# Patient Record
Sex: MALE | Race: WHITE | NOT HISPANIC OR LATINO | Employment: OTHER | URBAN - METROPOLITAN AREA
[De-identification: names, ages, dates, MRNs, and addresses within clinical notes are randomized per-mention and may not be internally consistent; named-entity substitution may affect disease eponyms.]

---

## 2024-05-20 ENCOUNTER — IN HOME VISIT (OUTPATIENT)
Dept: WOUND CARE | Facility: HOSPITAL | Age: 89
End: 2024-05-20
Payer: COMMERCIAL

## 2024-05-20 DIAGNOSIS — S81.811A SKIN TEAR OF RIGHT LOWER LEG WITHOUT COMPLICATION, INITIAL ENCOUNTER: ICD-10-CM

## 2024-05-20 DIAGNOSIS — S51.012A SKIN TEAR OF LEFT ELBOW WITHOUT COMPLICATION, INITIAL ENCOUNTER: ICD-10-CM

## 2024-05-20 DIAGNOSIS — S51.011A SKIN TEAR OF RIGHT ELBOW WITHOUT COMPLICATION, INITIAL ENCOUNTER: Primary | ICD-10-CM

## 2024-05-20 PROCEDURE — 99344 HOME/RES VST NEW MOD MDM 60: CPT | Performed by: NURSE PRACTITIONER

## 2024-05-24 PROBLEM — S51.012A SKIN TEAR OF LEFT ELBOW WITHOUT COMPLICATION: Status: ACTIVE | Noted: 2024-05-24

## 2024-05-24 PROBLEM — S51.011A SKIN TEAR OF RIGHT ELBOW WITHOUT COMPLICATION: Status: ACTIVE | Noted: 2024-05-24

## 2024-05-24 PROBLEM — S81.811A SKIN TEAR OF RIGHT LOWER LEG WITHOUT COMPLICATION: Status: ACTIVE | Noted: 2024-05-24

## 2024-05-24 NOTE — ASSESSMENT & PLAN NOTE
Right lower leg  Wound is full-thickness, with no obvious sign of infection  Local wound care with calcium alginate due to moderate drainage  Continue to offload  Follow-up in 2 weeks

## 2024-05-24 NOTE — ASSESSMENT & PLAN NOTE
Left elbow  Partial-thickness, with no obvious sign of infection  Local wound care with bordered foam  Continue to offload

## 2024-05-24 NOTE — ASSESSMENT & PLAN NOTE
Right elbow  Partial-thickness, with no obvious sign of infection  Local wound care with bordered foam  Continue to offload

## 2024-05-24 NOTE — PROGRESS NOTES
[facility] WOUND CARE MANAGEMENT   AND HYPERBARIC MEDICINE CENTER       Patient ID: Dmitriy Craven is a 89 y.o. male Date of Birth 3/12/1932     Location of Service: [facility]    Performed wound round with: Wound team     Chief Complaint : bilateral elbow and right lower leg    Wound Instructions:  Wound: Bilateral elbow  Discontinue previous wound order  Cleanse the wound bed with NSS   Apply non-sting skin prep to periwound area  Apply bordered foam to wound bed  Frequency : three times a week and prn for soiling    Wound: Right lower leg  Discontinue previous wound order  Cleanse the wound bed with normal saline solution  Apply Skin-Prep to periwound area  Apply calcium alginate to wound bed and cover with bordered foam  3 times a week and as needed for soiling    Offload all wounds  Turn and reposition frequently  Instruct / Assist with weight shifting in wheelchair  Increase protein intake.  Monitor for any sign of infection or worsening, inform PCP or patient's primary physician in your facility.      Allergies  Patient has no allergy information on record.      Assessment & Plan:  1. Skin tear of right elbow without complication, initial encounter  Assessment & Plan:  Right elbow  Partial-thickness, with no obvious sign of infection  Local wound care with bordered foam  Continue to offload  2. Skin tear of left elbow without complication, initial encounter  Assessment & Plan:  Left elbow  Partial-thickness, with no obvious sign of infection  Local wound care with bordered foam  Continue to offload  3. Skin tear of right lower leg without complication, initial encounter  Assessment & Plan:  Right lower leg  Wound is full-thickness, with no obvious sign of infection  Local wound care with calcium alginate due to moderate drainage  Continue to offload  Follow-up in 2 weeks           Subjective:   May 20, 2024.  New consult for wound on the bilateral elbow and right lower leg.  Etiology unknown.   Received patient in chair, seems comfortable.  Denies pain.  No significant issues reported related to the wound.        Review of Systems   Constitutional: Negative.    Respiratory: Negative.     Cardiovascular: Negative.    Skin:  Positive for wound.   Psychiatric/Behavioral: Negative.         Objective:    Physical Exam  Constitutional:       Appearance: Normal appearance.   Cardiovascular:      Comments: As per report, patient's blood pressure is low  Pulmonary:      Effort: Pulmonary effort is normal.   Musculoskeletal:      Right lower leg: Edema present.      Left lower leg: Edema present.      Comments: Mild edema on bilateral lower legs, wheelchair-bound   Skin:     Findings: Lesion present.      Comments: Right elbow: Wound size is 1.5 x 2 cm.,  Partial-thickness, small amount of serous drainage, periwound normal, with no obvious sign of infection    Left elbow: Wound size is 1 x 6 cm.,  Partial-thickness, small amount of serous drainage, periwound is normal, with no obvious sign of infection    Right lower leg posterior: Wound size is 2 x 1.5 x 0.1 cm.,  100% granulation, moderate amount of serous drainage, periwound is normal, with no obvious sign of infection   Neurological:      Mental Status: He is alert.              Procedures           Patient's care was coordinated with nursing facility staff. Recent vitals, labs and updated medications were reviewed on EMR or chart system of facility. Past Medical, surgical, social, medication and allergy history and patient's previous records were reviewed and updated as appropriate: Most up-to date information is available in the facility EMR where the patient is currently admitted.    Patient Active Problem List   Diagnosis    Skin tear of right elbow without complication    Skin tear of left elbow without complication    Skin tear of right lower leg without complication     No past medical history on file.  No past surgical history on file.  Social History  "    Socioeconomic History    Marital status:      Spouse name: Not on file    Number of children: Not on file    Years of education: Not on file    Highest education level: Not on file   Occupational History    Not on file   Tobacco Use    Smoking status: Not on file    Smokeless tobacco: Not on file   Substance and Sexual Activity    Alcohol use: Not on file    Drug use: Not on file    Sexual activity: Not on file   Other Topics Concern    Not on file   Social History Narrative    Not on file     Social Determinants of Health     Financial Resource Strain: Not on file   Food Insecurity: Not on file   Transportation Needs: Not on file   Physical Activity: Not on file   Stress: Not on file   Social Connections: Not on file   Intimate Partner Violence: Not on file   Housing Stability: Not on file      No current outpatient medications on file.  No family history on file.           Coordination of Care: Wound team aware of the treatment plan. Facility nurse will provide wound treatment and monitor the wound for any changes.     Patient / Staff education : Patient / Staff was given education on sign of infection and pressure ulcer prevention. Patient/ Staff verbalized understanding     Follow up :  2 weeks    Voice-recognition software may have been used in the preparation of this document. Occasional wrong word or \"sound-alike\" substitutions may have occurred due to the inherent limitations of voice recognition software. Interpretation should be guided by context.      HAILEY Thurman  "

## 2024-05-28 ENCOUNTER — TRANSCRIBE ORDERS (OUTPATIENT)
Dept: LAB | Facility: CLINIC | Age: 89
End: 2024-05-28

## 2024-05-28 ENCOUNTER — APPOINTMENT (OUTPATIENT)
Dept: LAB | Facility: CLINIC | Age: 89
End: 2024-05-28
Payer: COMMERCIAL

## 2024-05-28 DIAGNOSIS — I48.91 ATRIAL FIBRILLATION, UNSPECIFIED TYPE (HCC): Primary | ICD-10-CM

## 2024-05-28 DIAGNOSIS — I48.91 ATRIAL FIBRILLATION, UNSPECIFIED TYPE (HCC): ICD-10-CM

## 2024-05-28 LAB
INR PPP: 4.32 (ref 0.84–1.19)
PROTHROMBIN TIME: 40.4 SECONDS (ref 11.6–14.5)

## 2024-05-28 PROCEDURE — 36415 COLL VENOUS BLD VENIPUNCTURE: CPT

## 2024-05-28 PROCEDURE — 85610 PROTHROMBIN TIME: CPT

## 2024-06-03 ENCOUNTER — IN HOME VISIT (OUTPATIENT)
Dept: WOUND CARE | Facility: HOSPITAL | Age: 89
End: 2024-06-03
Payer: COMMERCIAL

## 2024-06-03 DIAGNOSIS — S81.811A SKIN TEAR OF RIGHT LOWER LEG WITHOUT COMPLICATION, INITIAL ENCOUNTER: ICD-10-CM

## 2024-06-03 DIAGNOSIS — S51.011A SKIN TEAR OF RIGHT ELBOW WITHOUT COMPLICATION, INITIAL ENCOUNTER: Primary | ICD-10-CM

## 2024-06-03 DIAGNOSIS — S51.012A SKIN TEAR OF LEFT ELBOW WITHOUT COMPLICATION, INITIAL ENCOUNTER: ICD-10-CM

## 2024-06-03 PROCEDURE — 99348 HOME/RES VST EST LOW MDM 30: CPT | Performed by: NURSE PRACTITIONER

## 2024-06-03 PROCEDURE — 97597 DBRDMT OPN WND 1ST 20 CM/<: CPT | Performed by: NURSE PRACTITIONER

## 2024-06-03 NOTE — ASSESSMENT & PLAN NOTE
Right lower leg  Wound is covered with slough, debridement performed, 100% granulation postdebridement  Continue local wound care with calcium alginate  Continue to offload  Patient under the care of visiting nurse for wound management  Follow-up in 2 weeks

## 2024-06-03 NOTE — PROGRESS NOTES
[facility] WOUND CARE MANAGEMENT   AND HYPERBARIC MEDICINE CENTER       Patient ID: Dmitriy Craven is a 89 y.o. male Date of Birth 3/12/1932     Location of Service: [facility], [place], operated by [facility]    Performed wound round with: Wound team     Chief Complaint : bilateral elbow and right lower leg    Wound Instructions:  Wound: Left elbow  Discontinue previous wound order  Cleanse the wound bed with NSS   Apply non-sting skin prep to periwound area  Apply bordered foam to wound bed  Frequency : three times a week and prn for soiling    Discontinue wound treatment on the right elbow    Wound: Right lower leg  Discontinue previous wound order  Cleanse the wound bed with normal saline solution  Apply Skin-Prep to periwound area  Apply calcium alginate to wound bed and cover with bordered foam  3 times a week and as needed for soiling    Offload all wounds  Turn and reposition frequently  Instruct / Assist with weight shifting in wheelchair  Increase protein intake.  Monitor for any sign of infection or worsening, inform PCP or patient's primary physician in your facility.      Allergies  Patient has no allergy information on record.      Assessment & Plan:  1. Skin tear of right elbow without complication, initial encounter  Assessment & Plan:  Right elbow healed  2. Skin tear of left elbow without complication, initial encounter  Assessment & Plan:  Wound improved, decreasing wound size  Continue bordered foam   follow-up in 2 weeks  3. Skin tear of right lower leg without complication, initial encounter  Assessment & Plan:  Right lower leg  Wound is covered with slough, debridement performed, 100% granulation postdebridement  Continue local wound care with calcium alginate  Continue to offload  Patient under the care of visiting nurse for wound management  Follow-up in 2 weeks  Orders:  -     Debridement             Subjective:   May 20, 2024.  New consult for wound on the bilateral elbow and right lower leg.  Etiology unknown.   "Received patient in chair, seems comfortable.  Denies pain.  No significant issues reported related to the wound.    6/3/2024 Follow up for wound on the bilateral elbow and right lower leg . Received patient, not in distress. Facility staff did not report any significant issues related to the wound.   Left elbow          Review of Systems   Constitutional: Negative.    Respiratory: Negative.     Cardiovascular: Negative.    Skin:  Positive for wound.   Psychiatric/Behavioral: Negative.         Objective:    Physical Exam  Constitutional:       Appearance: Normal appearance.   Cardiovascular:      Comments: As per report, patient's blood pressure is low  Pulmonary:      Effort: Pulmonary effort is normal.   Musculoskeletal:      Right lower leg: Edema present.      Left lower leg: Edema present.      Comments: Mild edema on bilateral lower legs, wheelchair-bound   Skin:     Findings: Lesion present.      Comments: Right elbow: wound is healed    Left elbow: Wound size is 0.5 x 1.2.,  Partial-thickness, small amount of serous drainage, periwound is normal, with no obvious sign of infection    Right lower leg posterior: Wound size is 1 x .9 x 0.4 cm.,  100% granulation, moderate amount of serous drainage, periwound is normal, with no obvious sign of infection   Neurological:      Mental Status: He is alert.              Debridement    Universal Protocol:  Consent: Verbal consent obtained.  Risks and benefits: risks, benefits and alternatives were discussed  Consent given by: patient  Time out: Immediately prior to procedure a \"time out\" was called to verify the correct patient, procedure, equipment, support staff and site/side marked as required.  Timeout called at: 6/3/2024 8:34 AM.  Patient understanding: patient states understanding of the procedure being performed  Patient identity confirmed: verbally with patient    Debridement Details  Performed by: NP (right Lower leg)  Debridement type: " selective      Post-debridement measurements  Length (cm): 1  Width (cm): 0.9  Depth (cm): 0.4  Percent debrided: 100%  Surface Area (cm^2): 0.9  Area Debrided (cm^2): 0.9  Volume (cm^3): 0.36    Devitalized tissue debrided: fibrin and slough  Instrument(s) utilized: curette  Technique utilized: nonexcisionalBleeding: small  Hemostasis obtained with: pressure  Procedural pain (0-10): 0  Post-procedural pain: 0   Response to treatment: procedure was tolerated well               Patient's care was coordinated with nursing facility staff. Recent vitals, labs and updated medications were reviewed on EMR or chart system of facility. Past Medical, surgical, social, medication and allergy history and patient's previous records were reviewed and updated as appropriate: Most up-to date information is available in the facility EMR where the patient is currently admitted.    Patient Active Problem List   Diagnosis    Skin tear of right elbow without complication    Skin tear of left elbow without complication    Skin tear of right lower leg without complication     History reviewed. No pertinent past medical history.  History reviewed. No pertinent surgical history.  Social History     Socioeconomic History    Marital status:      Spouse name: None    Number of children: None    Years of education: None    Highest education level: None   Occupational History    None   Tobacco Use    Smoking status: None    Smokeless tobacco: None   Substance and Sexual Activity    Alcohol use: None    Drug use: None    Sexual activity: None   Other Topics Concern    None   Social History Narrative    None     Social Determinants of Health     Financial Resource Strain: Not on file   Food Insecurity: Not on file   Transportation Needs: Not on file   Physical Activity: Not on file   Stress: Not on file   Social Connections: Not on file   Intimate Partner Violence: Not on file   Housing Stability: Not on file      No current outpatient  "medications on file.  History reviewed. No pertinent family history.           Coordination of Care: Wound team aware of the treatment plan. Facility nurse will provide wound treatment and monitor the wound for any changes.     Patient / Staff education : Patient / Staff was given education on sign of infection and pressure ulcer prevention. Patient/ Staff verbalized understanding     Follow up :  2 weeks    Voice-recognition software may have been used in the preparation of this document. Occasional wrong word or \"sound-alike\" substitutions may have occurred due to the inherent limitations of voice recognition software. Interpretation should be guided by context.      HAILEY Thurman  "

## 2024-06-05 ENCOUNTER — APPOINTMENT (OUTPATIENT)
Dept: LAB | Facility: CLINIC | Age: 89
End: 2024-06-05
Payer: COMMERCIAL

## 2024-06-05 DIAGNOSIS — I48.91 ATRIAL FIBRILLATION, UNSPECIFIED TYPE (HCC): ICD-10-CM

## 2024-06-05 LAB
INR PPP: 3.1 (ref 0.84–1.19)
PROTHROMBIN TIME: 31.3 SECONDS (ref 11.6–14.5)

## 2024-06-05 PROCEDURE — 85610 PROTHROMBIN TIME: CPT

## 2024-06-05 PROCEDURE — 36415 COLL VENOUS BLD VENIPUNCTURE: CPT

## 2024-06-10 ENCOUNTER — IN HOME VISIT (OUTPATIENT)
Dept: WOUND CARE | Facility: HOSPITAL | Age: 89
End: 2024-06-10
Payer: COMMERCIAL

## 2024-06-10 DIAGNOSIS — S81.811A SKIN TEAR OF RIGHT LOWER LEG WITHOUT COMPLICATION, INITIAL ENCOUNTER: ICD-10-CM

## 2024-06-10 DIAGNOSIS — S51.012A SKIN TEAR OF LEFT ELBOW WITHOUT COMPLICATION, INITIAL ENCOUNTER: ICD-10-CM

## 2024-06-10 DIAGNOSIS — S51.011A SKIN TEAR OF RIGHT ELBOW WITHOUT COMPLICATION, INITIAL ENCOUNTER: Primary | ICD-10-CM

## 2024-06-10 PROCEDURE — 97597 DBRDMT OPN WND 1ST 20 CM/<: CPT | Performed by: NURSE PRACTITIONER

## 2024-06-10 NOTE — ASSESSMENT & PLAN NOTE
Right lower leg  Wound improved, increase granulation, 100% granulation postdebridement  Continue local wound care with calcium alginate  Continue to offload  Patient under the care of visiting nurse for wound management  Follow-up in 2 weeks

## 2024-06-10 NOTE — PROGRESS NOTES
[facility] WOUND CARE MANAGEMENT   AND HYPERBARIC MEDICINE CENTER       Patient ID: Dmitriy Craven is a 89 y.o. male Date of Birth 3/12/1932     Location of Service: [facility]    Performed wound round with: Wound team     Chief Complaint : bilateral elbow and right lower leg    Wound Instructions:  Wound: Left elbow  Discontinue previous wound order  Discontinue wound treatment on the right elbow    Wound: Right lower leg  Discontinue previous wound order  Cleanse the wound bed with normal saline solution  Apply Skin-Prep to periwound area  Apply calcium alginate to wound bed and cover with bordered foam  daily and as needed for soiling    Offload all wounds  Turn and reposition frequently  Instruct / Assist with weight shifting in wheelchair  Increase protein intake.  Monitor for any sign of infection or worsening, inform PCP or patient's primary physician in your facility.      Allergies  Patient has no allergy information on record.      Assessment & Plan:  1. Skin tear of right elbow without complication, initial encounter  Assessment & Plan:  Wound is healed  2. Skin tear of left elbow without complication, initial encounter  Assessment & Plan:  Left elbow  Wound is healed  3. Skin tear of right lower leg without complication, initial encounter  Assessment & Plan:  Right lower leg  Wound improved, increase granulation, 100% granulation postdebridement  Continue local wound care with calcium alginate  Continue to offload  Patient under the care of visiting nurse for wound management  Follow-up in 2 weeks               Subjective:   May 20, 2024.  New consult for wound on the bilateral elbow and right lower leg.  Etiology unknown.  Received patient in chair, seems comfortable.  Denies pain.  No significant issues reported related to the wound.    6/3/2024 Follow up for wound on the bilateral elbow and right lower leg . Received patient, not in distress. Facility staff did not report any significant  "issues related to the wound.     6/10/2024 Follow up for wound on the bilateral elbow and right lower leg posterior . Received patient, not in distress. Facility staff did not report any significant issues related to the wound. Denies pain.            Review of Systems   Constitutional: Negative.    Respiratory: Negative.     Cardiovascular: Negative.    Skin:  Positive for wound.   Psychiatric/Behavioral: Negative.         Objective:    Physical Exam  Constitutional:       Appearance: Normal appearance.   Cardiovascular:      Comments: As per report, patient's blood pressure is low  Pulmonary:      Effort: Pulmonary effort is normal.   Musculoskeletal:      Right lower leg: Edema present.      Left lower leg: Edema present.      Comments: Mild edema on bilateral lower legs, wheelchair-bound   Skin:     Findings: Lesion present.      Comments: Right elbow: wound is healed    Left elbow: wound is healed    Right lower leg posterior: Wound size is 1 .x 1 x 0.3 cm.,  80% granulation, 20% slough,large amount of serous drainage, periwound is normal, with no obvious sign of infection   Neurological:      Mental Status: He is alert.              Debridement    Universal Protocol:  Consent: Verbal consent obtained.  Risks and benefits: risks, benefits and alternatives were discussed  Consent given by: patient  Time out: Immediately prior to procedure a \"time out\" was called to verify the correct patient, procedure, equipment, support staff and site/side marked as required.  Timeout called at: 6/10/2024 8:00 AM.  Patient understanding: patient states understanding of the procedure being performed  Patient identity confirmed: verbally with patient    Debridement Details  Performed by: NP (right lower leg)  Debridement type: selective      Post-debridement measurements  Length (cm): 1.2  Width (cm): 1  Depth (cm): 0.3  Percent debrided: 100%  Surface Area (cm^2): 1.2  Area Debrided (cm^2): 1.2  Volume (cm^3): " 0.36    Devitalized tissue debrided: biofilm, fibrin and slough  Instrument(s) utilized: curette  Technique utilized: nonexcisionalBleeding: small  Hemostasis obtained with: pressure  Procedural pain (0-10): 0  Post-procedural pain: 0   Response to treatment: procedure was tolerated well               Patient's care was coordinated with nursing facility staff. Recent vitals, labs and updated medications were reviewed on EMR or chart system of facility. Past Medical, surgical, social, medication and allergy history and patient's previous records were reviewed and updated as appropriate: Most up-to date information is available in the facility EMR where the patient is currently admitted.    Patient Active Problem List   Diagnosis    Skin tear of right elbow without complication    Skin tear of left elbow without complication    Skin tear of right lower leg without complication     History reviewed. No pertinent past medical history.  History reviewed. No pertinent surgical history.  Social History     Socioeconomic History    Marital status:      Spouse name: None    Number of children: None    Years of education: None    Highest education level: None   Occupational History    None   Tobacco Use    Smoking status: None    Smokeless tobacco: None   Substance and Sexual Activity    Alcohol use: None    Drug use: None    Sexual activity: None   Other Topics Concern    None   Social History Narrative    None     Social Determinants of Health     Financial Resource Strain: Not on file   Food Insecurity: Not on file   Transportation Needs: Not on file   Physical Activity: Not on file   Stress: Not on file   Social Connections: Not on file   Intimate Partner Violence: Not on file   Housing Stability: Not on file      No current outpatient medications on file.  History reviewed. No pertinent family history.           Coordination of Care: Wound team aware of the treatment plan. Facility nurse will provide wound  "treatment and monitor the wound for any changes.     Patient / Staff education : Patient / Staff was given education on sign of infection and pressure ulcer prevention. Patient/ Staff verbalized understanding     Follow up :  2 weeks    Voice-recognition software may have been used in the preparation of this document. Occasional wrong word or \"sound-alike\" substitutions may have occurred due to the inherent limitations of voice recognition software. Interpretation should be guided by context.      HAILEY Thurman  "

## 2024-06-12 ENCOUNTER — TRANSCRIBE ORDERS (OUTPATIENT)
Dept: LAB | Facility: CLINIC | Age: 89
End: 2024-06-12

## 2024-06-12 ENCOUNTER — APPOINTMENT (OUTPATIENT)
Dept: LAB | Facility: CLINIC | Age: 89
End: 2024-06-12
Payer: COMMERCIAL

## 2024-06-12 DIAGNOSIS — N18.31 CHRONIC KIDNEY DISEASE (CKD) STAGE G3A/A1, MODERATELY DECREASED GLOMERULAR FILTRATION RATE (GFR) BETWEEN 45-59 ML/MIN/1.73 SQUARE METER AND ALBUMINURIA CREATININE RATIO LESS THAN 30 MG/G (HCC): ICD-10-CM

## 2024-06-12 DIAGNOSIS — Z95.2 HEART VALVE REPLACED BY TRANSPLANT: ICD-10-CM

## 2024-06-12 DIAGNOSIS — N18.31 CHRONIC KIDNEY DISEASE (CKD) STAGE G3A/A1, MODERATELY DECREASED GLOMERULAR FILTRATION RATE (GFR) BETWEEN 45-59 ML/MIN/1.73 SQUARE METER AND ALBUMINURIA CREATININE RATIO LESS THAN 30 MG/G (HCC): Primary | ICD-10-CM

## 2024-06-12 DIAGNOSIS — I48.91 ATRIAL FIBRILLATION, UNSPECIFIED TYPE (HCC): ICD-10-CM

## 2024-06-12 LAB
INR PPP: 2.51 (ref 0.84–1.19)
PROTHROMBIN TIME: 26.6 SECONDS (ref 11.6–14.5)

## 2024-06-12 PROCEDURE — 36415 COLL VENOUS BLD VENIPUNCTURE: CPT

## 2024-06-12 PROCEDURE — 85610 PROTHROMBIN TIME: CPT

## 2024-06-19 ENCOUNTER — APPOINTMENT (OUTPATIENT)
Dept: LAB | Facility: CLINIC | Age: 89
End: 2024-06-19
Payer: COMMERCIAL

## 2024-06-19 DIAGNOSIS — N18.31 CHRONIC KIDNEY DISEASE (CKD) STAGE G3A/A1, MODERATELY DECREASED GLOMERULAR FILTRATION RATE (GFR) BETWEEN 45-59 ML/MIN/1.73 SQUARE METER AND ALBUMINURIA CREATININE RATIO LESS THAN 30 MG/G (HCC): ICD-10-CM

## 2024-06-19 DIAGNOSIS — I48.91 ATRIAL FIBRILLATION, UNSPECIFIED TYPE (HCC): ICD-10-CM

## 2024-06-19 DIAGNOSIS — Z95.2 HEART VALVE REPLACED BY TRANSPLANT: ICD-10-CM

## 2024-06-19 LAB
INR PPP: 1.92 (ref 0.84–1.19)
PROTHROMBIN TIME: 21.6 SECONDS (ref 11.6–14.5)

## 2024-06-19 PROCEDURE — 85610 PROTHROMBIN TIME: CPT

## 2024-06-19 PROCEDURE — 36415 COLL VENOUS BLD VENIPUNCTURE: CPT

## 2024-07-01 ENCOUNTER — IN HOME VISIT (OUTPATIENT)
Dept: WOUND CARE | Facility: HOSPITAL | Age: 89
End: 2024-07-01
Payer: COMMERCIAL

## 2024-07-01 DIAGNOSIS — S81.811A SKIN TEAR OF RIGHT LOWER LEG WITHOUT COMPLICATION, INITIAL ENCOUNTER: Primary | ICD-10-CM

## 2024-07-01 PROCEDURE — 99347 HOME/RES VST EST SF MDM 20: CPT | Performed by: NURSE PRACTITIONER

## 2024-07-01 NOTE — ASSESSMENT & PLAN NOTE
Right lower leg  Wound is healed  Moisturizing lotion to BLE  Continue compression  Education provided to patient on compression and elevation  Sign off. Facility staff will continue to provide treatment and monitor the wound. Can reconsult wound nurse practitioner if wound failed to heal or worsened.

## 2024-07-01 NOTE — PROGRESS NOTES
[facility] WOUND CARE MANAGEMENT   AND HYPERBARIC MEDICINE CENTER       Patient ID: Dmitriy Craven is a 89 y.o. male Date of Birth 3/12/1932     Location of Service: [facility]    Performed wound round with: Wound team     Chief Complaint : right lower leg    Wound Instructions:  Wound: Right lower leg  Discontinue previous wound order  Moisturizing lotion to BLE daily  Compression : Tubigrip on BLE from the base of the toes to proximal calf, on in am, off at night  Elevate BLE atleast 4 times a day for 20 minutes    Offload all wounds  Turn and reposition frequently  Instruct / Assist with weight shifting in wheelchair  Increase protein intake.  Monitor for any sign of infection or worsening, inform PCP or patient's primary physician in your facility.      Allergies  Patient has no allergy information on record.      Assessment & Plan:  1. Skin tear of right lower leg without complication, initial encounter  Assessment & Plan:  Right lower leg  Wound is healed  Moisturizing lotion to BLE  Continue compression  Education provided to patient on compression and elevation  Sign off. Facility staff will continue to provide treatment and monitor the wound. Can reconsult wound nurse practitioner if wound failed to heal or worsened.                    Subjective:   May 20, 2024.  New consult for wound on the bilateral elbow and right lower leg.  Etiology unknown.  Received patient in chair, seems comfortable.  Denies pain.  No significant issues reported related to the wound.    6/3/2024 Follow up for wound on the bilateral elbow and right lower leg . Received patient, not in distress. Facility staff did not report any significant issues related to the wound.     6/10/2024 Follow up for wound on the bilateral elbow and right lower leg posterior . Received patient, not in distress. Facility staff did not report any significant issues related to the wound. Denies pain.    7/1/2024 Follow up for wound on the right  lower leg . Received patient, not in distress. Facility staff did not report any significant issues related to the wound. Denies pain.               Review of Systems   Constitutional: Negative.    Respiratory: Negative.     Cardiovascular: Negative.    Skin:  Positive for wound.   Psychiatric/Behavioral: Negative.         Objective:    Physical Exam  Constitutional:       Appearance: Normal appearance.   Cardiovascular:      Comments: As per report, patient's blood pressure is low  Pulmonary:      Effort: Pulmonary effort is normal.   Musculoskeletal:      Right lower leg: Edema present.      Left lower leg: Edema present.      Comments: Mild edema on bilateral lower legs, wheelchair-bound   Skin:     Findings: Lesion present.      Comments: Right lower leg posterior: Wound is healed   Neurological:      Mental Status: He is alert.              Procedures           Patient's care was coordinated with nursing facility staff. Recent vitals, labs and updated medications were reviewed on EMR or chart system of facility. Past Medical, surgical, social, medication and allergy history and patient's previous records were reviewed and updated as appropriate: Most up-to date information is available in the facility EMR where the patient is currently admitted.    Patient Active Problem List   Diagnosis    Skin tear of right elbow without complication    Skin tear of left elbow without complication    Skin tear of right lower leg without complication     History reviewed. No pertinent past medical history.  History reviewed. No pertinent surgical history.  Social History     Socioeconomic History    Marital status:      Spouse name: None    Number of children: None    Years of education: None    Highest education level: None   Occupational History    None   Tobacco Use    Smoking status: None    Smokeless tobacco: None   Substance and Sexual Activity    Alcohol use: None    Drug use: None    Sexual activity: None  "  Other Topics Concern    None   Social History Narrative    None     Social Determinants of Health     Financial Resource Strain: Not on file   Food Insecurity: Not on file   Transportation Needs: Not on file   Physical Activity: Not on file   Stress: Not on file   Social Connections: Not on file   Intimate Partner Violence: Not on file   Housing Stability: Not on file      No current outpatient medications on file.  History reviewed. No pertinent family history.           Coordination of Care: Wound team aware of the treatment plan. Facility nurse will provide wound treatment and monitor the wound for any changes.     Patient / Staff education : Patient / Staff was given education on sign of infection and pressure ulcer prevention. Patient/ Staff verbalized understanding     Follow up :  Sign off. Facility staff will continue to provide treatment and monitor the wound. Can reconsult wound nurse practitioner if wound failed to heal or worsened.       Voice-recognition software may have been used in the preparation of this document. Occasional wrong word or \"sound-alike\" substitutions may have occurred due to the inherent limitations of voice recognition software. Interpretation should be guided by context.      HAILEY Thurman  "

## 2024-07-03 ENCOUNTER — TRANSCRIBE ORDERS (OUTPATIENT)
Dept: LAB | Facility: CLINIC | Age: 89
End: 2024-07-03

## 2024-07-03 ENCOUNTER — APPOINTMENT (OUTPATIENT)
Dept: LAB | Facility: CLINIC | Age: 89
End: 2024-07-03
Payer: COMMERCIAL

## 2024-07-03 DIAGNOSIS — N18.31 CHRONIC KIDNEY DISEASE (CKD) STAGE G3A/A1, MODERATELY DECREASED GLOMERULAR FILTRATION RATE (GFR) BETWEEN 45-59 ML/MIN/1.73 SQUARE METER AND ALBUMINURIA CREATININE RATIO LESS THAN 30 MG/G (HCC): ICD-10-CM

## 2024-07-03 DIAGNOSIS — I48.91 ATRIAL FIBRILLATION, UNSPECIFIED TYPE (HCC): Primary | ICD-10-CM

## 2024-07-03 DIAGNOSIS — Z95.2 HEART VALVE REPLACED BY TRANSPLANT: ICD-10-CM

## 2024-07-03 DIAGNOSIS — I48.91 ATRIAL FIBRILLATION, UNSPECIFIED TYPE (HCC): ICD-10-CM

## 2024-07-03 LAB
INR PPP: 2 (ref 0.84–1.19)
PROTHROMBIN TIME: 22.4 SECONDS (ref 11.6–14.5)

## 2024-07-03 PROCEDURE — 85610 PROTHROMBIN TIME: CPT

## 2024-07-03 PROCEDURE — 36415 COLL VENOUS BLD VENIPUNCTURE: CPT

## 2024-07-19 ENCOUNTER — TRANSCRIBE ORDERS (OUTPATIENT)
Dept: LAB | Facility: CLINIC | Age: 89
End: 2024-07-19

## 2024-07-19 ENCOUNTER — APPOINTMENT (OUTPATIENT)
Dept: LAB | Facility: CLINIC | Age: 89
End: 2024-07-19
Payer: COMMERCIAL

## 2024-07-19 DIAGNOSIS — I48.91 ATRIAL FIBRILLATION, UNSPECIFIED TYPE (HCC): Primary | ICD-10-CM

## 2024-07-19 DIAGNOSIS — I48.91 ATRIAL FIBRILLATION, UNSPECIFIED TYPE (HCC): ICD-10-CM

## 2024-07-19 LAB
INR PPP: 1.91 (ref 0.84–1.19)
PROTHROMBIN TIME: 21.6 SECONDS (ref 11.6–14.5)

## 2024-07-19 PROCEDURE — 36415 COLL VENOUS BLD VENIPUNCTURE: CPT

## 2024-07-19 PROCEDURE — 85610 PROTHROMBIN TIME: CPT

## 2024-08-05 ENCOUNTER — TRANSCRIBE ORDERS (OUTPATIENT)
Dept: ADMINISTRATIVE | Facility: HOSPITAL | Age: 89
End: 2024-08-05

## 2024-08-05 ENCOUNTER — APPOINTMENT (OUTPATIENT)
Dept: LAB | Facility: CLINIC | Age: 89
End: 2024-08-05
Payer: COMMERCIAL

## 2024-08-05 DIAGNOSIS — I48.91 ATRIAL FIBRILLATION, UNSPECIFIED TYPE (HCC): Primary | ICD-10-CM

## 2024-08-05 DIAGNOSIS — I48.91 ATRIAL FIBRILLATION, UNSPECIFIED TYPE (HCC): ICD-10-CM

## 2024-08-05 LAB
INR PPP: 1.95 (ref 0.85–1.19)
PROTHROMBIN TIME: 22.3 SECONDS (ref 12.3–15)

## 2024-08-05 PROCEDURE — 36415 COLL VENOUS BLD VENIPUNCTURE: CPT

## 2024-08-05 PROCEDURE — 85610 PROTHROMBIN TIME: CPT

## 2024-08-19 ENCOUNTER — APPOINTMENT (OUTPATIENT)
Dept: LAB | Facility: CLINIC | Age: 89
End: 2024-08-19
Payer: COMMERCIAL

## 2024-08-19 ENCOUNTER — TRANSCRIBE ORDERS (OUTPATIENT)
Dept: LAB | Facility: CLINIC | Age: 89
End: 2024-08-19

## 2024-08-19 DIAGNOSIS — I48.91 ATRIAL FIBRILLATION, UNSPECIFIED TYPE (HCC): ICD-10-CM

## 2024-08-19 DIAGNOSIS — I48.91 ATRIAL FIBRILLATION, UNSPECIFIED TYPE (HCC): Primary | ICD-10-CM

## 2024-08-19 LAB
INR PPP: 1.75 (ref 0.85–1.19)
PROTHROMBIN TIME: 20.6 SECONDS (ref 12.3–15)

## 2024-08-19 PROCEDURE — 85610 PROTHROMBIN TIME: CPT

## 2024-08-19 PROCEDURE — 36415 COLL VENOUS BLD VENIPUNCTURE: CPT

## 2024-08-26 ENCOUNTER — APPOINTMENT (OUTPATIENT)
Dept: LAB | Facility: CLINIC | Age: 89
End: 2024-08-26
Payer: COMMERCIAL

## 2024-08-26 ENCOUNTER — TRANSCRIBE ORDERS (OUTPATIENT)
Dept: LAB | Facility: CLINIC | Age: 89
End: 2024-08-26

## 2024-08-26 DIAGNOSIS — I48.91 ATRIAL FIBRILLATION, UNSPECIFIED TYPE (HCC): ICD-10-CM

## 2024-08-26 DIAGNOSIS — I48.91 ATRIAL FIBRILLATION, UNSPECIFIED TYPE (HCC): Primary | ICD-10-CM

## 2024-08-26 LAB
INR PPP: 1.84 (ref 0.85–1.19)
PROTHROMBIN TIME: 21.3 SECONDS (ref 12.3–15)

## 2024-08-26 PROCEDURE — 85610 PROTHROMBIN TIME: CPT

## 2024-08-26 PROCEDURE — 36415 COLL VENOUS BLD VENIPUNCTURE: CPT

## 2024-09-11 ENCOUNTER — APPOINTMENT (OUTPATIENT)
Dept: LAB | Facility: CLINIC | Age: 89
End: 2024-09-11
Payer: COMMERCIAL

## 2024-09-11 DIAGNOSIS — I48.91 ATRIAL FIBRILLATION, UNSPECIFIED TYPE (HCC): ICD-10-CM

## 2024-09-11 LAB
INR PPP: 2.15 (ref 0.85–1.19)
PROTHROMBIN TIME: 24 SECONDS (ref 12.3–15)

## 2024-09-11 PROCEDURE — 36415 COLL VENOUS BLD VENIPUNCTURE: CPT

## 2024-09-11 PROCEDURE — 85610 PROTHROMBIN TIME: CPT

## 2024-09-25 ENCOUNTER — TRANSCRIBE ORDERS (OUTPATIENT)
Dept: LAB | Facility: CLINIC | Age: 89
End: 2024-09-25

## 2024-09-25 ENCOUNTER — APPOINTMENT (OUTPATIENT)
Dept: LAB | Facility: CLINIC | Age: 89
End: 2024-09-25
Payer: COMMERCIAL

## 2024-09-25 DIAGNOSIS — I48.91 ATRIAL FIBRILLATION, UNSPECIFIED TYPE (HCC): Primary | ICD-10-CM

## 2024-09-25 DIAGNOSIS — I48.91 ATRIAL FIBRILLATION, UNSPECIFIED TYPE (HCC): ICD-10-CM

## 2024-09-25 LAB
INR PPP: 2.12 (ref 0.85–1.19)
PROTHROMBIN TIME: 23.7 SECONDS (ref 12.3–15)

## 2024-09-25 PROCEDURE — 85610 PROTHROMBIN TIME: CPT

## 2024-09-25 PROCEDURE — 36415 COLL VENOUS BLD VENIPUNCTURE: CPT

## 2024-10-09 ENCOUNTER — TRANSCRIBE ORDERS (OUTPATIENT)
Dept: LAB | Facility: CLINIC | Age: 89
End: 2024-10-09

## 2024-10-09 ENCOUNTER — APPOINTMENT (OUTPATIENT)
Dept: LAB | Facility: CLINIC | Age: 89
End: 2024-10-09
Payer: COMMERCIAL

## 2024-10-09 DIAGNOSIS — I48.91 ATRIAL FIBRILLATION, UNSPECIFIED TYPE (HCC): Primary | ICD-10-CM

## 2024-10-09 DIAGNOSIS — I48.91 ATRIAL FIBRILLATION, UNSPECIFIED TYPE (HCC): ICD-10-CM

## 2024-10-09 LAB
INR PPP: 2.78 (ref 0.85–1.19)
PROTHROMBIN TIME: 29.1 SECONDS (ref 12.3–15)

## 2024-10-09 PROCEDURE — 85610 PROTHROMBIN TIME: CPT

## 2024-10-09 PROCEDURE — 36415 COLL VENOUS BLD VENIPUNCTURE: CPT

## 2024-11-06 ENCOUNTER — TRANSCRIBE ORDERS (OUTPATIENT)
Dept: LAB | Facility: CLINIC | Age: 89
End: 2024-11-06

## 2024-11-06 ENCOUNTER — APPOINTMENT (OUTPATIENT)
Dept: LAB | Facility: CLINIC | Age: 89
End: 2024-11-06
Payer: COMMERCIAL

## 2024-11-06 DIAGNOSIS — I48.91 ATRIAL FIBRILLATION, UNSPECIFIED TYPE (HCC): Primary | ICD-10-CM

## 2024-11-06 DIAGNOSIS — I48.91 ATRIAL FIBRILLATION, UNSPECIFIED TYPE (HCC): ICD-10-CM

## 2024-11-06 LAB
INR PPP: 2.22 (ref 0.85–1.19)
PROTHROMBIN TIME: 24.6 SECONDS (ref 12.3–15)

## 2024-11-06 PROCEDURE — 36415 COLL VENOUS BLD VENIPUNCTURE: CPT

## 2024-11-06 PROCEDURE — 85610 PROTHROMBIN TIME: CPT

## 2024-11-20 ENCOUNTER — TRANSCRIBE ORDERS (OUTPATIENT)
Dept: LAB | Facility: CLINIC | Age: 89
End: 2024-11-20

## 2024-11-20 ENCOUNTER — APPOINTMENT (OUTPATIENT)
Dept: LAB | Facility: CLINIC | Age: 89
End: 2024-11-20
Payer: COMMERCIAL

## 2024-11-20 DIAGNOSIS — I48.91 ATRIAL FIBRILLATION, UNSPECIFIED TYPE (HCC): Primary | ICD-10-CM

## 2024-11-20 DIAGNOSIS — I48.91 ATRIAL FIBRILLATION, UNSPECIFIED TYPE (HCC): ICD-10-CM

## 2024-11-20 LAB
INR PPP: 2.83 (ref 0.85–1.19)
PROTHROMBIN TIME: 29.5 SECONDS (ref 12.3–15)

## 2024-11-20 PROCEDURE — 85610 PROTHROMBIN TIME: CPT

## 2024-11-20 PROCEDURE — 36415 COLL VENOUS BLD VENIPUNCTURE: CPT

## 2024-12-04 ENCOUNTER — TRANSCRIBE ORDERS (OUTPATIENT)
Dept: LAB | Facility: CLINIC | Age: 89
End: 2024-12-04

## 2024-12-04 ENCOUNTER — APPOINTMENT (OUTPATIENT)
Dept: LAB | Facility: CLINIC | Age: 89
End: 2024-12-04
Payer: COMMERCIAL

## 2024-12-04 DIAGNOSIS — I48.91 ATRIAL FIBRILLATION, UNSPECIFIED TYPE (HCC): ICD-10-CM

## 2024-12-04 DIAGNOSIS — I48.91 ATRIAL FIBRILLATION, UNSPECIFIED TYPE (HCC): Primary | ICD-10-CM

## 2024-12-04 LAB
INR PPP: 4.04 (ref 0.85–1.19)
PROTHROMBIN TIME: 38.6 SECONDS (ref 12.3–15)

## 2024-12-04 PROCEDURE — 85610 PROTHROMBIN TIME: CPT

## 2024-12-04 PROCEDURE — 36415 COLL VENOUS BLD VENIPUNCTURE: CPT

## 2024-12-12 ENCOUNTER — APPOINTMENT (OUTPATIENT)
Dept: LAB | Facility: CLINIC | Age: 89
End: 2024-12-12
Payer: COMMERCIAL

## 2024-12-12 ENCOUNTER — TRANSCRIBE ORDERS (OUTPATIENT)
Dept: LAB | Facility: CLINIC | Age: 89
End: 2024-12-12

## 2024-12-12 DIAGNOSIS — I48.91 ATRIAL FIBRILLATION, UNSPECIFIED TYPE (HCC): ICD-10-CM

## 2024-12-12 DIAGNOSIS — I48.91 ATRIAL FIBRILLATION, UNSPECIFIED TYPE (HCC): Primary | ICD-10-CM

## 2024-12-12 LAB
INR PPP: 3.55 (ref 0.85–1.19)
PROTHROMBIN TIME: 35 SECONDS (ref 12.3–15)

## 2024-12-12 PROCEDURE — 85610 PROTHROMBIN TIME: CPT

## 2024-12-12 PROCEDURE — 36415 COLL VENOUS BLD VENIPUNCTURE: CPT

## 2024-12-20 ENCOUNTER — TRANSCRIBE ORDERS (OUTPATIENT)
Dept: LAB | Facility: CLINIC | Age: 89
End: 2024-12-20

## 2024-12-20 ENCOUNTER — APPOINTMENT (OUTPATIENT)
Dept: LAB | Facility: CLINIC | Age: 89
End: 2024-12-20
Payer: COMMERCIAL

## 2024-12-20 DIAGNOSIS — I48.91 ATRIAL FIBRILLATION, UNSPECIFIED TYPE (HCC): Primary | ICD-10-CM

## 2024-12-20 DIAGNOSIS — I48.91 ATRIAL FIBRILLATION, UNSPECIFIED TYPE (HCC): ICD-10-CM

## 2024-12-20 LAB
INR PPP: 3.16 (ref 0.85–1.19)
PROTHROMBIN TIME: 32 SECONDS (ref 12.3–15)

## 2024-12-20 PROCEDURE — 85610 PROTHROMBIN TIME: CPT

## 2024-12-20 PROCEDURE — 36415 COLL VENOUS BLD VENIPUNCTURE: CPT

## 2024-12-27 ENCOUNTER — APPOINTMENT (OUTPATIENT)
Dept: LAB | Facility: CLINIC | Age: 89
End: 2024-12-27
Payer: COMMERCIAL

## 2024-12-27 ENCOUNTER — TRANSCRIBE ORDERS (OUTPATIENT)
Dept: ADMINISTRATIVE | Facility: HOSPITAL | Age: 89
End: 2024-12-27

## 2024-12-27 DIAGNOSIS — I48.91 ATRIAL FIBRILLATION, UNSPECIFIED TYPE (HCC): ICD-10-CM

## 2024-12-27 DIAGNOSIS — I10 ESSENTIAL HYPERTENSION, MALIGNANT: ICD-10-CM

## 2024-12-27 DIAGNOSIS — Z13.220 SCREENING FOR LIPOID DISORDERS: ICD-10-CM

## 2024-12-27 DIAGNOSIS — I48.91 ATRIAL FIBRILLATION, UNSPECIFIED TYPE (HCC): Primary | ICD-10-CM

## 2024-12-27 LAB
INR PPP: 2.43 (ref 0.85–1.19)
PROTHROMBIN TIME: 27.1 SECONDS (ref 12.3–15)

## 2024-12-27 PROCEDURE — 36415 COLL VENOUS BLD VENIPUNCTURE: CPT

## 2024-12-27 PROCEDURE — 85610 PROTHROMBIN TIME: CPT

## 2025-01-08 ENCOUNTER — APPOINTMENT (OUTPATIENT)
Dept: LAB | Facility: CLINIC | Age: OVER 89
End: 2025-01-08
Payer: COMMERCIAL

## 2025-01-08 ENCOUNTER — TRANSCRIBE ORDERS (OUTPATIENT)
Dept: LAB | Facility: CLINIC | Age: OVER 89
End: 2025-01-08

## 2025-01-08 DIAGNOSIS — I48.91 ATRIAL FIBRILLATION, UNSPECIFIED TYPE (HCC): Primary | ICD-10-CM

## 2025-01-08 DIAGNOSIS — I10 ESSENTIAL HYPERTENSION, MALIGNANT: ICD-10-CM

## 2025-01-08 DIAGNOSIS — Z13.220 SCREENING FOR LIPOID DISORDERS: ICD-10-CM

## 2025-01-08 DIAGNOSIS — I48.91 ATRIAL FIBRILLATION, UNSPECIFIED TYPE (HCC): ICD-10-CM

## 2025-01-08 LAB
ALBUMIN SERPL BCG-MCNC: 3.5 G/DL (ref 3.5–5)
ALP SERPL-CCNC: 84 U/L (ref 34–104)
ALT SERPL W P-5'-P-CCNC: 36 U/L (ref 7–52)
ANION GAP SERPL CALCULATED.3IONS-SCNC: 10 MMOL/L (ref 4–13)
AST SERPL W P-5'-P-CCNC: 32 U/L (ref 13–39)
BASOPHILS # BLD AUTO: 0.05 THOUSANDS/ΜL (ref 0–0.1)
BASOPHILS NFR BLD AUTO: 1 % (ref 0–1)
BILIRUB SERPL-MCNC: 0.6 MG/DL (ref 0.2–1)
BUN SERPL-MCNC: 75 MG/DL (ref 5–25)
CALCIUM SERPL-MCNC: 9.1 MG/DL (ref 8.4–10.2)
CHLORIDE SERPL-SCNC: 103 MMOL/L (ref 96–108)
CHOLEST SERPL-MCNC: 118 MG/DL (ref ?–200)
CO2 SERPL-SCNC: 28 MMOL/L (ref 21–32)
CREAT SERPL-MCNC: 2.17 MG/DL (ref 0.6–1.3)
EOSINOPHIL # BLD AUTO: 0.53 THOUSAND/ΜL (ref 0–0.61)
EOSINOPHIL NFR BLD AUTO: 8 % (ref 0–6)
ERYTHROCYTE [DISTWIDTH] IN BLOOD BY AUTOMATED COUNT: 17.2 % (ref 11.6–15.1)
GFR SERPL CREATININE-BSD FRML MDRD: 25 ML/MIN/1.73SQ M
GLUCOSE P FAST SERPL-MCNC: 55 MG/DL (ref 65–99)
HCT VFR BLD AUTO: 22.4 % (ref 36.5–49.3)
HDLC SERPL-MCNC: 48 MG/DL
HGB BLD-MCNC: 7 G/DL (ref 12–17)
IMM GRANULOCYTES # BLD AUTO: 0.02 THOUSAND/UL (ref 0–0.2)
IMM GRANULOCYTES NFR BLD AUTO: 0 % (ref 0–2)
INR PPP: 2.32 (ref 0.85–1.19)
LDLC SERPL CALC-MCNC: 55 MG/DL (ref 0–100)
LYMPHOCYTES # BLD AUTO: 0.96 THOUSANDS/ΜL (ref 0.6–4.47)
LYMPHOCYTES NFR BLD AUTO: 15 % (ref 14–44)
MCH RBC QN AUTO: 35 PG (ref 26.8–34.3)
MCHC RBC AUTO-ENTMCNC: 31.3 G/DL (ref 31.4–37.4)
MCV RBC AUTO: 112 FL (ref 82–98)
MONOCYTES # BLD AUTO: 1.37 THOUSAND/ΜL (ref 0.17–1.22)
MONOCYTES NFR BLD AUTO: 22 % (ref 4–12)
NEUTROPHILS # BLD AUTO: 3.36 THOUSANDS/ΜL (ref 1.85–7.62)
NEUTS SEG NFR BLD AUTO: 54 % (ref 43–75)
NONHDLC SERPL-MCNC: 70 MG/DL
NRBC BLD AUTO-RTO: 0 /100 WBCS
PLATELET # BLD AUTO: 205 THOUSANDS/UL (ref 149–390)
PMV BLD AUTO: 11.5 FL (ref 8.9–12.7)
POTASSIUM SERPL-SCNC: 4.1 MMOL/L (ref 3.5–5.3)
PROT SERPL-MCNC: 6.5 G/DL (ref 6.4–8.4)
PROTHROMBIN TIME: 25.4 SECONDS (ref 12.3–15)
RBC # BLD AUTO: 2 MILLION/UL (ref 3.88–5.62)
SODIUM SERPL-SCNC: 141 MMOL/L (ref 135–147)
TRIGL SERPL-MCNC: 74 MG/DL (ref ?–150)
TSH SERPL DL<=0.05 MIU/L-ACNC: 3.18 UIU/ML (ref 0.45–4.5)
WBC # BLD AUTO: 6.29 THOUSAND/UL (ref 4.31–10.16)

## 2025-01-08 PROCEDURE — 80061 LIPID PANEL: CPT

## 2025-01-08 PROCEDURE — 80053 COMPREHEN METABOLIC PANEL: CPT

## 2025-01-08 PROCEDURE — 83036 HEMOGLOBIN GLYCOSYLATED A1C: CPT

## 2025-01-08 PROCEDURE — 36415 COLL VENOUS BLD VENIPUNCTURE: CPT

## 2025-01-08 PROCEDURE — 85025 COMPLETE CBC W/AUTO DIFF WBC: CPT

## 2025-01-08 PROCEDURE — 85610 PROTHROMBIN TIME: CPT

## 2025-01-08 PROCEDURE — 84443 ASSAY THYROID STIM HORMONE: CPT

## 2025-01-09 LAB
EST. AVERAGE GLUCOSE BLD GHB EST-MCNC: 134 MG/DL
HBA1C MFR BLD: 6.3 %

## 2025-01-22 ENCOUNTER — APPOINTMENT (OUTPATIENT)
Dept: LAB | Facility: CLINIC | Age: OVER 89
End: 2025-01-22
Payer: COMMERCIAL

## 2025-01-22 DIAGNOSIS — I48.91 ATRIAL FIBRILLATION, UNSPECIFIED TYPE (HCC): ICD-10-CM

## 2025-01-22 LAB
INR PPP: 1.89 (ref 0.85–1.19)
PROTHROMBIN TIME: 21.8 SECONDS (ref 12.3–15)

## 2025-01-22 PROCEDURE — 36415 COLL VENOUS BLD VENIPUNCTURE: CPT

## 2025-01-22 PROCEDURE — 85610 PROTHROMBIN TIME: CPT

## 2025-02-04 ENCOUNTER — TRANSCRIBE ORDERS (OUTPATIENT)
Dept: LAB | Facility: CLINIC | Age: OVER 89
End: 2025-02-04

## 2025-02-04 ENCOUNTER — APPOINTMENT (OUTPATIENT)
Dept: LAB | Facility: CLINIC | Age: OVER 89
End: 2025-02-04
Payer: COMMERCIAL

## 2025-02-04 DIAGNOSIS — I48.91 ATRIAL FIBRILLATION, UNSPECIFIED TYPE (HCC): ICD-10-CM

## 2025-02-04 DIAGNOSIS — I48.91 ATRIAL FIBRILLATION, UNSPECIFIED TYPE (HCC): Primary | ICD-10-CM

## 2025-02-04 LAB
INR PPP: 2.15 (ref 0.85–1.19)
PROTHROMBIN TIME: 24 SECONDS (ref 12.3–15)

## 2025-02-04 PROCEDURE — 85610 PROTHROMBIN TIME: CPT

## 2025-02-04 PROCEDURE — 36415 COLL VENOUS BLD VENIPUNCTURE: CPT

## 2025-02-19 ENCOUNTER — APPOINTMENT (OUTPATIENT)
Dept: LAB | Facility: CLINIC | Age: OVER 89
End: 2025-02-19
Payer: COMMERCIAL

## 2025-02-19 ENCOUNTER — TRANSCRIBE ORDERS (OUTPATIENT)
Dept: LAB | Facility: CLINIC | Age: OVER 89
End: 2025-02-19

## 2025-02-19 DIAGNOSIS — I48.91 ATRIAL FIBRILLATION, UNSPECIFIED TYPE (HCC): ICD-10-CM

## 2025-02-19 DIAGNOSIS — I48.91 ATRIAL FIBRILLATION, UNSPECIFIED TYPE (HCC): Primary | ICD-10-CM

## 2025-02-19 LAB
INR PPP: 2.79 (ref 0.85–1.19)
PROTHROMBIN TIME: 29.2 SECONDS (ref 12.3–15)

## 2025-02-19 PROCEDURE — 36415 COLL VENOUS BLD VENIPUNCTURE: CPT

## 2025-02-19 PROCEDURE — 85610 PROTHROMBIN TIME: CPT

## 2025-03-19 ENCOUNTER — APPOINTMENT (OUTPATIENT)
Dept: LAB | Facility: CLINIC | Age: OVER 89
End: 2025-03-19
Payer: COMMERCIAL

## 2025-03-19 DIAGNOSIS — I48.91 ATRIAL FIBRILLATION, UNSPECIFIED TYPE (HCC): ICD-10-CM

## 2025-03-19 LAB
INR PPP: 2.21 (ref 0.85–1.19)
PROTHROMBIN TIME: 24.5 SECONDS (ref 12.3–15)

## 2025-03-19 PROCEDURE — 36415 COLL VENOUS BLD VENIPUNCTURE: CPT

## 2025-03-19 PROCEDURE — 85610 PROTHROMBIN TIME: CPT

## 2025-04-16 ENCOUNTER — APPOINTMENT (OUTPATIENT)
Dept: LAB | Facility: CLINIC | Age: OVER 89
End: 2025-04-16
Payer: COMMERCIAL

## 2025-04-16 ENCOUNTER — TRANSCRIBE ORDERS (OUTPATIENT)
Dept: LAB | Facility: CLINIC | Age: OVER 89
End: 2025-04-16

## 2025-04-16 DIAGNOSIS — I48.91 ATRIAL FIBRILLATION, UNSPECIFIED TYPE (HCC): ICD-10-CM

## 2025-04-16 DIAGNOSIS — I48.91 ATRIAL FIBRILLATION, UNSPECIFIED TYPE (HCC): Primary | ICD-10-CM

## 2025-04-16 LAB
INR PPP: 2.38 (ref 0.85–1.19)
PROTHROMBIN TIME: 25.9 SECONDS (ref 12.3–15)

## 2025-04-16 PROCEDURE — 85610 PROTHROMBIN TIME: CPT

## 2025-04-16 PROCEDURE — 36415 COLL VENOUS BLD VENIPUNCTURE: CPT

## 2025-05-14 ENCOUNTER — TRANSCRIBE ORDERS (OUTPATIENT)
Dept: LAB | Facility: CLINIC | Age: OVER 89
End: 2025-05-14

## 2025-05-14 ENCOUNTER — APPOINTMENT (OUTPATIENT)
Dept: LAB | Facility: CLINIC | Age: OVER 89
End: 2025-05-14
Payer: COMMERCIAL

## 2025-05-14 DIAGNOSIS — I48.91 ATRIAL FIBRILLATION, UNSPECIFIED TYPE (HCC): ICD-10-CM

## 2025-05-14 DIAGNOSIS — I48.91 ATRIAL FIBRILLATION, UNSPECIFIED TYPE (HCC): Primary | ICD-10-CM

## 2025-05-14 LAB
INR PPP: 4.12 (ref 0.85–1.19)
PROTHROMBIN TIME: 39.1 SECONDS (ref 12.3–15)

## 2025-05-14 PROCEDURE — 36415 COLL VENOUS BLD VENIPUNCTURE: CPT

## 2025-05-14 PROCEDURE — 85610 PROTHROMBIN TIME: CPT

## 2025-05-24 ENCOUNTER — TELEPHONE (OUTPATIENT)
Dept: OTHER | Facility: OTHER | Age: OVER 89
End: 2025-05-24

## 2025-05-24 NOTE — TELEPHONE ENCOUNTER
Nursing home or Independent living name: [facility]  If its not nursing home or Independent living, do they see PCP in Network:  Who is calling: Zeina  Callback number: [phone]  Symptoms: New admission/review orders  Did you page oncall or place in triage hub: esc Dr. Jasper Mondragon

## 2025-05-24 NOTE — TELEPHONE ENCOUNTER
Zeina from [facility] called reporting new admission and needing to verify orders    On call notified via epic chat

## 2025-05-27 ENCOUNTER — NURSING HOME VISIT (OUTPATIENT)
Dept: PULMONOLOGY | Facility: CLINIC | Age: OVER 89
End: 2025-05-27
Payer: COMMERCIAL

## 2025-05-27 ENCOUNTER — NURSING HOME VISIT (OUTPATIENT)
Dept: GERIATRICS | Facility: OTHER | Age: OVER 89
End: 2025-05-27
Payer: COMMERCIAL

## 2025-05-27 DIAGNOSIS — Z99.3 WHEELCHAIR DEPENDENT: ICD-10-CM

## 2025-05-27 DIAGNOSIS — I50.9 CONGESTIVE HEART FAILURE, UNSPECIFIED HF CHRONICITY, UNSPECIFIED HEART FAILURE TYPE (HCC): ICD-10-CM

## 2025-05-27 DIAGNOSIS — E11.22 TYPE 2 DIABETES MELLITUS WITH STAGE 3B CHRONIC KIDNEY DISEASE, WITHOUT LONG-TERM CURRENT USE OF INSULIN (HCC): ICD-10-CM

## 2025-05-27 DIAGNOSIS — Z74.09 IMPAIRED FUNCTIONAL MOBILITY, BALANCE, GAIT, AND ENDURANCE: ICD-10-CM

## 2025-05-27 DIAGNOSIS — J18.9 PNEUMONIA DUE TO INFECTIOUS ORGANISM, UNSPECIFIED LATERALITY, UNSPECIFIED PART OF LUNG: Primary | ICD-10-CM

## 2025-05-27 DIAGNOSIS — I27.20 PULMONARY HYPERTENSION (HCC): Primary | ICD-10-CM

## 2025-05-27 DIAGNOSIS — N18.32 TYPE 2 DIABETES MELLITUS WITH STAGE 3B CHRONIC KIDNEY DISEASE, WITHOUT LONG-TERM CURRENT USE OF INSULIN (HCC): ICD-10-CM

## 2025-05-27 DIAGNOSIS — R53.1 GENERALIZED WEAKNESS: ICD-10-CM

## 2025-05-27 DIAGNOSIS — N18.32 STAGE 3B CHRONIC KIDNEY DISEASE (HCC): ICD-10-CM

## 2025-05-27 DIAGNOSIS — I48.91 ATRIAL FIBRILLATION, UNSPECIFIED TYPE (HCC): ICD-10-CM

## 2025-05-27 DIAGNOSIS — J18.9 PNEUMONIA OF LEFT LOWER LOBE DUE TO INFECTIOUS ORGANISM: ICD-10-CM

## 2025-05-27 DIAGNOSIS — R07.81 RIB PAIN ON LEFT SIDE: ICD-10-CM

## 2025-05-27 DIAGNOSIS — J90 PLEURAL EFFUSION, LEFT: ICD-10-CM

## 2025-05-27 DIAGNOSIS — M48.061 SPINAL STENOSIS OF LUMBAR REGION WITHOUT NEUROGENIC CLAUDICATION: ICD-10-CM

## 2025-05-27 PROCEDURE — 99306 1ST NF CARE HIGH MDM 50: CPT | Performed by: FAMILY MEDICINE

## 2025-05-27 PROCEDURE — 99305 1ST NF CARE MODERATE MDM 35: CPT | Performed by: INTERNAL MEDICINE

## 2025-05-27 NOTE — PROGRESS NOTES
[facility]  [address]  [place] [address]   [facility]  History and Physical    NAME: Dmitriy Carven  AGE: 89 y.o. SEX: male [id number]    DATE OF ENCOUNTER: 5/27/2025    Code status:  CPR    Assessment and Plan     1. Pneumonia due to infectious organism, unspecified laterality, unspecified part of lung (Primary)    Clinically improved.   On cefpodoxime.     Monitor clinically.         2. Stage 3b chronic kidney disease (HCC)    Known CKD 3 b, likley component of MESHA as GFR is at 26.   Monitor labs.     3. Atrial fibrillation, unspecified type ([facility])  On rate control with metoprolol BID.     Is on coumadin , keep inr 2-3 , for stroke prevention.     Last INR on 5/24/2025 was 1.9.   Restart at 2.5 mg daily.     On admission came with supratherpaeutic INR at 9.2.       4. Spinal stenosis of lumbar region without neurogenic claudication    With chronic pain and had worsening low back pain.     Reports improvement of pain with current regimen.     On gabapentin 100 mg tid. May titrate as needed.   On robaxin   On oxycodone as needed for pain.           5. Congestive heart failure, unspecified HF chronicity, unspecified heart failure type (HCC)    Remains on bumex.   Monitor.   Currenlty euvolemic.     6. Type 2 diabetes mellitus with stage 3b chronic kidney disease, without long-term current use of insulin ([facility])    A1c on 5/19/2025 at 6.2.    Avoid intense blood sugar control.     Keep A1c < 7.5, if not < 8.     Currently on januvia and repaglinidne. Add holding parameters for repaglinide.   Blood sugars have been in the 200s, likely secondary to pneumonia.     Monitor and highly consider deprescribing of oral anti dm medications.   Due to ckd 3/4 would start with dc of januvia.     7. Impaired functional mobility, balance, gait, and endurance    Admit to [facility] for comprehensive rehab program.     He lives at [facility] as a long term resident.         8. Wheelchair  dependent      9. Generalized weakness  Admit to [facility] for str with plan for dc to [facility].     All medications and routine orders were reviewed and updated as needed.    Plan discussed with: Patient    Chief Complaint     Seen for admission at Nursing Facility    History of Present Illness     Dmitriy is a 89 year old male seen at bedside at [facility] . A long term resident at [facility] . Mostly wheelchair bound. Known chf, type 2 dm, ckd 3b, atrial fibrillation.     He is being seen for admission for comprehensive rehab program.   Admitted to [facility] on 5/19 after worsening back pain. He does have known lumbar disc disease and spinal stenosis.      He was however found to have pneumonia. Reviewed cxr and Ct scan findings. Improved with Antibioitics and currently discharged on cefpodoxime.   He has no fever, no chills. Reports eating okay, sleeping well. No significant coughing or chest congestion.     He has been on chronic pain medications. Currenlty on gabapentin. Reports pain is adequately controlled.     Dmitriy is on coumadin, review of chart showing 2.5/5 mg regimen. Admission INR was at 9.2. Dc inr was 1.9. He was not discharged on coumadin but with instruction to follow up with it.     He is seen at [facility]. Reports no issues, no significnat pain, no new concerns.             HISTORY:  Past Medical History[1]  Family History[2]  Social History[3]    Allergies:  Allergies[4]    Review of Systems     Review of Systems   Constitutional: Negative.  Negative for activity change, appetite change, chills and diaphoresis.   HENT:  Negative for congestion and dental problem.    Respiratory: Negative.  Negative for apnea, chest tightness, shortness of breath and wheezing.    Cardiovascular: Negative.  Negative for chest pain, palpitations and leg swelling.   Gastrointestinal: Negative.  Negative for abdominal distention, abdominal pain, constipation, diarrhea and nausea.   Genitourinary: Negative.  Negative for  difficulty urinating, dysuria and frequency.       Medications and orders     All medications reviewed and updated in [facility] EMR.      Objective     Vitals:   BP: 108/63 mmHg  5/26/2025 17:36   Temp:97.4 °F  5/26/2025 09:06 Pulse:73 bpm  5/26/2025 17:36 Weight:131.2 Lbs  5/25/2025 05:48   Resp:18 Breaths/min  5/26/2025 09:06 BS:135 mg/dL  5/27/2025 05:10 O2:96 %  5/26/2025 09:06 Pain:0  5/26/2025 09:06       Physical Exam  Constitutional:       General: He is not in acute distress.     Appearance: Normal appearance. He is well-developed.   HENT:      Head: Normocephalic and atraumatic.      Right Ear: External ear normal.      Left Ear: External ear normal.      Nose: Nose normal.      Mouth/Throat:      Mouth: Mucous membranes are moist.     Eyes:      Conjunctiva/sclera: Conjunctivae normal.      Pupils: Pupils are equal, round, and reactive to light.       Cardiovascular:      Rate and Rhythm: Normal rate and regular rhythm.      Heart sounds: Normal heart sounds. No murmur heard.     No friction rub. No gallop.   Pulmonary:      Effort: Pulmonary effort is normal. No respiratory distress.      Breath sounds: Normal breath sounds. No wheezing or rales.   Chest:      Chest wall: No tenderness.   Abdominal:      General: Bowel sounds are normal. There is no distension.      Palpations: Abdomen is soft. There is no mass.      Tenderness: There is no abdominal tenderness. There is no guarding or rebound.     Musculoskeletal:         General: Normal range of motion.      Cervical back: Normal range of motion and neck supple.     Skin:     General: Skin is warm.     Neurological:      Mental Status: He is alert and oriented to person, place, and time.     Psychiatric:         Attention and Perception: Attention normal.         Mood and Affect: Mood normal.         Behavior: Behavior normal.         Thought Content: Thought content normal.         Pertinent Laboratory/Diagnostic Studies:   The following  labs/studies were reviewed please see chart or hospital paperwork for details.    5/24/2025 INR is 1.9  5/23/2025 hemoglobin 8.4 hematocrit 25.7,  platelets 268, WBC 6.3  Sodium 138, potassium 4.6, chloride 106, CO2 24, BUN 68, creatinine 2.26, GFR 24.    Impression:   1. No acute post traumatic abnormality identified in the chest, abdomen, or   pelvis.   2. Small to moderate layering left pleural effusion and small layering right   pleural effusion. Mild nonspecific patchy nodular airspace opacity in the left   lower lobe and to lesser degree the posterior left upper lobe. The findings   could be infectious or inflammatory. No overt failure.   3. No acute abdominal or pelvic pathology. No antegrade bowel obstruction. No   evidence of diverticulitis. Moderate stool seen within the colon.   4. Large hiatus hernia again noted, the majority of the stomach resides within   the chest. This is similar in configuration when compared to prior CT.   5. Diffuse atherosclerotic disease as described above.   6. Other findings as above.       I have spent a total time of 55 minutes in caring for this patient on the day of the visit/encounter including Risks and benefits of tx options, Instructions for management, Impressions, Counseling / Coordination of care, Documenting in the medical record, Reviewing/placing orders in the medical record (including tests, medications, and/or procedures), and Obtaining or reviewing history  .              [1]  No past medical history on file.[2]  No family history on file.[3]  Social History  Socioeconomic History   • Marital status:      Social Drivers of Health     Financial Resource Strain: Not At Risk (5/19/2025)    Received from [facility]    Financial Insecurity    • In the last 12 months did you skip medications to save money?: No    • In the last 12 months was there a time when you needed to see a doctor but could not because of cost?: No   Food  Insecurity: No Food Insecurity (5/19/2025)    Received from [facility]    Food Insecurity    • In the last 12 months did you ever eat less than you felt you should because there wasn't enough money for food?: No   Transportation Needs: No Transportation Needs (5/19/2025)    Received from [facility]    Transportation Needs    • In the last 12 months have you ever had to go without healthcare because you didn't have a way to get there?: No   Social Connections: Socially Integrated (5/19/2025)    Received from [facility]    Social Connection    • Do you often feel lonely?: No   Intimate Partner Violence: Not At Risk (4/23/2024)    Received from [facility]    Humiliation, Afraid, Rape, and Kick questionnaire    • Within the last year, have you been afraid of your partner or ex-partner?: No    • Within the last year, have you been humiliated or emotionally abused in other ways by your partner or ex-partner?: No    • Within the last year, have you been kicked, hit, slapped, or otherwise physically hurt by your partner or ex-partner?: No    • Within the last year, have you been raped or forced to have any kind of sexual activity by your partner or ex-partner?: No   Housing Stability: Not At Risk (5/19/2025)    Received from [facility]    Housing Stability    • Are you worried that in the next 2 months you may not have stable housing?: No   [4]  Not on File

## 2025-05-29 ENCOUNTER — NURSING HOME VISIT (OUTPATIENT)
Dept: GERIATRICS | Facility: OTHER | Age: OVER 89
End: 2025-05-29
Payer: COMMERCIAL

## 2025-05-29 DIAGNOSIS — E03.9 ACQUIRED HYPOTHYROIDISM: ICD-10-CM

## 2025-05-29 DIAGNOSIS — M48.061 SPINAL STENOSIS OF LUMBAR REGION WITHOUT NEUROGENIC CLAUDICATION: ICD-10-CM

## 2025-05-29 DIAGNOSIS — N18.32 STAGE 3B CHRONIC KIDNEY DISEASE (HCC): ICD-10-CM

## 2025-05-29 DIAGNOSIS — R26.2 AMBULATORY DYSFUNCTION: ICD-10-CM

## 2025-05-29 DIAGNOSIS — I48.91 ATRIAL FIBRILLATION, UNSPECIFIED TYPE (HCC): ICD-10-CM

## 2025-05-29 DIAGNOSIS — J18.9 PNEUMONIA OF LEFT LOWER LOBE DUE TO INFECTIOUS ORGANISM: Primary | ICD-10-CM

## 2025-05-29 PROBLEM — J90 PLEURAL EFFUSION, LEFT: Status: ACTIVE | Noted: 2025-05-29

## 2025-05-29 PROBLEM — R07.81 RIB PAIN ON LEFT SIDE: Status: ACTIVE | Noted: 2025-05-29

## 2025-05-29 PROBLEM — I27.20 PULMONARY HYPERTENSION (HCC): Status: ACTIVE | Noted: 2025-05-29

## 2025-05-29 PROCEDURE — 99309 SBSQ NF CARE MODERATE MDM 30: CPT

## 2025-05-29 NOTE — ASSESSMENT & PLAN NOTE
Unclear etiology; no comment on fracture on imaging during hospitalization; cont supportive care for now. If no relief, consider discussion with radiology to see if rib dedicated imaging would help.

## 2025-05-29 NOTE — PROGRESS NOTES
Consultation - Pulmonary Medicine   Name: Dmitriy Craven      : 3/12/1932      MRN: [id number]  Encounter Provider: Tran Hager DO  Encounter Date: 2025   Encounter department: [facility] [place]    Short term    Reason for Consult: pneumonia  Assessment & Plan  Pulmonary hypertension (HCC)  Noted on ECHO in ; euvolemic on exam         Pleural effusion, left  B/L effusions on CT chest, left >right  - Pt is asymptomatic from pulmonary standpoint  - Not planned for intervention at this time. If worsens, would pursue thoracentesis          Pneumonia of left lower lobe due to infectious organism  Treated with antibiotics and symptoms are improved  Would not pursue follow up CXR given age and improvement of symptoms         Rib pain on left side  Unclear etiology; no comment on fracture on imaging during hospitalization; cont supportive care for now. If no relief, consider discussion with radiology to see if rib dedicated imaging would help.         Discussed on rounds. Follow up with us PRN.    History of Present Illness   Dmitriy Craven is a 89 y.o. male PMH afib, HTN, HFpEF, HLD, CKD, DM type II who is here for short term rehab.  Pt was admitted - at [facility] for increasing back pain x 3 weeks. Found to have cough with yellow sputum, treated for community acquired pneumonia with Ceftriaxone and Azithromycin. Complicated by supratherapeutic INR.  Found to have b/l effusions, moderate on left and small on right.     Found in wheelchair, in room. Reading. States his cough is resolved. Denies SOB.     Not home oxygen or inhalers. Very remote smoking history.    Complains of ongoing left-sided rib pain that waxes and wanes. Not reproducible with palpation.    Review of Systems  Aside from what is mentioned in the HPI, ROS is otherwise negative    Medical History Reviewed by provider this encounter:     .    Historical Information   Past Medical History[1]  Past Surgical  History[2]  Social History   Tobacco Use History[3]    Smoked in his 20s.    Family History:   Family History[4]    Objective     147/59; HR 75; 98.3; 97% RA     Physical Exam  Gen: no distress, comfortable  Resp: clear b/l bs; no pain with palpation of left 9th and 10th rib  CV: irregular    Diagnostic Data:  Labs: I personally reviewed the most recent laboratory data pertinent to today's visit.  5/23/25 CBC per my review shows anemia hemoglobin 8.4 g/dL; elevated creatinine 2.26; hyperglycemia    Radiology results:    5/19/25 CT Ch/A/P shows left pleural effusion, large hiatal hernia, cad    Other studies:  ECHO 4/15/23: EF normal, RV dilated with reduced systolic function; moderate TR; PAP 75 mm Hg      Tran Hager DO           [1] No past medical history on file.  [2] No past surgical history on file.  [3]   Social History  Tobacco Use   Smoking Status Not on file   Smokeless Tobacco Not on file   [4] No family history on file.

## 2025-05-29 NOTE — ASSESSMENT & PLAN NOTE
Treated with antibiotics and symptoms are improved  Would not pursue follow up CXR given age and improvement of symptoms

## 2025-05-29 NOTE — PROGRESS NOTES
[facility]  [address] [address] [phone]  [facility]  Code 31 (STR)          NAME: Dmitriy Craven  AGE: 89 y.o. SEX: male CODE STATUS: CPR    DATE OF ENCOUNTER: 5/30/2025    Assessment and Plan     1. Pneumonia of left lower lobe due to infectious organism  Assessment & Plan:  Recently hospitalized  Found to have Pneumonia  Completed cefpodoxime   Patient reports improvement of symptoms  2. Atrial fibrillation, unspecified type (HCC)  Assessment & Plan:  HR controlled, 75  Denies cp/palpitations  Continue metoprolol 75 mg BID  Continue coumadin  Monitor INR  3. Stage 3b chronic kidney disease (HCC)  Assessment & Plan:  Lab Results   Component Value Date    EGFR 26 (L) 05/23/2025    EGFR 27 (L) 05/22/2025    EGFR 32 (L) 05/21/2025    CREATININE 2.26 (H) 05/23/2025    CREATININE 2.19 (H) 05/22/2025    CREATININE 1.9 (H) 05/21/2025   Baseline Cr 2.2  Monitor kidney functions  Encourage adequate PO hydration  Avoid nephrotoxins  4. Spinal stenosis of lumbar region without neurogenic claudication  Assessment & Plan:  Recently hospitalized with worsening back pain  Has hx of chronic pain  Reports felling better today  Continue gabapentin, oxy, methocarbamol  Continue pt.ot  Fall precautions  5. Ambulatory dysfunction  Assessment & Plan:  Multifactorial in the setting of advanced age, chronic conditions  Continue PT/OT  Fall Precautions  Ensure adequate nutrition/hydration   Monitor CBC/BMP    following for d/c planning    6. Acquired hypothyroidism  Assessment & Plan:  Lab Results   Component Value Date    [id number] 3.180 01/08/2025    TSH 6.27 (H) 05/02/2024    Continue levothyroxine 50 mcg daily       All medications and routine orders were reviewed and updated as needed.    Chief Complaint     STR follow up visit  Patient's care was coordinated with nursing facility staff. Recent vitals, labs, and updated medications were review on [facility] system in  facility.  Past Medical and Surgical History      Past Medical History[1]  Past Surgical History[2]  Not on File       History of Present Illness     HPI  Dmitriy Craven is a 89 year old male, he is a STR patient of [facility] since 5/24/25. Past Medical Hx including but not limited to HTN, Afib, CKD. He was seen in collaboration with nursing for medical mgmt and STR follow up.     Hospital Course  Patient was admitted to the hospital 5/19/25 from [facility] for c/o back pain. He was found to pneumonia and improved with antibiotics. He was discharged on cefpodoxime to complete course. Patient was recommended fro rehab and was discharged to [facility].    Rehab Course  Dmitriy was seen and examined at bedside today. He is AAOx3. On exam he is in his wheelchair and does not appear to be in distress. He states he lives at [facility] and typically will use a wheelchair for long distances.  He denies pain, difficulty sleeping, and has been eating well. He says therapy has been going well and he is feeling better.  Denies CP/SOB/N/V/D. Denies lightheadedness, dizziness, headaches, vision changes. Patient states they are eating well and staying hydrated. Denies any bowel or bladder issues. Per review of SNF records, Patient is eating 3 meals per day, consuming  %. Last documented BM 5/28/25. No concerns from nursing at this time.    The patient's allergies, past medical, surgical, social and family history were reviewed and unchanged.    Review of Systems     Review of Systems   Constitutional:  Positive for activity change. Negative for appetite change and fever.   HENT:  Negative for congestion.    Respiratory:  Negative for cough, shortness of breath and wheezing.    Cardiovascular:  Negative for chest pain and palpitations.   Gastrointestinal:  Negative for constipation, diarrhea, nausea and vomiting.   Genitourinary:  Negative for difficulty urinating and dysuria.   Musculoskeletal:   "Positive for gait problem.   Skin: Negative.    Neurological:  Positive for weakness.   Psychiatric/Behavioral:  Negative for confusion and sleep disturbance.          Objective     Vitals:   Vitals:    05/30/25 0919   BP: 147/59   Pulse: 75   Resp: 18   Temp: 98.3 °F (36.8 °C)   SpO2: 97%         Physical Exam  Vitals and nursing note reviewed.   Constitutional:       General: He is not in acute distress.     Appearance: Normal appearance. He is not ill-appearing.   HENT:      Head: Normocephalic and atraumatic.     Eyes:      Conjunctiva/sclera: Conjunctivae normal.       Cardiovascular:      Rate and Rhythm: Normal rate and regular rhythm.      Heart sounds: Normal heart sounds.   Pulmonary:      Effort: Pulmonary effort is normal. No respiratory distress.      Breath sounds: Normal breath sounds. No wheezing.   Abdominal:      General: There is no distension.      Tenderness: There is no abdominal tenderness.     Skin:     General: Skin is warm.     Neurological:      Mental Status: He is alert and oriented to person, place, and time.      Motor: Weakness present.      Gait: Gait abnormal.     Psychiatric:         Mood and Affect: Mood normal.         Behavior: Behavior normal.         Pertinent Laboratory/Diagnostic Studies:   Reviewed in facility chart-stable      Current Medications   Medications reviewed and updated see facility MAR for details.    Current Medications[3]     Please note:  Voice-recognition software may have been used in the preparation of this document.  Occasional wrong word or \"sound-alike\" substitutions may have occurred due to the inherent limitations of voice recognition software.  Interpretation should be guided by context.         HAILEY Haque  5/30/2025  9:54 AM         [1] No past medical history on file.  [2] No past surgical history on file.  [3]   Current Outpatient Medications:     acetaminophen (TYLENOL) 500 mg tablet, Take 1,000 mg by mouth every 8 (eight) hours " as needed for mild pain, Disp: , Rfl:     allopurinol (ZYLOPRIM) 100 mg tablet, Take 100 mg by mouth daily, Disp: , Rfl:     ascorbic acid (VITAMIN C) 500 MG tablet, Take 500 mg by mouth in the morning and 500 mg in the evening., Disp: , Rfl:     bumetanide (BUMEX) 1 mg tablet, Take 1 mg by mouth daily, Disp: , Rfl:     Dextromethorphan Polistirex ER (Delsym) 30 MG/5ML SUER, Take 5 mL by mouth every 12 (twelve) hours as needed (cough), Disp: , Rfl:     ferrous sulfate 325 (65 FE) MG EC tablet, Take 325 mg by mouth every other day, Disp: , Rfl:     gabapentin (NEURONTIN) 100 mg capsule, Take 100 mg by mouth 3 (three) times a day, Disp: , Rfl:     levothyroxine 50 mcg tablet, Take 50 mcg by mouth daily, Disp: , Rfl:     Lidocaine 4 % PTCH, Apply 1 patch topically in the morning. Apply to back daily, remove after 12 hours., Disp: , Rfl:     melatonin 3 mg, Take 3 mg by mouth daily at bedtime, Disp: , Rfl:     menthol-cetylpyridinium (CEPACOL) 3 MG lozenge, Take 1 lozenge by mouth as needed for sore throat, Disp: , Rfl:     methocarbamol (ROBAXIN) 500 mg tablet, Take 500 mg by mouth every 8 (eight) hours as needed for muscle spasms, Disp: , Rfl:     metoprolol tartrate (LOPRESSOR) 50 mg tablet, Take 75 mg by mouth every 12 (twelve) hours, Disp: , Rfl:     Multiple Vitamins-Minerals (multivitamin with minerals) tablet, Take 1 tablet by mouth daily, Disp: , Rfl:     oxyCODONE (ROXICODONE) 5 immediate release tablet, Take 2.5 mg by mouth every 4 (four) hours as needed for moderate pain, Disp: , Rfl:     repaglinide (PRANDIN) 0.5 mg tablet, Take 0.5 mg by mouth 3 (three) times a day before meals, Disp: , Rfl:     simethicone (MYLICON) 80 mg chewable tablet, Chew 80 mg every 6 (six) hours as needed for flatulence, Disp: , Rfl:     sitaGLIPtin (JANUVIA) 25 mg tablet, Take 25 mg by mouth daily, Disp: , Rfl:     warfarin (COUMADIN) 3 mg tablet, Take 3 mg by mouth daily at bedtime, Disp: , Rfl:

## 2025-05-29 NOTE — ASSESSMENT & PLAN NOTE
B/L effusions on CT chest, left >right  - Pt is asymptomatic from pulmonary standpoint  - Not planned for intervention at this time. If worsens, would pursue thoracentesis

## 2025-05-30 VITALS
SYSTOLIC BLOOD PRESSURE: 147 MMHG | TEMPERATURE: 98.3 F | RESPIRATION RATE: 18 BRPM | OXYGEN SATURATION: 97 % | DIASTOLIC BLOOD PRESSURE: 59 MMHG | HEART RATE: 75 BPM

## 2025-05-30 PROBLEM — E03.9 ACQUIRED HYPOTHYROIDISM: Status: ACTIVE | Noted: 2025-05-30

## 2025-05-30 PROBLEM — R26.2 AMBULATORY DYSFUNCTION: Status: ACTIVE | Noted: 2025-05-30

## 2025-05-30 RX ORDER — OXYCODONE HYDROCHLORIDE 5 MG/1
2.5 TABLET ORAL EVERY 4 HOURS PRN
COMMUNITY
End: 2025-06-05

## 2025-05-30 RX ORDER — REPAGLINIDE 0.5 MG/1
0.5 TABLET ORAL
COMMUNITY

## 2025-05-30 RX ORDER — BUMETANIDE 1 MG/1
1 TABLET ORAL DAILY
COMMUNITY

## 2025-05-30 RX ORDER — ACETAMINOPHEN 500 MG
1000 TABLET ORAL EVERY 8 HOURS PRN
COMMUNITY

## 2025-05-30 RX ORDER — SIMETHICONE 80 MG
80 TABLET,CHEWABLE ORAL EVERY 6 HOURS PRN
COMMUNITY

## 2025-05-30 RX ORDER — LEVOTHYROXINE SODIUM 50 UG/1
50 TABLET ORAL DAILY
COMMUNITY

## 2025-05-30 RX ORDER — DEXTROMETHORPHAN POLISTIREX 30 MG/5ML
5 SUSPENSION ORAL EVERY 12 HOURS PRN
COMMUNITY

## 2025-05-30 RX ORDER — ALLOPURINOL 100 MG/1
100 TABLET ORAL DAILY
COMMUNITY

## 2025-05-30 RX ORDER — FERROUS SULFATE 325(65) MG
325 TABLET, DELAYED RELEASE (ENTERIC COATED) ORAL EVERY OTHER DAY
COMMUNITY

## 2025-05-30 RX ORDER — METHOCARBAMOL 500 MG/1
500 TABLET, FILM COATED ORAL EVERY 8 HOURS PRN
COMMUNITY

## 2025-05-30 RX ORDER — GABAPENTIN 100 MG/1
100 CAPSULE ORAL 3 TIMES DAILY
COMMUNITY

## 2025-05-30 RX ORDER — ASCORBIC ACID 500 MG
500 TABLET ORAL 2 TIMES DAILY
COMMUNITY

## 2025-05-30 RX ORDER — MULTIVIT-MIN/IRON FUM/FOLIC AC 7.5 MG-4
1 TABLET ORAL DAILY
COMMUNITY

## 2025-05-30 RX ORDER — LIDOCAINE 4 G/G
1 PATCH TOPICAL DAILY
COMMUNITY

## 2025-05-30 RX ORDER — WARFARIN SODIUM 3 MG/1
3 TABLET ORAL
COMMUNITY
End: 2025-06-05

## 2025-05-30 RX ORDER — METOPROLOL TARTRATE 50 MG
75 TABLET ORAL EVERY 12 HOURS SCHEDULED
COMMUNITY

## 2025-05-30 NOTE — ASSESSMENT & PLAN NOTE
Recently hospitalized  Found to have Pneumonia  Completed cefpodoxime   Patient reports improvement of symptoms

## 2025-05-30 NOTE — ASSESSMENT & PLAN NOTE
HR controlled, 75  Denies cp/palpitations  Continue metoprolol 75 mg BID  Continue coumadin  Monitor INR

## 2025-05-30 NOTE — ASSESSMENT & PLAN NOTE
Lab Results   Component Value Date    [id number] 3.180 01/08/2025    TSH 6.27 (H) 05/02/2024    Continue levothyroxine 50 mcg daily

## 2025-05-30 NOTE — ASSESSMENT & PLAN NOTE
Lab Results   Component Value Date    EGFR 26 (L) 05/23/2025    EGFR 27 (L) 05/22/2025    EGFR 32 (L) 05/21/2025    CREATININE 2.26 (H) 05/23/2025    CREATININE 2.19 (H) 05/22/2025    CREATININE 1.9 (H) 05/21/2025   Baseline Cr 2.2  Monitor kidney functions  Encourage adequate PO hydration  Avoid nephrotoxins

## 2025-05-30 NOTE — ASSESSMENT & PLAN NOTE
Multifactorial in the setting of advanced age, chronic conditions  Continue PT/OT  Fall Precautions  Ensure adequate nutrition/hydration   Monitor CBC/BMP    following for d/c planning

## 2025-05-31 ENCOUNTER — TELEPHONE (OUTPATIENT)
Dept: OTHER | Facility: OTHER | Age: OVER 89
End: 2025-05-31

## 2025-06-03 ENCOUNTER — NURSING HOME VISIT (OUTPATIENT)
Dept: GERIATRICS | Facility: OTHER | Age: OVER 89
End: 2025-06-03
Payer: COMMERCIAL

## 2025-06-03 VITALS
RESPIRATION RATE: 18 BRPM | TEMPERATURE: 97.2 F | DIASTOLIC BLOOD PRESSURE: 66 MMHG | SYSTOLIC BLOOD PRESSURE: 132 MMHG | HEART RATE: 74 BPM | OXYGEN SATURATION: 98 %

## 2025-06-03 DIAGNOSIS — I48.91 ATRIAL FIBRILLATION, UNSPECIFIED TYPE (HCC): Primary | ICD-10-CM

## 2025-06-03 DIAGNOSIS — J18.9 PNEUMONIA OF LEFT LOWER LOBE DUE TO INFECTIOUS ORGANISM: ICD-10-CM

## 2025-06-03 DIAGNOSIS — M48.061 SPINAL STENOSIS OF LUMBAR REGION WITHOUT NEUROGENIC CLAUDICATION: ICD-10-CM

## 2025-06-03 DIAGNOSIS — N18.32 STAGE 3B CHRONIC KIDNEY DISEASE (HCC): ICD-10-CM

## 2025-06-03 DIAGNOSIS — E03.9 ACQUIRED HYPOTHYROIDISM: ICD-10-CM

## 2025-06-03 DIAGNOSIS — R26.2 AMBULATORY DYSFUNCTION: ICD-10-CM

## 2025-06-03 PROCEDURE — 99309 SBSQ NF CARE MODERATE MDM 30: CPT

## 2025-06-03 NOTE — ASSESSMENT & PLAN NOTE
Recently hospitalized with worsening back pain  Has hx of chronic pain  Reports felling better today  Continue gabapentin, oxy, methocarbamol  Continue pt.ot  Fall precautions

## 2025-06-03 NOTE — ASSESSMENT & PLAN NOTE
Lab Results   Component Value Date    [id number] 3.180 01/08/2025    TSH 6.27 (H) 05/02/2024    Continue levothyroxine 50 mcg daily

## 2025-06-03 NOTE — ASSESSMENT & PLAN NOTE
HR controlled, 74  Denies cp/palpitations  Continue metoprolol 75 mg BID  Continue coumadin 4 mg at HS  INR 6/2/25 was 2.2, repeat 6/9/25

## 2025-06-03 NOTE — PROGRESS NOTES
[facility]  [address] [address] [phone]  [facility]  Code 31 (STR)          NAME: Dmitriy Craven  AGE: 89 y.o. SEX: male CODE STATUS: CPR    DATE OF ENCOUNTER: 6/3/2025    Assessment and Plan     1. Atrial fibrillation, unspecified type (HCC)  Assessment & Plan:  HR controlled, 74  Denies cp/palpitations  Continue metoprolol 75 mg BID  Continue coumadin 4 mg at HS  INR 6/2/25 was 2.2, repeat 6/9/25  2. Pneumonia of left lower lobe due to infectious organism  Assessment & Plan:  Recently hospitalized  Found to have Pneumonia  Completed cefpodoxime   Patient reports improvement of symptoms  3. Acquired hypothyroidism  Assessment & Plan:  Lab Results   Component Value Date    [id number] 3.180 01/08/2025    TSH 6.27 (H) 05/02/2024    Continue levothyroxine 50 mcg daily  4. Stage 3b chronic kidney disease (HCC)  Assessment & Plan:  Lab Results   Component Value Date    EGFR 26 (L) 05/23/2025    EGFR 27 (L) 05/22/2025    EGFR 32 (L) 05/21/2025    CREATININE 2.26 (H) 05/23/2025    CREATININE 2.19 (H) 05/22/2025    CREATININE 1.9 (H) 05/21/2025   Baseline Cr 2.2  Monitor kidney functions  Encourage adequate PO hydration  Avoid nephrotoxins  5. Ambulatory dysfunction  Assessment & Plan:  Multifactorial in the setting of advanced age, chronic conditions  Continue PT/OT  Fall Precautions  Ensure adequate nutrition/hydration   Monitor CBC/BMP    following for d/c planning    6. Spinal stenosis of lumbar region without neurogenic claudication  Assessment & Plan:  Recently hospitalized with worsening back pain  Has hx of chronic pain  Reports felling better today  Continue gabapentin, oxy, methocarbamol  Continue pt.ot  Fall precautions       All medications and routine orders were reviewed and updated as needed.    Chief Complaint     STR follow up visit  Patient's care was coordinated with nursing facility staff. Recent vitals, labs, and updated medications were review  on [facility] system in facility.  Past Medical and Surgical History      Past Medical History[1]  Past Surgical History[2]  Not on File       History of Present Illness     HPI  Dmitriy Craven is a 89 year old male, he is a STR patient of [facility] since 5/24/25. Past Medical Hx including but not limited to HTN, Afib, CKD. He was seen in collaboration with nursing for medical mgmt and STR follow up.     Hospital Course  Patient was admitted to the hospital 5/19/25 from [facility] for c/o back pain. He was found to pneumonia and improved with antibiotics. He was discharged on cefpodoxime to complete course. Patient was recommended fro rehab and was discharged to [facility].    Rehab Course  Dmitriy was seen and examined at bedside today. He is a resident at [facility] and uses wheelchair and walker for ambulation. He is AAOx3, appears comfortable. On exam he is in his wheelchair, in no distress. He says therapy has been going well. Hoping to discharge soon.  Denies CP/SOB/N/V/D. Denies lightheadedness, dizziness, headaches, vision changes. Patient states they are eating well and staying hydrated. Denies any bowel or bladder issues. Per review of SNF records, Patient is eating 3 meals per day, consuming  %. Last documented BM 6/2/25. No concerns from nursing at this time.    The patient's allergies, past medical, surgical, social and family history were reviewed and unchanged.    Review of Systems     Review of Systems   Constitutional:  Positive for activity change. Negative for appetite change and fever.   HENT:  Negative for congestion.    Respiratory:  Negative for cough, shortness of breath and wheezing.    Cardiovascular:  Negative for chest pain and palpitations.   Gastrointestinal:  Negative for constipation, diarrhea, nausea and vomiting.   Genitourinary:  Negative for difficulty urinating and dysuria.   Musculoskeletal:  Positive for gait problem.   Skin:  "Negative.    Neurological:  Positive for weakness.   Psychiatric/Behavioral:  Negative for confusion and sleep disturbance.          Objective     Vitals:   Vitals:    06/03/25 1354   BP: 132/66   Pulse: 74   Resp: 18   Temp: (!) 97.2 °F (36.2 °C)   SpO2: 98%         Physical Exam  Vitals and nursing note reviewed.   Constitutional:       General: He is not in acute distress.     Appearance: Normal appearance. He is not ill-appearing.   HENT:      Head: Normocephalic and atraumatic.     Eyes:      Conjunctiva/sclera: Conjunctivae normal.       Cardiovascular:      Rate and Rhythm: Normal rate and regular rhythm.      Heart sounds: Normal heart sounds.   Pulmonary:      Effort: Pulmonary effort is normal. No respiratory distress.      Breath sounds: Normal breath sounds. No wheezing.   Abdominal:      General: There is no distension.      Tenderness: There is no abdominal tenderness.     Skin:     General: Skin is warm.     Neurological:      Mental Status: He is alert and oriented to person, place, and time.      Motor: Weakness present.      Gait: Gait abnormal.     Psychiatric:         Mood and Affect: Mood normal.         Behavior: Behavior normal.         Pertinent Laboratory/Diagnostic Studies:   Reviewed in facility chart-stable      Current Medications   Medications reviewed and updated see facility MAR for details.    Current Medications[3]     Please note:  Voice-recognition software may have been used in the preparation of this document.  Occasional wrong word or \"sound-alike\" substitutions may have occurred due to the inherent limitations of voice recognition software.  Interpretation should be guided by context.         HAILEY Haque  6/3/2025  2:05 PM         [1] No past medical history on file.  [2] No past surgical history on file.  [3]   Current Outpatient Medications:     acetaminophen (TYLENOL) 500 mg tablet, Take 1,000 mg by mouth every 8 (eight) hours as needed for mild pain, Disp: " , Rfl:     allopurinol (ZYLOPRIM) 100 mg tablet, Take 100 mg by mouth daily, Disp: , Rfl:     ascorbic acid (VITAMIN C) 500 MG tablet, Take 500 mg by mouth in the morning and 500 mg in the evening., Disp: , Rfl:     bumetanide (BUMEX) 1 mg tablet, Take 1 mg by mouth daily, Disp: , Rfl:     Dextromethorphan Polistirex ER (Delsym) 30 MG/5ML SUER, Take 5 mL by mouth every 12 (twelve) hours as needed (cough), Disp: , Rfl:     ferrous sulfate 325 (65 FE) MG EC tablet, Take 325 mg by mouth every other day, Disp: , Rfl:     gabapentin (NEURONTIN) 100 mg capsule, Take 100 mg by mouth 3 (three) times a day, Disp: , Rfl:     levothyroxine 50 mcg tablet, Take 50 mcg by mouth daily, Disp: , Rfl:     Lidocaine 4 % PTCH, Apply 1 patch topically in the morning. Apply to back daily, remove after 12 hours., Disp: , Rfl:     melatonin 3 mg, Take 3 mg by mouth daily at bedtime, Disp: , Rfl:     menthol-cetylpyridinium (CEPACOL) 3 MG lozenge, Take 1 lozenge by mouth as needed for sore throat, Disp: , Rfl:     methocarbamol (ROBAXIN) 500 mg tablet, Take 500 mg by mouth every 8 (eight) hours as needed for muscle spasms, Disp: , Rfl:     metoprolol tartrate (LOPRESSOR) 50 mg tablet, Take 75 mg by mouth every 12 (twelve) hours, Disp: , Rfl:     Multiple Vitamins-Minerals (multivitamin with minerals) tablet, Take 1 tablet by mouth daily, Disp: , Rfl:     oxyCODONE (ROXICODONE) 5 immediate release tablet, Take 2.5 mg by mouth every 4 (four) hours as needed for moderate pain, Disp: , Rfl:     repaglinide (PRANDIN) 0.5 mg tablet, Take 0.5 mg by mouth 3 (three) times a day before meals, Disp: , Rfl:     simethicone (MYLICON) 80 mg chewable tablet, Chew 80 mg every 6 (six) hours as needed for flatulence, Disp: , Rfl:     sitaGLIPtin (JANUVIA) 25 mg tablet, Take 25 mg by mouth daily, Disp: , Rfl:     warfarin (COUMADIN) 3 mg tablet, Take 3 mg by mouth daily at bedtime, Disp: , Rfl:

## 2025-06-05 ENCOUNTER — NURSING HOME VISIT (OUTPATIENT)
Dept: GERIATRICS | Facility: OTHER | Age: OVER 89
End: 2025-06-05
Payer: COMMERCIAL

## 2025-06-05 VITALS
TEMPERATURE: 97.3 F | RESPIRATION RATE: 18 BRPM | SYSTOLIC BLOOD PRESSURE: 116 MMHG | DIASTOLIC BLOOD PRESSURE: 64 MMHG | OXYGEN SATURATION: 97 % | HEART RATE: 72 BPM

## 2025-06-05 DIAGNOSIS — R26.2 AMBULATORY DYSFUNCTION: ICD-10-CM

## 2025-06-05 DIAGNOSIS — N18.32 STAGE 3B CHRONIC KIDNEY DISEASE (HCC): ICD-10-CM

## 2025-06-05 DIAGNOSIS — E03.9 ACQUIRED HYPOTHYROIDISM: ICD-10-CM

## 2025-06-05 DIAGNOSIS — I48.91 ATRIAL FIBRILLATION, UNSPECIFIED TYPE (HCC): Primary | ICD-10-CM

## 2025-06-05 DIAGNOSIS — J90 PLEURAL EFFUSION, LEFT: ICD-10-CM

## 2025-06-05 DIAGNOSIS — J18.9 PNEUMONIA OF LEFT LOWER LOBE DUE TO INFECTIOUS ORGANISM: ICD-10-CM

## 2025-06-05 PROCEDURE — 99310 SBSQ NF CARE HIGH MDM 45: CPT

## 2025-06-05 RX ORDER — WARFARIN SODIUM 4 MG/1
4 TABLET ORAL
Status: ON HOLD | COMMUNITY
End: 2025-06-12

## 2025-06-05 NOTE — ASSESSMENT & PLAN NOTE
Patient with increased oxygen requirements, sat of 91%  Started on Duo nebs    EKG done showing Afib with RVR  Image in media  Denies cp/palpitations  Continue metoprolol 75 mg BID  Continue coumadin 4 mg at HS  INR 6/2/25 was 2.2, repeat 6/9/25

## 2025-06-05 NOTE — ASSESSMENT & PLAN NOTE
C/O moist cough, decreased O2 sat  Patient started on Duo-nebs  CXR done showing B/L basilar atelectasis, left effusion  Will increase Bumex to BID x 3 days  Repeat labs done today, awaiting results.

## 2025-06-05 NOTE — PROGRESS NOTES
[facility]  [address] [address] [phone]  [place] [facility]  Code 31 (STR)          NAME: Dmitriy Craven  AGE: 89 y.o. SEX: male CODE STATUS: CPR    DATE OF ENCOUNTER: 6/5/2025    Assessment and Plan     1. Atrial fibrillation, unspecified type (HCC)  Assessment & Plan:  Patient with increased oxygen requirements, sat of 91%  Started on Duo nebs    EKG done showing Afib with RVR  Image in media  Denies cp/palpitations  Continue metoprolol 75 mg BID  Continue coumadin 4 mg at HS  INR 6/2/25 was 2.2, repeat 6/9/25  2. Pneumonia of left lower lobe due to infectious organism  Assessment & Plan:  Recently hospitalized  Found to have Pneumonia  Completed cefpodoxime   Patient reports improvement of symptoms  3. Acquired hypothyroidism  Assessment & Plan:  Lab Results   Component Value Date    [id number] 3.180 01/08/2025    TSH 6.27 (H) 05/02/2024    Continue levothyroxine 50 mcg daily  4. Stage 3b chronic kidney disease (HCC)  Assessment & Plan:  Lab Results   Component Value Date    EGFR 26 (L) 05/23/2025    EGFR 27 (L) 05/22/2025    EGFR 32 (L) 05/21/2025    CREATININE 2.26 (H) 05/23/2025    CREATININE 2.19 (H) 05/22/2025    CREATININE 1.9 (H) 05/21/2025   Baseline Cr 2.2  Monitor kidney functions  Encourage adequate PO hydration  Avoid nephrotoxins  5. Ambulatory dysfunction  Assessment & Plan:  Multifactorial in the setting of advanced age, chronic conditions  Continue PT/OT  Fall Precautions  Ensure adequate nutrition/hydration   Monitor CBC/BMP    following for d/c planning    6. Pleural effusion, left  Assessment & Plan:  C/O moist cough, decreased O2 sat  Patient started on Duo-nebs  CXR done showing B/L basilar atelectasis, left effusion  Will increase Bumex to BID x 3 days  Repeat labs done today, awaiting results.       All medications and routine orders were reviewed and updated as needed.    Chief Complaint     STR follow up visit  Patient's care was  coordinated with nursing facility staff. Recent vitals, labs, and updated medications were review on [facility] system in facility.  Past Medical and Surgical History      Past Medical History[1]  Past Surgical History[2]  Not on File       History of Present Illness     HPI  Dmitriy Craven is a 89 year old male, he is a STR patient of [facility] since 5/24/25. Past Medical Hx including but not limited to HTN, Afib, CKD. He was seen in collaboration with nursing for medical mgmt and STR follow up.     Hospital Course  Patient was admitted to the hospital 5/19/25 from [facility] for c/o back pain. He was found to pneumonia and improved with antibiotics. He was discharged on cefpodoxime to complete course. Patient was recommended fro rehab and was discharged to [facility].    Rehab Course  Dmitriy was seen and examined at bedside today. He is a resident at [facility] and uses wheelchair and walker for ambulation.   Patient began with respiratory symptoms, moist cough, was noted to have decreased Oxygen saturation down to 91% from baseline of 97% on RA. RRT evaluated, ordered Dounebs scheduled. On exam patient had a HR of 125, EKG done showing Afib with RVR. STAT labs and CXR ordered. Patient denies cp, says he feels fine. LSC but with wheezes throughout. Will check VS q shift x 3 days. Oxycodone discontinued as patient had not been taking. Discussed POC with patients son, plan to treat in place and consider sending him to the ER if condition worsens. Will continue to monitor.    Denies CP/SOB/N/V/D. Denies lightheadedness, dizziness, headaches, vision changes. Patient states they are eating well and staying hydrated. Denies any bowel or bladder issues. Per review of SNF records, Patient is eating 3 meals per day, consuming  %. Last documented BM 6/3/25. No concerns from nursing at this time.    The patient's allergies, past medical, surgical, social and family  history were reviewed and unchanged.    Review of Systems     Review of Systems   Constitutional:  Positive for activity change. Negative for appetite change and fever.   HENT:  Negative for congestion.    Respiratory:  Negative for cough, shortness of breath and wheezing.    Cardiovascular:  Negative for chest pain and palpitations.   Gastrointestinal:  Negative for constipation, diarrhea, nausea and vomiting.   Genitourinary:  Negative for difficulty urinating and dysuria.   Musculoskeletal:  Positive for gait problem.   Skin: Negative.    Neurological:  Positive for weakness.   Psychiatric/Behavioral:  Negative for confusion and sleep disturbance.          Objective     Vitals:   Vitals:    06/05/25 0950   BP: 116/64   Pulse: 72   Resp: 18   Temp: (!) 97.3 °F (36.3 °C)   SpO2: 97%         Physical Exam  Vitals and nursing note reviewed.   Constitutional:       General: He is not in acute distress.     Appearance: Normal appearance. He is not ill-appearing.   HENT:      Head: Normocephalic and atraumatic.     Eyes:      Conjunctiva/sclera: Conjunctivae normal.       Cardiovascular:      Rate and Rhythm: Normal rate and regular rhythm.      Heart sounds: Normal heart sounds.   Pulmonary:      Effort: Pulmonary effort is normal. No respiratory distress.      Breath sounds: Normal breath sounds. No wheezing.   Abdominal:      General: There is no distension.      Tenderness: There is no abdominal tenderness.     Skin:     General: Skin is warm.     Neurological:      Mental Status: He is alert and oriented to person, place, and time.      Motor: Weakness present.      Gait: Gait abnormal.     Psychiatric:         Mood and Affect: Mood normal.         Behavior: Behavior normal.         Pertinent Laboratory/Diagnostic Studies:   Reviewed in facility chart-stable      Current Medications   Medications reviewed and updated see facility MAR for details.    Current Medications[3]     Please note:  Voice-recognition  "software may have been used in the preparation of this document.  Occasional wrong word or \"sound-alike\" substitutions may have occurred due to the inherent limitations of voice recognition software.  Interpretation should be guided by context.         HAILEY Haque  6/5/2025  2:08 PM         [1] No past medical history on file.  [2] No past surgical history on file.  [3]   Current Outpatient Medications:     acetaminophen (TYLENOL) 500 mg tablet, Take 1,000 mg by mouth every 8 (eight) hours as needed for mild pain, Disp: , Rfl:     allopurinol (ZYLOPRIM) 100 mg tablet, Take 100 mg by mouth daily, Disp: , Rfl:     ascorbic acid (VITAMIN C) 500 MG tablet, Take 500 mg by mouth in the morning and 500 mg in the evening., Disp: , Rfl:     bumetanide (BUMEX) 1 mg tablet, Take 1 mg by mouth daily, Disp: , Rfl:     Dextromethorphan Polistirex ER (Delsym) 30 MG/5ML SUER, Take 5 mL by mouth every 12 (twelve) hours as needed (cough), Disp: , Rfl:     ferrous sulfate 325 (65 FE) MG EC tablet, Take 325 mg by mouth every other day, Disp: , Rfl:     gabapentin (NEURONTIN) 100 mg capsule, Take 100 mg by mouth 3 (three) times a day, Disp: , Rfl:     levothyroxine 50 mcg tablet, Take 50 mcg by mouth daily, Disp: , Rfl:     Lidocaine 4 % PTCH, Apply 1 patch topically in the morning. Apply to back daily, remove after 12 hours., Disp: , Rfl:     melatonin 3 mg, Take 3 mg by mouth daily at bedtime, Disp: , Rfl:     menthol-cetylpyridinium (CEPACOL) 3 MG lozenge, Take 1 lozenge by mouth as needed for sore throat, Disp: , Rfl:     methocarbamol (ROBAXIN) 500 mg tablet, Take 500 mg by mouth every 8 (eight) hours as needed for muscle spasms, Disp: , Rfl:     metoprolol tartrate (LOPRESSOR) 50 mg tablet, Take 75 mg by mouth every 12 (twelve) hours, Disp: , Rfl:     Multiple Vitamins-Minerals (multivitamin with minerals) tablet, Take 1 tablet by mouth daily, Disp: , Rfl:     repaglinide (PRANDIN) 0.5 mg tablet, Take 0.5 mg by " mouth 3 (three) times a day before meals, Disp: , Rfl:     simethicone (MYLICON) 80 mg chewable tablet, Chew 80 mg every 6 (six) hours as needed for flatulence, Disp: , Rfl:     sitaGLIPtin (JANUVIA) 25 mg tablet, Take 25 mg by mouth daily, Disp: , Rfl:     warfarin (COUMADIN) 4 mg tablet, Take 4 mg by mouth daily at bedtime, Disp: , Rfl:

## 2025-06-09 ENCOUNTER — APPOINTMENT (EMERGENCY)
Dept: RADIOLOGY | Facility: HOSPITAL | Age: OVER 89
End: 2025-06-09
Payer: COMMERCIAL

## 2025-06-09 ENCOUNTER — HOSPITAL ENCOUNTER (INPATIENT)
Facility: HOSPITAL | Age: OVER 89
LOS: 3 days | Discharge: NURSING, REHAB OR LONG-TERM CARE | End: 2025-06-12
Attending: EMERGENCY MEDICINE | Admitting: STUDENT IN AN ORGANIZED HEALTH CARE EDUCATION/TRAINING PROGRAM
Payer: COMMERCIAL

## 2025-06-09 ENCOUNTER — APPOINTMENT (INPATIENT)
Dept: CT IMAGING | Facility: HOSPITAL | Age: OVER 89
End: 2025-06-09
Payer: COMMERCIAL

## 2025-06-09 ENCOUNTER — NURSING HOME VISIT (OUTPATIENT)
Dept: GERIATRICS | Facility: OTHER | Age: OVER 89
End: 2025-06-09
Payer: COMMERCIAL

## 2025-06-09 VITALS
HEART RATE: 71 BPM | SYSTOLIC BLOOD PRESSURE: 142 MMHG | RESPIRATION RATE: 20 BRPM | DIASTOLIC BLOOD PRESSURE: 68 MMHG | TEMPERATURE: 97.6 F | OXYGEN SATURATION: 97 %

## 2025-06-09 DIAGNOSIS — R79.1 SUPRATHERAPEUTIC INR: Primary | ICD-10-CM

## 2025-06-09 DIAGNOSIS — D64.9 CHRONIC ANEMIA: ICD-10-CM

## 2025-06-09 DIAGNOSIS — N18.9 ACUTE-ON-CHRONIC KIDNEY INJURY  (HCC): ICD-10-CM

## 2025-06-09 DIAGNOSIS — N17.9 ACUTE-ON-CHRONIC KIDNEY INJURY  (HCC): ICD-10-CM

## 2025-06-09 DIAGNOSIS — J18.9 PNEUMONIA DUE TO INFECTIOUS ORGANISM, UNSPECIFIED LATERALITY, UNSPECIFIED PART OF LUNG: ICD-10-CM

## 2025-06-09 DIAGNOSIS — I48.91 ATRIAL FIBRILLATION, UNSPECIFIED TYPE (HCC): ICD-10-CM

## 2025-06-09 DIAGNOSIS — M48.061 SPINAL STENOSIS OF LUMBAR REGION WITHOUT NEUROGENIC CLAUDICATION: ICD-10-CM

## 2025-06-09 DIAGNOSIS — N17.9 ACUTE RENAL FAILURE SUPERIMPOSED ON STAGE 4 CHRONIC KIDNEY DISEASE, UNSPECIFIED ACUTE RENAL FAILURE TYPE (HCC): ICD-10-CM

## 2025-06-09 DIAGNOSIS — D64.9 ANEMIA, UNSPECIFIED TYPE: ICD-10-CM

## 2025-06-09 DIAGNOSIS — N18.4 ACUTE RENAL FAILURE SUPERIMPOSED ON STAGE 4 CHRONIC KIDNEY DISEASE, UNSPECIFIED ACUTE RENAL FAILURE TYPE (HCC): ICD-10-CM

## 2025-06-09 DIAGNOSIS — D68.9 COAGULOPATHY (HCC): ICD-10-CM

## 2025-06-09 PROBLEM — I50.33 ACUTE ON CHRONIC HEART FAILURE WITH PRESERVED EJECTION FRACTION (HCC): Status: ACTIVE | Noted: 2025-06-09

## 2025-06-09 PROBLEM — R05.9 COUGH: Status: ACTIVE | Noted: 2025-06-09

## 2025-06-09 PROBLEM — J21.0 RSV (ACUTE BRONCHIOLITIS DUE TO RESPIRATORY SYNCYTIAL VIRUS): Status: ACTIVE | Noted: 2025-06-09

## 2025-06-09 LAB
ABO GROUP BLD: NORMAL
ABO GROUP BLD: NORMAL
ALBUMIN SERPL BCG-MCNC: 3.6 G/DL (ref 3.5–5)
ALP SERPL-CCNC: 140 U/L (ref 34–104)
ALT SERPL W P-5'-P-CCNC: 34 U/L (ref 7–52)
ANION GAP SERPL CALCULATED.3IONS-SCNC: 9 MMOL/L (ref 4–13)
ANISOCYTOSIS BLD QL SMEAR: PRESENT
AST SERPL W P-5'-P-CCNC: 31 U/L (ref 13–39)
BASOPHILS # BLD MANUAL: 0 THOUSAND/UL (ref 0–0.1)
BASOPHILS NFR MAR MANUAL: 0 % (ref 0–1)
BILIRUB SERPL-MCNC: 0.57 MG/DL (ref 0.2–1)
BLD GP AB SCN SERPL QL: NEGATIVE
BNP SERPL-MCNC: 1476 PG/ML (ref 0–100)
BUN SERPL-MCNC: 125 MG/DL (ref 5–25)
CALCIUM SERPL-MCNC: 9.3 MG/DL (ref 8.4–10.2)
CHLORIDE SERPL-SCNC: 99 MMOL/L (ref 96–108)
CO2 SERPL-SCNC: 27 MMOL/L (ref 21–32)
CREAT SERPL-MCNC: 2.43 MG/DL (ref 0.6–1.3)
EOSINOPHIL # BLD MANUAL: 0.18 THOUSAND/UL (ref 0–0.4)
EOSINOPHIL NFR BLD MANUAL: 2 % (ref 0–6)
ERYTHROCYTE [DISTWIDTH] IN BLOOD BY AUTOMATED COUNT: 17.5 % (ref 11.6–15.1)
FLUAV RNA RESP QL NAA+PROBE: NEGATIVE
FLUBV RNA RESP QL NAA+PROBE: NEGATIVE
GFR SERPL CREATININE-BSD FRML MDRD: 22 ML/MIN/1.73SQ M
GLUCOSE SERPL-MCNC: 113 MG/DL (ref 65–140)
GLUCOSE SERPL-MCNC: 133 MG/DL (ref 65–140)
GLUCOSE SERPL-MCNC: 193 MG/DL (ref 65–140)
HCT VFR BLD AUTO: 23.8 % (ref 36.5–49.3)
HEMOCCULT STL QL IA: POSITIVE
HGB BLD-MCNC: 7.8 G/DL (ref 12–17)
INR PPP: 11.83 (ref 0.85–1.19)
L PNEUMO1 AG UR QL IA.RAPID: NEGATIVE
LIPASE SERPL-CCNC: 35 U/L (ref 11–82)
LYMPHOCYTES # BLD AUTO: 1.83 THOUSAND/UL (ref 0.6–4.47)
LYMPHOCYTES # BLD AUTO: 20 % (ref 14–44)
MAGNESIUM SERPL-MCNC: 2.4 MG/DL (ref 1.9–2.7)
MCH RBC QN AUTO: 34.7 PG (ref 26.8–34.3)
MCHC RBC AUTO-ENTMCNC: 32.8 G/DL (ref 31.4–37.4)
MCV RBC AUTO: 106 FL (ref 82–98)
MONOCYTES # BLD AUTO: 0.73 THOUSAND/UL (ref 0–1.22)
MONOCYTES NFR BLD: 8 % (ref 4–12)
NEUTROPHILS # BLD MANUAL: 6.4 THOUSAND/UL (ref 1.85–7.62)
NEUTS BAND NFR BLD MANUAL: 2 % (ref 0–8)
NEUTS SEG NFR BLD AUTO: 68 % (ref 43–75)
OVALOCYTES BLD QL SMEAR: PRESENT
PLATELET # BLD AUTO: 307 THOUSANDS/UL (ref 149–390)
PLATELET BLD QL SMEAR: ADEQUATE
PMV BLD AUTO: 10.5 FL (ref 8.9–12.7)
POLYCHROMASIA BLD QL SMEAR: PRESENT
POTASSIUM SERPL-SCNC: 4.5 MMOL/L (ref 3.5–5.3)
PROCALCITONIN SERPL-MCNC: 1.42 NG/ML
PROT SERPL-MCNC: 7.4 G/DL (ref 6.4–8.4)
PROTHROMBIN TIME: 89.1 SECONDS (ref 12.3–15)
RBC # BLD AUTO: 2.25 MILLION/UL (ref 3.88–5.62)
RBC MORPH BLD: PRESENT
RH BLD: NEGATIVE
RH BLD: NEGATIVE
RSV RNA RESP QL NAA+PROBE: POSITIVE
S PNEUM AG UR QL: NEGATIVE
SARS-COV-2 RNA RESP QL NAA+PROBE: NEGATIVE
SODIUM SERPL-SCNC: 135 MMOL/L (ref 135–147)
SPECIMEN EXPIRATION DATE: NORMAL
WBC # BLD AUTO: 9.14 THOUSAND/UL (ref 4.31–10.16)

## 2025-06-09 PROCEDURE — 71045 X-RAY EXAM CHEST 1 VIEW: CPT

## 2025-06-09 PROCEDURE — 99310 SBSQ NF CARE HIGH MDM 45: CPT

## 2025-06-09 PROCEDURE — 86923 COMPATIBILITY TEST ELECTRIC: CPT

## 2025-06-09 PROCEDURE — G0328 FECAL BLOOD SCRN IMMUNOASSAY: HCPCS | Performed by: EMERGENCY MEDICINE

## 2025-06-09 PROCEDURE — 85027 COMPLETE CBC AUTOMATED: CPT

## 2025-06-09 PROCEDURE — 1124F ACP DISCUSS-NO DSCNMKR DOCD: CPT | Performed by: EMERGENCY MEDICINE

## 2025-06-09 PROCEDURE — 83735 ASSAY OF MAGNESIUM: CPT | Performed by: PHYSICIAN ASSISTANT

## 2025-06-09 PROCEDURE — 86900 BLOOD TYPING SEROLOGIC ABO: CPT | Performed by: EMERGENCY MEDICINE

## 2025-06-09 PROCEDURE — 0241U HB NFCT DS VIR RESP RNA 4 TRGT: CPT | Performed by: PHYSICIAN ASSISTANT

## 2025-06-09 PROCEDURE — 84145 PROCALCITONIN (PCT): CPT | Performed by: PHYSICIAN ASSISTANT

## 2025-06-09 PROCEDURE — 86850 RBC ANTIBODY SCREEN: CPT | Performed by: EMERGENCY MEDICINE

## 2025-06-09 PROCEDURE — 36415 COLL VENOUS BLD VENIPUNCTURE: CPT

## 2025-06-09 PROCEDURE — 85610 PROTHROMBIN TIME: CPT | Performed by: EMERGENCY MEDICINE

## 2025-06-09 PROCEDURE — 87449 NOS EACH ORGANISM AG IA: CPT | Performed by: PHYSICIAN ASSISTANT

## 2025-06-09 PROCEDURE — 85007 BL SMEAR W/DIFF WBC COUNT: CPT

## 2025-06-09 PROCEDURE — 83690 ASSAY OF LIPASE: CPT

## 2025-06-09 PROCEDURE — 86901 BLOOD TYPING SEROLOGIC RH(D): CPT | Performed by: EMERGENCY MEDICINE

## 2025-06-09 PROCEDURE — 80053 COMPREHEN METABOLIC PANEL: CPT

## 2025-06-09 PROCEDURE — 71250 CT THORAX DX C-: CPT

## 2025-06-09 PROCEDURE — 99285 EMERGENCY DEPT VISIT HI MDM: CPT | Performed by: EMERGENCY MEDICINE

## 2025-06-09 PROCEDURE — 93005 ELECTROCARDIOGRAM TRACING: CPT

## 2025-06-09 PROCEDURE — 83880 ASSAY OF NATRIURETIC PEPTIDE: CPT | Performed by: PHYSICIAN ASSISTANT

## 2025-06-09 PROCEDURE — 99222 1ST HOSP IP/OBS MODERATE 55: CPT | Performed by: INTERNAL MEDICINE

## 2025-06-09 PROCEDURE — 82948 REAGENT STRIP/BLOOD GLUCOSE: CPT

## 2025-06-09 PROCEDURE — 99284 EMERGENCY DEPT VISIT MOD MDM: CPT

## 2025-06-09 RX ORDER — GUAIFENESIN 600 MG/1
600 TABLET, EXTENDED RELEASE ORAL 2 TIMES DAILY
Status: DISCONTINUED | OUTPATIENT
Start: 2025-06-09 | End: 2025-06-12 | Stop reason: HOSPADM

## 2025-06-09 RX ORDER — GABAPENTIN 100 MG/1
100 CAPSULE ORAL 3 TIMES DAILY
Status: DISCONTINUED | OUTPATIENT
Start: 2025-06-09 | End: 2025-06-12 | Stop reason: HOSPADM

## 2025-06-09 RX ORDER — SODIUM CHLORIDE, SODIUM LACTATE, POTASSIUM CHLORIDE, CALCIUM CHLORIDE 600; 310; 30; 20 MG/100ML; MG/100ML; MG/100ML; MG/100ML
100 INJECTION, SOLUTION INTRAVENOUS CONTINUOUS
Status: DISCONTINUED | OUTPATIENT
Start: 2025-06-09 | End: 2025-06-09

## 2025-06-09 RX ORDER — BUMETANIDE 1 MG/1
1 TABLET ORAL DAILY
Status: DISCONTINUED | OUTPATIENT
Start: 2025-06-10 | End: 2025-06-12 | Stop reason: HOSPADM

## 2025-06-09 RX ORDER — INSULIN LISPRO 100 [IU]/ML
1-5 INJECTION, SOLUTION INTRAVENOUS; SUBCUTANEOUS
Status: DISCONTINUED | OUTPATIENT
Start: 2025-06-09 | End: 2025-06-12 | Stop reason: HOSPADM

## 2025-06-09 RX ORDER — LEVOTHYROXINE SODIUM 50 UG/1
50 TABLET ORAL
Status: DISCONTINUED | OUTPATIENT
Start: 2025-06-10 | End: 2025-06-12 | Stop reason: HOSPADM

## 2025-06-09 RX ORDER — FERROUS SULFATE 325(65) MG
325 TABLET ORAL EVERY OTHER DAY
Status: DISCONTINUED | OUTPATIENT
Start: 2025-06-09 | End: 2025-06-12 | Stop reason: HOSPADM

## 2025-06-09 RX ORDER — DOCUSATE SODIUM 100 MG/1
100 CAPSULE, LIQUID FILLED ORAL 2 TIMES DAILY PRN
Status: DISCONTINUED | OUTPATIENT
Start: 2025-06-09 | End: 2025-06-12 | Stop reason: HOSPADM

## 2025-06-09 RX ORDER — PHYTONADIONE 5 MG/1
2.5 TABLET ORAL ONCE
Status: COMPLETED | OUTPATIENT
Start: 2025-06-09 | End: 2025-06-09

## 2025-06-09 RX ORDER — HEPARIN SODIUM 5000 [USP'U]/ML
5000 INJECTION, SOLUTION INTRAVENOUS; SUBCUTANEOUS EVERY 8 HOURS SCHEDULED
Status: DISCONTINUED | OUTPATIENT
Start: 2025-06-09 | End: 2025-06-10

## 2025-06-09 RX ORDER — REPAGLINIDE 0.5 MG/1
0.5 TABLET ORAL
Status: DISCONTINUED | OUTPATIENT
Start: 2025-06-09 | End: 2025-06-10

## 2025-06-09 RX ORDER — ALBUTEROL SULFATE 90 UG/1
2 INHALANT RESPIRATORY (INHALATION) EVERY 4 HOURS PRN
Status: DISCONTINUED | OUTPATIENT
Start: 2025-06-09 | End: 2025-06-12 | Stop reason: HOSPADM

## 2025-06-09 RX ORDER — LIDOCAINE 50 MG/G
1 PATCH TOPICAL DAILY
Status: DISCONTINUED | OUTPATIENT
Start: 2025-06-09 | End: 2025-06-12 | Stop reason: HOSPADM

## 2025-06-09 RX ORDER — ACETAMINOPHEN 325 MG/1
650 TABLET ORAL EVERY 6 HOURS SCHEDULED
Status: DISCONTINUED | OUTPATIENT
Start: 2025-06-09 | End: 2025-06-10

## 2025-06-09 RX ORDER — METOPROLOL TARTRATE 1 MG/ML
5 INJECTION, SOLUTION INTRAVENOUS EVERY 6 HOURS PRN
Status: DISCONTINUED | OUTPATIENT
Start: 2025-06-09 | End: 2025-06-12 | Stop reason: HOSPADM

## 2025-06-09 RX ORDER — ONDANSETRON 2 MG/ML
4 INJECTION INTRAMUSCULAR; INTRAVENOUS EVERY 6 HOURS PRN
Status: DISCONTINUED | OUTPATIENT
Start: 2025-06-09 | End: 2025-06-12 | Stop reason: HOSPADM

## 2025-06-09 RX ORDER — ALLOPURINOL 100 MG/1
100 TABLET ORAL DAILY
Status: DISCONTINUED | OUTPATIENT
Start: 2025-06-10 | End: 2025-06-12 | Stop reason: HOSPADM

## 2025-06-09 RX ORDER — LIDOCAINE 4 G/G
1 PATCH TOPICAL DAILY
Status: DISCONTINUED | OUTPATIENT
Start: 2025-06-10 | End: 2025-06-09

## 2025-06-09 RX ORDER — ASCORBIC ACID 500 MG
500 TABLET ORAL 2 TIMES DAILY
Status: DISCONTINUED | OUTPATIENT
Start: 2025-06-09 | End: 2025-06-12 | Stop reason: HOSPADM

## 2025-06-09 RX ORDER — FUROSEMIDE 10 MG/ML
50 INJECTION INTRAMUSCULAR; INTRAVENOUS 2 TIMES DAILY
Status: DISCONTINUED | OUTPATIENT
Start: 2025-06-09 | End: 2025-06-10

## 2025-06-09 RX ORDER — WARFARIN SODIUM 4 MG/1
4 TABLET ORAL
Status: DISCONTINUED | OUTPATIENT
Start: 2025-06-09 | End: 2025-06-10

## 2025-06-09 RX ORDER — CALCIUM CARBONATE 500 MG/1
1000 TABLET, CHEWABLE ORAL DAILY PRN
Status: DISCONTINUED | OUTPATIENT
Start: 2025-06-09 | End: 2025-06-12 | Stop reason: HOSPADM

## 2025-06-09 RX ADMIN — HEPARIN SODIUM 5000 UNITS: 5000 INJECTION INTRAVENOUS; SUBCUTANEOUS at 21:50

## 2025-06-09 RX ADMIN — OXYCODONE HYDROCHLORIDE AND ACETAMINOPHEN 500 MG: 500 TABLET ORAL at 17:36

## 2025-06-09 RX ADMIN — SODIUM CHLORIDE, SODIUM LACTATE, POTASSIUM CHLORIDE, AND CALCIUM CHLORIDE 100 ML/HR: .6; .31; .03; .02 INJECTION, SOLUTION INTRAVENOUS at 15:16

## 2025-06-09 RX ADMIN — INSULIN LISPRO 1 UNITS: 100 INJECTION, SOLUTION INTRAVENOUS; SUBCUTANEOUS at 22:03

## 2025-06-09 RX ADMIN — FUROSEMIDE 50 MG: 10 INJECTION, SOLUTION INTRAVENOUS at 17:39

## 2025-06-09 RX ADMIN — ACETAMINOPHEN 650 MG: 325 TABLET ORAL at 17:36

## 2025-06-09 RX ADMIN — METOPROLOL TARTRATE 75 MG: 50 TABLET, FILM COATED ORAL at 21:47

## 2025-06-09 RX ADMIN — FERROUS SULFATE TAB 325 MG (65 MG ELEMENTAL FE) 325 MG: 325 (65 FE) TAB at 17:36

## 2025-06-09 RX ADMIN — HEPARIN SODIUM 5000 UNITS: 5000 INJECTION INTRAVENOUS; SUBCUTANEOUS at 17:38

## 2025-06-09 RX ADMIN — PHYTONADIONE 2.5 MG: 5 TABLET ORAL at 16:02

## 2025-06-09 RX ADMIN — CEFTRIAXONE SODIUM 1000 MG: 10 INJECTION, POWDER, FOR SOLUTION INTRAVENOUS at 17:45

## 2025-06-09 RX ADMIN — GABAPENTIN 100 MG: 100 CAPSULE ORAL at 17:36

## 2025-06-09 RX ADMIN — Medication 3 MG: at 21:47

## 2025-06-09 RX ADMIN — LIDOCAINE 1 PATCH: 50 PATCH CUTANEOUS at 17:44

## 2025-06-09 RX ADMIN — GUAIFENESIN 600 MG: 600 TABLET ORAL at 21:47

## 2025-06-09 RX ADMIN — GABAPENTIN 100 MG: 100 CAPSULE ORAL at 21:47

## 2025-06-09 RX ADMIN — PHYTONADIONE 2.5 MG: 5 TABLET ORAL at 16:03

## 2025-06-09 NOTE — ASSESSMENT & PLAN NOTE
HR controlled, 71  Denies cp/palpitations  Continue metoprolol 75 mg BID  Continue coumadin 4 mg at HS  INR 6/2/25 was 2.2, repeat 6/9/25 11.0  Plan: sending out for elevated INR and Hgb of 6.5

## 2025-06-09 NOTE — ASSESSMENT & PLAN NOTE
Recent Labs     06/09/25  1346   HGB 7.8*     Hgb at facility noted to be 6.5; Hgb on admit 7.8  Baseline Hgb mid 7's- low 8's over last 1.5 years   Stool brown; facility notes black stool but pt compliant w/ iron supplements   Previously followed by [facility] hematology for FABIENNE, possible MDS,  & anemia of CKD; has received Venofer in the past   No acute source of bleeding

## 2025-06-09 NOTE — ASSESSMENT & PLAN NOTE
Lab Results   Component Value Date    EGFR 22 06/09/2025    EGFR 26 (L) 05/23/2025    EGFR 27 (L) 05/22/2025    CREATININE 2.43 (H) 06/09/2025    CREATININE 2.26 (H) 05/23/2025    CREATININE 2.19 (H) 05/22/2025     Baseline Cr. 1.9-2.3   Cr. mildly elevated on admit at Cr. 2.43   Avoid nephrotoxins/hypotension

## 2025-06-09 NOTE — PROGRESS NOTES
[facility]  [address] [address]  [phone]  [place] [facility]  Code 31 (STR)          NAME: Dmitriy Craven  AGE: 89 y.o. SEX: male CODE STATUS: CPR    DATE OF ENCOUNTER: 6/9/2025    Assessment and Plan     1. Supratherapeutic INR  Assessment & Plan:  INR today was 11.0, HGB is 6.5  Patients INR 6/2/25 was 2.2  He had been stable on coumadin 4 mg  Will send out d/t high bleeding risk and significant anemia  Orders:  -     Transfer to other facility  2. Atrial fibrillation, unspecified type (HCC)  Assessment & Plan:  HR controlled, 71  Denies cp/palpitations  Continue metoprolol 75 mg BID  Continue coumadin 4 mg at HS  INR 6/2/25 was 2.2, repeat 6/9/25 11.0  Plan: sending out for elevated INR and Hgb of 6.5  3. Anemia, unspecified type  Assessment & Plan:  6/9/25 H/H 6.5/20.2  INR was 11.0 today  Sending out d/t high bleeding risk       All medications and routine orders were reviewed and updated as needed.    Chief Complaint     STR follow up visit  Patient's care was coordinated with nursing facility staff. Recent vitals, labs, and updated medications were review on [facility] system in facility.  Past Medical and Surgical History      Past Medical History[1]  Past Surgical History[2]  Not on File       History of Present Illness     HPI  Dmitriy Craven is a 89 year old male, he is a STR patient of [facility] since 5/24/25. Past Medical Hx including but not limited to HTN, Afib, CKD. He was seen in collaboration with nursing for medical mgmt and STR follow up.     Hospital Course  Patient was admitted to the hospital 5/19/25 from [facility] for c/o back pain. He was found to pneumonia and improved with antibiotics. He was discharged on cefpodoxime to complete course. Patient was recommended fro rehab and was discharged to [facility].    Rehab Course  Dmitriy was seen and examined at bedside today. He is a resident at [facility] and uses  wheelchair and walker for ambulation.     Abnormal labs 6/9/25, see below.  INR of 11.0, Hgb 6.5. On coumadin 4 mg daily. Last INR 6/2/25 2.2. Sending out to ER.  No reports of active bleeding. He does report feeling week, VSS.  Denies CP/SOB/N/V/D. Denies lightheadedness, dizziness, headaches, vision changes. Patient states they are eating well and staying hydrated. Denies any bowel or bladder issues. Per review of SNF records, Patient is eating 3 meals per day, consuming  %. Last documented BM 6/9/25. No concerns from nursing at this time.    The patient's allergies, past medical, surgical, social and family history were reviewed and unchanged.    Review of Systems     Review of Systems   Constitutional:  Positive for activity change and fatigue. Negative for appetite change and fever.   HENT:  Negative for congestion.    Respiratory:  Negative for cough, shortness of breath and wheezing.    Cardiovascular:  Negative for chest pain and palpitations.   Gastrointestinal:  Negative for constipation, diarrhea, nausea and vomiting.   Genitourinary:  Negative for difficulty urinating and dysuria.   Musculoskeletal:  Positive for gait problem.   Skin: Negative.    Neurological:  Positive for weakness.   Psychiatric/Behavioral:  Negative for confusion and sleep disturbance.          Objective     Vitals:   Vitals:    06/09/25 1248   BP: 142/68   Pulse: 71   Resp: 20   Temp: 97.6 °F (36.4 °C)   SpO2: 97%           Physical Exam  Vitals and nursing note reviewed.   Constitutional:       General: He is not in acute distress.     Appearance: Normal appearance. He is not ill-appearing.   HENT:      Head: Normocephalic and atraumatic.     Eyes:      Conjunctiva/sclera: Conjunctivae normal.       Cardiovascular:      Rate and Rhythm: Normal rate and regular rhythm.      Heart sounds: Normal heart sounds.   Pulmonary:      Effort: Pulmonary effort is normal. No respiratory distress.      Breath sounds: Normal breath sounds.  "No wheezing.   Abdominal:      General: There is no distension.      Tenderness: There is no abdominal tenderness.     Skin:     General: Skin is warm.     Neurological:      Mental Status: He is alert and oriented to person, place, and time.      Motor: Weakness present.      Gait: Gait abnormal.     Psychiatric:         Mood and Affect: Mood normal.         Behavior: Behavior normal.         Pertinent Laboratory/Diagnostic Studies:   Reviewed in facility chart-stable    6/9/25  CBC NO DIFF         HEMOGLOBIN 6.5  Critical Low 12.5 - 17.0 g/dL       HEMATOCRIT 20.2  L 37.0 - 48.0 %       WBC 7.4 4.0 - 10.5 thou/cmm       RBC 1.94  L 4.00 - 5.40 mill/cmm       PLATELET COUNT 278 140 - 350 thou/cmm       MPV 9.2 7.5 - 11.3 fL         H 80 - 100 fL       MCH 33.8 27.0 - 36.0 pg       MCHC 32.4 32.0 - 37.0 g/dL       RDW 18.3  H 12.0 - 16.0 %         PT WITH INR       PT 82.5  H 12.0 - 14.6 sec          Results verified      PT INR 11.0  Critical High        BASIC METABOLIC PNL         GLUCOSE 122  H 65 - 99 mg/dL         H 7 - 28 mg/dL       CREATININE 2.36  H 0.53 - 1.30 mg/dL       SODIUM 138 135 - 145 mmol/L       POTASSIUM 4.8 3.5 - 5.2 mmol/L       CHLORIDE 99  L 100 - 109 mmol/L       CARBON DIOXIDE 26 21 - 31 mmol/L       CALCIUM 8.6 8.5 - 10.5 mg/dL       ANION GAP 13  H 3 - 11        eGFRcr 25  L >59     Medications reviewed and updated see facility MAR for details.    Current Medications[3]     Please note:  Voice-recognition software may have been used in the preparation of this document.  Occasional wrong word or \"sound-alike\" substitutions may have occurred due to the inherent limitations of voice recognition software.  Interpretation should be guided by context.         HAILEY Haque  6/9/2025  12:49 PM         [1] No past medical history on file.  [2] No past surgical history on file.  [3]   Current Outpatient Medications:     acetaminophen (TYLENOL) 500 mg tablet, Take 1,000 " mg by mouth every 8 (eight) hours as needed for mild pain, Disp: , Rfl:     allopurinol (ZYLOPRIM) 100 mg tablet, Take 100 mg by mouth daily, Disp: , Rfl:     ascorbic acid (VITAMIN C) 500 MG tablet, Take 500 mg by mouth in the morning and 500 mg in the evening., Disp: , Rfl:     bumetanide (BUMEX) 1 mg tablet, Take 1 mg by mouth daily, Disp: , Rfl:     Dextromethorphan Polistirex ER (Delsym) 30 MG/5ML SUER, Take 5 mL by mouth every 12 (twelve) hours as needed (cough), Disp: , Rfl:     ferrous sulfate 325 (65 FE) MG EC tablet, Take 325 mg by mouth every other day, Disp: , Rfl:     gabapentin (NEURONTIN) 100 mg capsule, Take 100 mg by mouth 3 (three) times a day, Disp: , Rfl:     levothyroxine 50 mcg tablet, Take 50 mcg by mouth daily, Disp: , Rfl:     Lidocaine 4 % PTCH, Apply 1 patch topically in the morning. Apply to back daily, remove after 12 hours., Disp: , Rfl:     melatonin 3 mg, Take 3 mg by mouth daily at bedtime, Disp: , Rfl:     menthol-cetylpyridinium (CEPACOL) 3 MG lozenge, Take 1 lozenge by mouth as needed for sore throat, Disp: , Rfl:     methocarbamol (ROBAXIN) 500 mg tablet, Take 500 mg by mouth every 8 (eight) hours as needed for muscle spasms, Disp: , Rfl:     metoprolol tartrate (LOPRESSOR) 50 mg tablet, Take 75 mg by mouth every 12 (twelve) hours, Disp: , Rfl:     Multiple Vitamins-Minerals (multivitamin with minerals) tablet, Take 1 tablet by mouth daily, Disp: , Rfl:     repaglinide (PRANDIN) 0.5 mg tablet, Take 0.5 mg by mouth 3 (three) times a day before meals, Disp: , Rfl:     simethicone (MYLICON) 80 mg chewable tablet, Chew 80 mg every 6 (six) hours as needed for flatulence, Disp: , Rfl:     sitaGLIPtin (JANUVIA) 25 mg tablet, Take 25 mg by mouth daily, Disp: , Rfl:     warfarin (COUMADIN) 4 mg tablet, Take 4 mg by mouth daily at bedtime, Disp: , Rfl:

## 2025-06-09 NOTE — ASSESSMENT & PLAN NOTE
Wt Readings from Last 3 Encounters:   06/09/25 59.1 kg (130 lb 4.7 oz)     CXR w/ small B/L pleural effusion   BNP pending   Clinically, rales on auscultation with 1+ lower extremity edema; patient received fluids in ED  Maintained on Bumex 1 mg q day   ECHO in 2023 w/ preserved EF  Obtain update ECHO   Start Lasix 50 mg IV BID   C/w BB, Jansincere   I&O's, daily wts, low salt diet   Stable on room air

## 2025-06-09 NOTE — ASSESSMENT & PLAN NOTE
Recent Labs     06/09/25  1346   INR 11.83*     INR on admit 11.83; prior INR 2.2 on 06/02  PTA Coumadin 4 mg q day   Given 5 mg of Vit K in ED   Trend INR

## 2025-06-09 NOTE — H&P
H&P - Hospitalist   Name: Dmitriy Craven 89 y.o. male I MRN: [id number]  Unit/Bed#: ED-18 I Date of Admission: 6/9/2025   Date of Service: 6/9/2025 I Hospital Day: 0     Assessment & Plan  Supratherapeutic INR  Recent Labs     06/09/25  1346   INR 11.83*     INR on admit 11.83; prior INR 2.2 on 06/02  PTA Coumadin 4 mg q day   Given 5 mg of Vit K in ED   Trend INR   Anemia  Recent Labs     06/09/25  1346   HGB 7.8*     Hgb at facility noted to be 6.5; Hgb on admit 7.8  Baseline Hgb mid 7's- low 8's over last 1.5 years   Stool brown; facility notes black stool but pt compliant w/ iron supplements   Previously followed by [facility] hematology for FABIENNE, possible MDS,  & anemia of CKD; has received Venofer in the past   No acute source of bleeding   Pneumonia  Persistent cough/ gurgling; recent dx w/ PNA in late 05/2024, admitted to [facility]   CXR w/ cardiac consolidation concerning for pneumonia   Pro-Natalio 1.42, trend   Obtain CT chest   Initiate Rocephin & azithromycin, day #1   Mucinex, nebulizers, supportive care  Stable on room air  Acute on chronic heart failure with preserved ejection fraction (HCC)  Wt Readings from Last 3 Encounters:   06/09/25 59.1 kg (130 lb 4.7 oz)     CXR w/ small B/L pleural effusion   BNP pending   Clinically, rales on auscultation with 1+ lower extremity edema; patient received fluids in ED  Maintained on Bumex 1 mg q day   ECHO in 2023 w/ preserved EF  Obtain update ECHO   Start Lasix 50 mg IV BID   C/w BB, Januvia   I&O's, daily wts, low salt diet   Stable on room air  RSV (acute bronchiolitis due to respiratory syncytial virus)  Positive for RSV  Supportive care  Stable on room air  Acute-on-chronic kidney injury  (HCC)  Lab Results   Component Value Date    EGFR 22 06/09/2025    EGFR 26 (L) 05/23/2025    EGFR 27 (L) 05/22/2025    CREATININE 2.43 (H) 06/09/2025    CREATININE 2.26 (H) 05/23/2025    CREATININE 2.19 (H) 05/22/2025     Baseline Cr. 1.9-2.3   Cr. mildly elevated on admit at Cr. 2.43    Avoid nephrotoxins/hypotension  Atrial fibrillation (HCC)  Rate controlled  C/w metoprolol 75 mg BID  PTA Coumadin 4 mg q day on hold   Hypothyroidism  TSH therapeutic in 05/2024  C/w levothyroxine 50 mcg q day       VTE Pharmacologic Prophylaxis:   High Risk (Score >/= 5) - Pharmacological DVT Prophylaxis Ordered: heparin. Sequential Compression Devices Ordered.  Code Status: DNR/DNI  Discussion with family: Updated  (son) at bedside.    Anticipated Length of Stay: Patient will be admitted on an inpatient basis with an anticipated length of stay of greater than 2 midnights secondary to pna, mild hypovolemia.    History of Present Illness   Chief Complaint: elevated Vit K     Dmitriy Craven is a 89 y.o. male with a PMH of hypothyroidism, A. Fib chronic anemia, who presents with elevated Vit K.  Patient was sent from acute rehab by provider for anemia and elevated vitamin K.  Vitamin K recently checked 1 week ago and was in appropriate therapeutic range.  Hemoglobin PTA noted to be 6.5.  Patient does have a chronic history of anemia and is on iron supplements consistently.  Patient recently hospitalized in late 05/2025 at [facility] for pneumonia.  Patient completed his cephalosporin course as an outpatient.  Patient is currently having no acute source of bleeding.  Patient denies hematochezia, melena, bleeding from gums, hematemesis or hemoptysis.  Patient's only complaint is a persistent productive cough with some mild shortness of breath.  Patient notes some mild orthopnea.  Patient notes his respiratory status is not quite recovered since treatment for pneumonia at [facility].     Review of Systems   Constitutional:  Positive for fatigue. Negative for activity change, appetite change and fever.   HENT:  Negative for congestion.    Respiratory:  Positive for cough and shortness of breath.    Cardiovascular:  Negative for chest pain, palpitations and leg swelling.   Gastrointestinal:  Negative for abdominal  distention, abdominal pain, anal bleeding, blood in stool, constipation, diarrhea, nausea and vomiting.   Genitourinary:  Negative for difficulty urinating and dysuria.   Musculoskeletal:  Negative for arthralgias and back pain.   Neurological:  Negative for dizziness, light-headedness and headaches.       Historical Information   Past Medical History[1]  Past Surgical History[2]  Social History[3]  E-Cigarette/Vaping     E-Cigarette/Vaping Substances     Family history non-contributory  Social History:  Marital Status:    Occupation: Retired  Patient Pre-hospital Living Situation: From Rehab   Patient Pre-hospital Level of Mobility: walks with walker  Patient Pre-hospital Diet Restrictions: None    Meds/Allergies   I have reveiwed home medications using records provided by [facility].  Prior to Admission medications    Medication Sig Start Date End Date Taking? Authorizing Provider   acetaminophen (TYLENOL) 500 mg tablet Take 1,000 mg by mouth every 8 (eight) hours as needed for mild pain    Historical Provider, MD   allopurinol (ZYLOPRIM) 100 mg tablet Take 100 mg by mouth daily    Historical Provider, MD   ascorbic acid (VITAMIN C) 500 MG tablet Take 500 mg by mouth in the morning and 500 mg in the evening.    Historical Provider, MD   bumetanide (BUMEX) 1 mg tablet Take 1 mg by mouth daily    Historical Provider, MD   Dextromethorphan Polistirex ER (Delsym) 30 MG/5ML SUER Take 5 mL by mouth every 12 (twelve) hours as needed (cough)    Historical Provider, MD   ferrous sulfate 325 (65 FE) MG EC tablet Take 325 mg by mouth every other day    Historical Provider, MD   gabapentin (NEURONTIN) 100 mg capsule Take 100 mg by mouth 3 (three) times a day    Historical Provider, MD   levothyroxine 50 mcg tablet Take 50 mcg by mouth daily    Historical Provider, MD   Lidocaine 4 % PTCH Apply 1 patch topically in the morning. Apply to back daily, remove after 12 hours.    Historical Provider, MD   melatonin 3 mg Take 3 mg by  mouth daily at bedtime    Historical Provider, MD   menthol-cetylpyridinium (CEPACOL) 3 MG lozenge Take 1 lozenge by mouth as needed for sore throat    Historical Provider, MD   methocarbamol (ROBAXIN) 500 mg tablet Take 500 mg by mouth every 8 (eight) hours as needed for muscle spasms    Historical Provider, MD   metoprolol tartrate (LOPRESSOR) 50 mg tablet Take 75 mg by mouth every 12 (twelve) hours    Historical Provider, MD   Multiple Vitamins-Minerals (multivitamin with minerals) tablet Take 1 tablet by mouth daily    Historical Provider, MD   repaglinide (PRANDIN) 0.5 mg tablet Take 0.5 mg by mouth 3 (three) times a day before meals    Historical Provider, MD   simethicone (MYLICON) 80 mg chewable tablet Chew 80 mg every 6 (six) hours as needed for flatulence    Historical Provider, MD   sitaGLIPtin (JANUVIA) 25 mg tablet Take 25 mg by mouth daily    Historical Provider, MD   warfarin (COUMADIN) 4 mg tablet Take 4 mg by mouth daily at bedtime    Historical Provider, MD     No Known Allergies    Objective :  Temp:  [97.6 °F (36.4 °C)-97.7 °F (36.5 °C)] 97.7 °F (36.5 °C)  HR:  [71-99] 99  BP: (110-144)/(66-77) 144/77  Resp:  [18-20] 18  SpO2:  [95 %-97 %] 95 %  O2 Device: None (Room air)    Physical Exam  Constitutional:       General: He is not in acute distress.     Appearance: He is normal weight. He is not toxic-appearing.      Comments: Frail, cachectic, temporal wasting, thinning orbital fat pad   HENT:      Head: Normocephalic.      Right Ear: External ear normal.      Left Ear: External ear normal.      Nose: Nose normal.      Mouth/Throat:      Mouth: Mucous membranes are moist.      Pharynx: Oropharynx is clear.     Eyes:      Extraocular Movements: Extraocular movements intact.      Conjunctiva/sclera: Conjunctivae normal.       Cardiovascular:      Rate and Rhythm: Normal rate and regular rhythm.      Pulses: Normal pulses.      Heart sounds: Normal heart sounds.   Pulmonary:      Effort: Pulmonary  effort is normal. No respiratory distress.      Breath sounds: Wheezing (Scant expiratory) and rales (Scant rales at baseline) present.   Abdominal:      General: Abdomen is flat. There is no distension.      Palpations: Abdomen is soft.      Tenderness: There is no abdominal tenderness. There is no guarding or rebound.     Musculoskeletal:         General: Swelling (Trace LE edema) present.     Skin:     General: Skin is warm and dry.      Coloration: Skin is not jaundiced.      Findings: No bruising or lesion.     Neurological:      General: No focal deficit present.      Mental Status: He is alert and oriented to person, place, and time. Mental status is at baseline.     Psychiatric:         Mood and Affect: Mood normal.         Behavior: Behavior normal.            Lab Results: I have reviewed the following results:  Results from last 7 days   Lab Units 06/09/25  1346   WBC Thousand/uL 9.14   HEMOGLOBIN g/dL 7.8*   HEMATOCRIT % 23.8*   PLATELETS Thousands/uL 307   BANDS PCT % 2   LYMPHO PCT % 20   MONO PCT % 8   EOS PCT % 2     Results from last 7 days   Lab Units 06/09/25  1346   SODIUM mmol/L 135   POTASSIUM mmol/L 4.5   CHLORIDE mmol/L 99   CO2 mmol/L 27   BUN mg/dL 125*   CREATININE mg/dL 2.43*   ANION GAP mmol/L 9   CALCIUM mg/dL 9.3   ALBUMIN g/dL 3.6   TOTAL BILIRUBIN mg/dL 0.57   ALK PHOS U/L 140*   ALT U/L 34   AST U/L 31   GLUCOSE RANDOM mg/dL 113     Results from last 7 days   Lab Units 06/09/25  1346   INR  11.83*         Lab Results   Component Value Date    HGBA1C 6.2 (H) 05/19/2025    HGBA1C 6.3 (H) 01/08/2025    HGBA1C 7.2 (H) 03/28/2024           Imaging Results Review: I reviewed radiology reports from this admission including: chest xray.  Other Study Results Review: EKG was reviewed.     Administrative Statements   I have spent a total time of 65 minutes in caring for this patient on the day of the visit/encounter including Diagnostic results, Prognosis, Risks and benefits of tx options,  Instructions for management, Patient and family education, Impressions, Counseling / Coordination of care, Documenting in the medical record, Reviewing/placing orders in the medical record (including tests, medications, and/or procedures), Obtaining or reviewing history  , and Communicating with other healthcare professionals .    ** Please Note: This note has been constructed using a voice recognition system. **         [1]   Past Medical History:  Diagnosis Date    Anemia     CHF (congestive heart failure) (HCC)    [2] No past surgical history on file.  [3]   Social History  Tobacco Use    Smoking status: Former     Types: Cigarettes    Smokeless tobacco: Never   Substance and Sexual Activity    Drug use: Never

## 2025-06-09 NOTE — ED PROCEDURE NOTE
PROCEDURE  ECG 12 Lead Documentation Only    Date/Time: 6/9/2025 3:25 PM    Performed by: Chris Fitzgerald MD  Authorized by: Chris Fitzgerald MD    Indications / Diagnosis:  Admit  ECG reviewed by me, the ED Provider: yes    Patient location:  ED  Previous ECG:     Previous ECG:  Unavailable    Comparison to cardiac monitor: Yes    Interpretation:     Interpretation: non-specific    Rate:     ECG rate:  74    ECG rate assessment: normal    Rhythm:     Rhythm: atrial fibrillation    Ectopy:     Ectopy: none    QRS:     QRS axis:  Left    QRS intervals:  Normal  Conduction:     Conduction: abnormal      Abnormal conduction: incomplete RBBB    ST segments:     ST segments:  Normal  T waves:     T waves: flattening      Flattening:  I, aVL, V1 and V2  Comments:      No ecg signs of ischemia/ injury        Chris Fitzgerald MD  06/09/25 152

## 2025-06-09 NOTE — ASSESSMENT & PLAN NOTE
Persistent cough/ gurgling; recent dx w/ PNA in late 05/2024, [facility]   CXR w/ cardiac consolidation concerning for pneumonia   Pro-Natalio 1.42, trend   Obtain CT chest   Initiate Rocephin & azithromycin, day #1   Mucinex, nebulizers, supportive care  Stable on room air

## 2025-06-09 NOTE — ASSESSMENT & PLAN NOTE
INR today was 11.0, HGB is 6.5  Patients INR 6/2/25 was 2.2  He had been stable on coumadin 4 mg  Will send out d/t high bleeding risk and significant anemia

## 2025-06-09 NOTE — ED PROVIDER NOTES
Time reflects when diagnosis was documented in both MDM as applicable and the Disposition within this note       Time User Action Codes Description Comment    6/9/2025  2:41 PM Chris Fitzgerald Add [N17.9,  N18.9] Acute-on-chronic kidney injury  (HCC)     6/9/2025  3:28 PM Chris Fitzgerald Add [N17.9,  N18.4] Acute renal failure superimposed on stage 4 chronic kidney disease, unspecified acute renal failure type (HCC)     6/9/2025  3:28 PM Chris Fitzgerald Add [D64.9] Chronic anemia     6/9/2025  3:28 PM Chris Fitzgerald Add [D68.9] Coagulopathy (HCC)           ED Disposition       ED Disposition   Admit    Condition   Stable    Date/Time   Mon Jun 9, 2025  3:29 PM    Comment   Case was discussed with dr montalvo  and the patient's admission status was agreed to be Admission Status: observation status to the service of Dr. Mendez  .               Assessment & Plan       Medical Decision Making  PT WITH HX OF ANEMIA- CKD--  NO ACTIV E BLEEDING-- S ON COUMADIN SENT IN FRO LOW HB- ELEVATED INR-- NO SEPTIC COMPS- PT WITH WORSENING OF CKD--  PT WILL KELLY TO BE ADMITTED AND WITH NO ACTIVE BLEEDING WILL GIVE ORAL VIT K     Problems Addressed:  Acute renal failure superimposed on stage 4 chronic kidney disease, unspecified acute renal failure type (HCC): acute illness or injury     Details: SEE MAGNOLIAT DN ABOVE   Acute-on-chronic kidney injury  (HCC): acute illness or injury     Details: SEE CHART AND ABOVE   Chronic anemia: chronic illness or injury     Details: SEE CHART AND ABOVE   Coagulopathy (HCC): acute illness or injury     Details: SEE CHART AND ABOVE     Amount and/or Complexity of Data Reviewed  External Data Reviewed: labs, radiology, ECG and notes.     Details: ALL REVIEWED   Labs: ordered. Decision-making details documented in ED Course.     Details: ALL REVIEWED   Radiology: ordered and independent interpretation performed. Decision-making details documented in ED Course.     Details: ALL REVIEWED  - PT WITH DRY COUGH NOTED DURING EXAM-   ECG/medicine tests: ordered and independent interpretation performed. Decision-making details documented in ED Course.     Details: ALL REVIEWED   Discussion of management or test interpretation with external provider(s): MODERATE AMOUNT OF ER MD THOUGHT COMPLEXITY  AND WORKUP    Risk  Prescription drug management.  Decision regarding hospitalization.  Decision not to resuscitate or to de-escalate care because of poor prognosis.        ED Course as of 06/10/25 1814   Mon Jun 09, 2025   1439 - ER MD NOTE- ANAL-RECTAL EXAM- NORMAL EXTERNAL ANAL EXAM- BROWN SOFT STOOL IN RECTAL VAULT- SENT FOR FECAL BLOOD TEST    1440 ER MD NOT-E INITIAL LAB WORKUP WAS FIRST NURSED    1458 Er md note-  blood consent signed by pt and er md- on chart    1531 Er md note- pt did state that he took his am meds today    1621 Cxr portable- no old cxr for comparison -- left base pleural effusion - no overt infiltrate-   no free/sq air-   1622 Er md note- 4/15/23 cardiac echo report reviewed by er md        Medications   lactated ringers infusion (100 mL/hr Intravenous New Bag 6/9/25 1516)   phytonadione (MEPHYTON) tablet 2.5 mg (has no administration in time range)       ED Risk Strat Scores                    No data recorded                            History of Present Illness       Chief Complaint   Patient presents with    Abnormal Lab     Pt arrives via EMS from [facility]. Staying there currently for PT. Was sent today for INR of 11 and Hgb of 6.5. No complaints besides pain in his legs that he is doing therapy for.       Past Medical History[1]   Past Surgical History[2]   Family History[3]   Social History[4]   E-Cigarette/Vaping      E-Cigarette/Vaping Substances      I have reviewed and agree with the history as documented.     - 89 yr male from local long term care facility - hx of anemia/ ckd- chronic afib on coumadin -- no real complaints- other than chronic bilateral hip pain  - seen by geriatric np today - had labs- thsi am which showed hb of 6.5- baseline is 7-8- inr of greater than 11 and   elevated bun/cr over baseline-- pt denies any blood/black tarry stools is on thelma iron - no abd pain - no fall /injury - no bleeding   from anywhere at present       History provided by:  Patient and nursing home   used: No        Review of Systems   Constitutional: Negative.    HENT: Negative.     Eyes: Negative.    Respiratory: Negative.     Cardiovascular: Negative.    Gastrointestinal: Negative.    Endocrine: Negative.    Genitourinary: Negative.    Musculoskeletal: Negative.    Skin: Negative.    Allergic/Immunologic: Negative.    Neurological: Negative.    Hematological: Negative.    Psychiatric/Behavioral: Negative.             Objective       ED Triage Vitals   Temperature Pulse Blood Pressure Respirations SpO2 Patient Position - Orthostatic VS   06/09/25 1340 06/09/25 1340 06/09/25 1342 06/09/25 1340 06/09/25 1340 --   97.7 °F (36.5 °C) 76 110/66 18 97 %       Temp Source Heart Rate Source BP Location FiO2 (%) Pain Score    06/09/25 1340 06/09/25 1340 -- -- --    Oral Monitor         Vitals      Date and Time Temp Pulse SpO2 Resp BP Pain Score FACES Pain Rating User   06/09/25 1445 -- 99 95 % 18 144/77 -- -- LA   06/09/25 1342 -- -- -- -- 110/66 -- -- [place]   06/09/25 1340 97.7 °F (36.5 °C) 76 97 % 18 -- -- -- LA            Physical Exam  Vitals and nursing note reviewed.   Constitutional:       General: He is not in acute distress.     Appearance: Normal appearance. He is not ill-appearing, toxic-appearing or diaphoretic.      Comments: Avss- pulse ox  95 % on ra- interpretation is normal- no intervention - in nad    HENT:      Head: Normocephalic and atraumatic.      Nose: Nose normal.      Mouth/Throat:      Mouth: Mucous membranes are moist.     Eyes:      General: No scleral icterus.        Right eye: No discharge.         Left eye: No discharge.      Extraocular  Movements: Extraocular movements intact.      Pupils: Pupils are equal, round, and reactive to light.      Comments: Pale conj    Neck:      Vascular: No carotid bruit.     Cardiovascular:      Rate and Rhythm: Rhythm irregular.      Pulses: Normal pulses.      Heart sounds: Normal heart sounds. No murmur heard.     No friction rub. No gallop.   Pulmonary:      Effort: Pulmonary effort is normal. No respiratory distress.      Breath sounds: No stridor. No wheezing, rhonchi or rales.      Comments: Midl decreased bs at bases bilaterally   Chest:      Chest wall: No tenderness.   Abdominal:      General: There is no distension.      Palpations: Abdomen is soft. There is no mass.      Tenderness: There is no abdominal tenderness. There is no right CVA tenderness, left CVA tenderness, guarding or rebound.      Hernia: No hernia is present.      Comments: Soft nt/nd- no hsm - no cva tenderness- no ascites - no peritoneal signs- no puslsatile abd mass/bruit/ tenderness      Musculoskeletal:         General: No swelling, tenderness, deformity or signs of injury. Normal range of motion.      Cervical back: Normal range of motion and neck supple. No rigidity or tenderness.      Right lower leg: No edema.      Left lower leg: No edema.   Lymphadenopathy:      Cervical: No cervical adenopathy.     Skin:     Capillary Refill: Capillary refill takes less than 2 seconds.      Coloration: Skin is pale. Skin is not jaundiced.      Findings: No bruising, erythema, lesion or rash.     Neurological:      General: No focal deficit present.      Mental Status: He is alert and oriented to person, place, and time. Mental status is at baseline.      Cranial Nerves: No cranial nerve deficit.      Sensory: No sensory deficit.      Motor: No weakness.      Comments: Normal non focal neuro exam- normal cn exam- normal bue/ble strength/sensation    Psychiatric:         Mood and Affect: Mood normal.         Behavior: Behavior normal.          Thought Content: Thought content normal.         Judgment: Judgment normal.         Results Reviewed       Procedure Component Value Units Date/Time    iFOBT (FIT) [[id number]] Collected: 06/09/25 1518    Lab Status: In process Specimen: Stool from Per Rectum Updated: 06/09/25 1525    Protime-INR [[id number]]  (Abnormal) Collected: 06/09/25 1346    Lab Status: Final result Specimen: Blood from Arm, Left Updated: 06/09/25 1512     Protime 89.1 seconds      INR 11.83    Narrative:      INR Therapeutic Range    Indication                                             INR Range      Atrial Fibrillation                                               2.0-3.0  Hypercoagulable State                                    2.0.2.3  Left Ventricular Asist Device                            2.0-3.0  Mechanical Heart Valve                                  -    Aortic(with afib, MI, embolism, HF, LA enlargement,    and/or coagulopathy)                                     2.0-3.0 (2.5-3.5)     Mitral                                                             2.5-3.5  Prosthetic/Bioprosthetic Heart Valve               2.0-3.0  Venous thromboembolism (VTE: VT, PE        2.0-3.0    Verified by repeat    RBC Morphology Reflex Test [[id number]] Collected: 06/09/25 1346    Lab Status: Final result Specimen: Blood from Arm, Left Updated: 06/09/25 1501    CBC and differential [[id number]]  (Abnormal) Collected: 06/09/25 1346    Lab Status: Final result Specimen: Blood from Arm, Left Updated: 06/09/25 1420     WBC 9.14 Thousand/uL      RBC 2.25 Million/uL      Hemoglobin 7.8 g/dL      Hematocrit 23.8 %       fL      MCH 34.7 pg      MCHC 32.8 g/dL      RDW 17.5 %      MPV 10.5 fL      Platelets 307 Thousands/uL     Manual Differential(PHLEBS Do Not Order) [[id number]] Collected: 06/09/25 1346    Lab Status: Final result Specimen: Blood from Arm, Left Updated: 06/09/25 1420     Segmented % 68 %      Bands % 2 %      Lymphocytes % 20 %       Monocytes % 8 %      Eosinophils % 2 %      Basophils % 0 %      Absolute Neutrophils 6.40 Thousand/uL      Absolute Lymphocytes 1.83 Thousand/uL      Absolute Monocytes 0.73 Thousand/uL      Absolute Eosinophils 0.18 Thousand/uL      Absolute Basophils 0.00 Thousand/uL      Total Counted --     RBC Morphology Present     Platelet Estimate Adequate     Anisocytosis Present     Ovalocytes Present     Polychromasia Present    Comprehensive metabolic panel [[id number]]  (Abnormal) Collected: 06/09/25 1346    Lab Status: Final result Specimen: Blood from Arm, Left Updated: 06/09/25 1410     Sodium 135 mmol/L      Potassium 4.5 mmol/L      Chloride 99 mmol/L      CO2 27 mmol/L      ANION GAP 9 mmol/L       mg/dL      Creatinine 2.43 mg/dL      Glucose 113 mg/dL      Calcium 9.3 mg/dL      AST 31 U/L      ALT 34 U/L      Alkaline Phosphatase 140 U/L      Total Protein 7.4 g/dL      Albumin 3.6 g/dL      Total Bilirubin 0.57 mg/dL      eGFR 22 ml/min/1.73sq m     Narrative:      National Kidney Disease Foundation guidelines for Chronic Kidney Disease (CKD):     Stage 1 with normal or high GFR (GFR > 90 mL/min/1.73 square meters)    Stage 2 Mild CKD (GFR = 60-89 mL/min/1.73 square meters)    Stage 3A Moderate CKD (GFR = 45-59 mL/min/1.73 square meters)    Stage 3B Moderate CKD (GFR = 30-44 mL/min/1.73 square meters)    Stage 4 Severe CKD (GFR = 15-29 mL/min/1.73 square meters)    Stage 5 End Stage CKD (GFR <15 mL/min/1.73 square meters)  Note: GFR calculation is accurate only with a steady state creatinine    Lipase [[id number]]  (Normal) Collected: 06/09/25 1346    Lab Status: Final result Specimen: Blood from Arm, Left Updated: 06/09/25 1410     Lipase 35 u/L             XR chest 1 view portable    (Results Pending)       Procedures    ED Medication and Procedure Management   Prior to Admission Medications   Prescriptions Last Dose Informant Patient Reported? Taking?   Dextromethorphan Polistirex ER (Delsym) 30  MG/5ML SUER   Yes No   Sig: Take 5 mL by mouth every 12 (twelve) hours as needed (cough)   Lidocaine 4 % PTCH   Yes No   Sig: Apply 1 patch topically in the morning. Apply to back daily, remove after 12 hours.   Multiple Vitamins-Minerals (multivitamin with minerals) tablet   Yes No   Sig: Take 1 tablet by mouth daily   acetaminophen (TYLENOL) 500 mg tablet   Yes No   Sig: Take 1,000 mg by mouth every 8 (eight) hours as needed for mild pain   allopurinol (ZYLOPRIM) 100 mg tablet   Yes No   Sig: Take 100 mg by mouth daily   ascorbic acid (VITAMIN C) 500 MG tablet   Yes No   Sig: Take 500 mg by mouth in the morning and 500 mg in the evening.   bumetanide (BUMEX) 1 mg tablet   Yes No   Sig: Take 1 mg by mouth daily   ferrous sulfate 325 (65 FE) MG EC tablet   Yes No   Sig: Take 325 mg by mouth every other day   gabapentin (NEURONTIN) 100 mg capsule   Yes No   Sig: Take 100 mg by mouth 3 (three) times a day   levothyroxine 50 mcg tablet   Yes No   Sig: Take 50 mcg by mouth daily   melatonin 3 mg   Yes No   Sig: Take 3 mg by mouth daily at bedtime   menthol-cetylpyridinium (CEPACOL) 3 MG lozenge   Yes No   Sig: Take 1 lozenge by mouth as needed for sore throat   methocarbamol (ROBAXIN) 500 mg tablet   Yes No   Sig: Take 500 mg by mouth every 8 (eight) hours as needed for muscle spasms   metoprolol tartrate (LOPRESSOR) 50 mg tablet   Yes No   Sig: Take 75 mg by mouth every 12 (twelve) hours   repaglinide (PRANDIN) 0.5 mg tablet   Yes No   Sig: Take 0.5 mg by mouth 3 (three) times a day before meals   simethicone (MYLICON) 80 mg chewable tablet   Yes No   Sig: Chew 80 mg every 6 (six) hours as needed for flatulence   sitaGLIPtin (JANUVIA) 25 mg tablet   Yes No   Sig: Take 25 mg by mouth daily   warfarin (COUMADIN) 4 mg tablet   Yes No   Sig: Take 4 mg by mouth daily at bedtime      Facility-Administered Medications: None     Patient's Medications   Discharge Prescriptions    No medications on file     No discharge  procedures on file.  ED SEPSIS DOCUMENTATION   Time reflects when diagnosis was documented in both MDM as applicable and the Disposition within this note       Time User Action Codes Description Comment    6/9/2025  2:41 PM Chris Fitzgerald [N17.9,  N18.9] Acute-on-chronic kidney injury  (HCC)     6/9/2025  3:28 PM hCris Fitzgerald [N17.9,  N18.4] Acute renal failure superimposed on stage 4 chronic kidney disease, unspecified acute renal failure type (HCC)     6/9/2025  3:28 PM Chris Fitzgerald [D64.9] Chronic anemia     6/9/2025  3:28 PM Chris Fitzgerald [D68.9] Coagulopathy (HCC)                    [1]   Past Medical History:  Diagnosis Date    Anemia     CHF (congestive heart failure) (HCC)    [2] No past surgical history on file.  [3] No family history on file.  [4]   Social History  Tobacco Use    Smoking status: Former     Types: Cigarettes    Smokeless tobacco: Never   Substance Use Topics    Drug use: Never        Chris Fitzgerald MD  06/10/25 8250

## 2025-06-10 ENCOUNTER — APPOINTMENT (INPATIENT)
Dept: NON INVASIVE DIAGNOSTICS | Facility: HOSPITAL | Age: OVER 89
End: 2025-06-10
Payer: COMMERCIAL

## 2025-06-10 PROBLEM — I50.32 CHRONIC DIASTOLIC HEART FAILURE (HCC): Status: ACTIVE | Noted: 2025-06-09

## 2025-06-10 PROBLEM — N18.4 CKD (CHRONIC KIDNEY DISEASE) STAGE 4, GFR 15-29 ML/MIN (HCC): Status: ACTIVE | Noted: 2025-05-27

## 2025-06-10 PROBLEM — I50.9 CHRONIC HEART FAILURE (HCC): Status: ACTIVE | Noted: 2025-06-09

## 2025-06-10 LAB
ANION GAP SERPL CALCULATED.3IONS-SCNC: 8 MMOL/L (ref 4–13)
ATRIAL RATE: 75 BPM
BUN SERPL-MCNC: 123 MG/DL (ref 5–25)
CALCIUM SERPL-MCNC: 8.7 MG/DL (ref 8.4–10.2)
CHLORIDE SERPL-SCNC: 101 MMOL/L (ref 96–108)
CO2 SERPL-SCNC: 29 MMOL/L (ref 21–32)
CREAT SERPL-MCNC: 2.34 MG/DL (ref 0.6–1.3)
ERYTHROCYTE [DISTWIDTH] IN BLOOD BY AUTOMATED COUNT: 17.3 % (ref 11.6–15.1)
GFR SERPL CREATININE-BSD FRML MDRD: 23 ML/MIN/1.73SQ M
GLUCOSE SERPL-MCNC: 144 MG/DL (ref 65–140)
GLUCOSE SERPL-MCNC: 182 MG/DL (ref 65–140)
GLUCOSE SERPL-MCNC: 223 MG/DL (ref 65–140)
GLUCOSE SERPL-MCNC: 297 MG/DL (ref 65–140)
GLUCOSE SERPL-MCNC: 72 MG/DL (ref 65–140)
GLUCOSE SERPL-MCNC: 87 MG/DL (ref 65–140)
HCT VFR BLD AUTO: 21.4 % (ref 36.5–49.3)
HCT VFR BLD AUTO: 27.2 % (ref 36.5–49.3)
HGB BLD-MCNC: 6.8 G/DL (ref 12–17)
HGB BLD-MCNC: 8.7 G/DL (ref 12–17)
INR PPP: 3.56 (ref 0.85–1.19)
MAGNESIUM SERPL-MCNC: 2.4 MG/DL (ref 1.9–2.7)
MCH RBC QN AUTO: 33.8 PG (ref 26.8–34.3)
MCHC RBC AUTO-ENTMCNC: 31.8 G/DL (ref 31.4–37.4)
MCV RBC AUTO: 107 FL (ref 82–98)
PLATELET # BLD AUTO: 298 THOUSANDS/UL (ref 149–390)
PMV BLD AUTO: 10.8 FL (ref 8.9–12.7)
POTASSIUM SERPL-SCNC: 4 MMOL/L (ref 3.5–5.3)
PROCALCITONIN SERPL-MCNC: 1.09 NG/ML
PROTHROMBIN TIME: 36.1 SECONDS (ref 12.3–15)
QRS AXIS: -73 DEGREES
QRSD INTERVAL: 86 MS
QT INTERVAL: 428 MS
QTC INTERVAL: 475 MS
RBC # BLD AUTO: 2.01 MILLION/UL (ref 3.88–5.62)
SODIUM SERPL-SCNC: 138 MMOL/L (ref 135–147)
T WAVE AXIS: 262 DEGREES
VENTRICULAR RATE: 74 BPM
WBC # BLD AUTO: 7.58 THOUSAND/UL (ref 4.31–10.16)

## 2025-06-10 PROCEDURE — 93306 TTE W/DOPPLER COMPLETE: CPT | Performed by: INTERNAL MEDICINE

## 2025-06-10 PROCEDURE — 94668 MNPJ CHEST WALL SBSQ: CPT

## 2025-06-10 PROCEDURE — 85610 PROTHROMBIN TIME: CPT | Performed by: PHYSICIAN ASSISTANT

## 2025-06-10 PROCEDURE — 85018 HEMOGLOBIN: CPT | Performed by: PHYSICIAN ASSISTANT

## 2025-06-10 PROCEDURE — 92610 EVALUATE SWALLOWING FUNCTION: CPT

## 2025-06-10 PROCEDURE — P9016 RBC LEUKOCYTES REDUCED: HCPCS

## 2025-06-10 PROCEDURE — 93010 ELECTROCARDIOGRAM REPORT: CPT | Performed by: INTERNAL MEDICINE

## 2025-06-10 PROCEDURE — 83735 ASSAY OF MAGNESIUM: CPT | Performed by: PHYSICIAN ASSISTANT

## 2025-06-10 PROCEDURE — 36415 COLL VENOUS BLD VENIPUNCTURE: CPT | Performed by: PHYSICIAN ASSISTANT

## 2025-06-10 PROCEDURE — 99232 SBSQ HOSP IP/OBS MODERATE 35: CPT | Performed by: PHYSICIAN ASSISTANT

## 2025-06-10 PROCEDURE — 93306 TTE W/DOPPLER COMPLETE: CPT

## 2025-06-10 PROCEDURE — 84145 PROCALCITONIN (PCT): CPT | Performed by: PHYSICIAN ASSISTANT

## 2025-06-10 PROCEDURE — 85014 HEMATOCRIT: CPT | Performed by: PHYSICIAN ASSISTANT

## 2025-06-10 PROCEDURE — 85027 COMPLETE CBC AUTOMATED: CPT | Performed by: PHYSICIAN ASSISTANT

## 2025-06-10 PROCEDURE — 82948 REAGENT STRIP/BLOOD GLUCOSE: CPT

## 2025-06-10 PROCEDURE — 94664 DEMO&/EVAL PT USE INHALER: CPT

## 2025-06-10 PROCEDURE — 80048 BASIC METABOLIC PNL TOTAL CA: CPT | Performed by: PHYSICIAN ASSISTANT

## 2025-06-10 RX ORDER — METOPROLOL TARTRATE 50 MG
50 TABLET ORAL EVERY 12 HOURS SCHEDULED
Status: DISCONTINUED | OUTPATIENT
Start: 2025-06-10 | End: 2025-06-12 | Stop reason: HOSPADM

## 2025-06-10 RX ORDER — ALBUTEROL SULFATE 90 UG/1
2 INHALANT RESPIRATORY (INHALATION) EVERY 6 HOURS PRN
Status: DISCONTINUED | OUTPATIENT
Start: 2025-06-10 | End: 2025-06-10 | Stop reason: SDUPTHER

## 2025-06-10 RX ORDER — PREDNISONE 20 MG/1
40 TABLET ORAL DAILY
Status: DISCONTINUED | OUTPATIENT
Start: 2025-06-10 | End: 2025-06-12 | Stop reason: HOSPADM

## 2025-06-10 RX ORDER — ACETAMINOPHEN 325 MG/1
650 TABLET ORAL EVERY 6 HOURS PRN
Status: DISCONTINUED | OUTPATIENT
Start: 2025-06-10 | End: 2025-06-12 | Stop reason: HOSPADM

## 2025-06-10 RX ADMIN — PREDNISONE 40 MG: 20 TABLET ORAL at 13:56

## 2025-06-10 RX ADMIN — Medication 3 MG: at 22:07

## 2025-06-10 RX ADMIN — CEFTRIAXONE SODIUM 1000 MG: 10 INJECTION, POWDER, FOR SOLUTION INTRAVENOUS at 17:30

## 2025-06-10 RX ADMIN — GABAPENTIN 100 MG: 100 CAPSULE ORAL at 10:56

## 2025-06-10 RX ADMIN — GUAIFENESIN 600 MG: 600 TABLET ORAL at 22:07

## 2025-06-10 RX ADMIN — GUAIFENESIN 600 MG: 600 TABLET ORAL at 10:56

## 2025-06-10 RX ADMIN — OXYCODONE HYDROCHLORIDE AND ACETAMINOPHEN 500 MG: 500 TABLET ORAL at 17:28

## 2025-06-10 RX ADMIN — LEVOTHYROXINE SODIUM 50 MCG: 50 TABLET ORAL at 07:05

## 2025-06-10 RX ADMIN — INSULIN LISPRO 1 UNITS: 100 INJECTION, SOLUTION INTRAVENOUS; SUBCUTANEOUS at 17:26

## 2025-06-10 RX ADMIN — ACETAMINOPHEN 650 MG: 325 TABLET ORAL at 06:24

## 2025-06-10 RX ADMIN — LIDOCAINE 1 PATCH: 50 PATCH CUTANEOUS at 10:56

## 2025-06-10 RX ADMIN — INSULIN LISPRO 2 UNITS: 100 INJECTION, SOLUTION INTRAVENOUS; SUBCUTANEOUS at 22:07

## 2025-06-10 RX ADMIN — ALLOPURINOL 100 MG: 100 TABLET ORAL at 10:56

## 2025-06-10 RX ADMIN — SITAGLIPTIN 25 MG: 25 TABLET, FILM COATED ORAL at 12:00

## 2025-06-10 RX ADMIN — METOPROLOL TARTRATE 50 MG: 50 TABLET, FILM COATED ORAL at 22:07

## 2025-06-10 RX ADMIN — HEPARIN SODIUM 5000 UNITS: 5000 INJECTION INTRAVENOUS; SUBCUTANEOUS at 06:25

## 2025-06-10 RX ADMIN — GABAPENTIN 100 MG: 100 CAPSULE ORAL at 22:07

## 2025-06-10 RX ADMIN — ACETAMINOPHEN 650 MG: 325 TABLET ORAL at 12:00

## 2025-06-10 RX ADMIN — GABAPENTIN 100 MG: 100 CAPSULE ORAL at 17:28

## 2025-06-10 RX ADMIN — OXYCODONE HYDROCHLORIDE AND ACETAMINOPHEN 500 MG: 500 TABLET ORAL at 10:55

## 2025-06-10 NOTE — ASSESSMENT & PLAN NOTE
Rate controlled  C/w metoprolol but decrease dose to 50 mg BID due to low BP  PTA Coumadin 4 mg q day on hold

## 2025-06-10 NOTE — ASSESSMENT & PLAN NOTE
Recent Labs     06/09/25  1346 06/10/25  0624   HGB 7.8* 6.8*   Hgb at facility noted to be 6.5; Hgb on admit 7.8.  Subsequently trended down to 6.8 this morning.  Will provide 1 unit packed red cells transfusion.  Consent obtained.  Patient states he has not had blood transfusion before  Baseline Hgb mid 7's- low 8's over last 1.5 years   facility noted black stool but pt is on iron supplements. Heme pos stool.  Concern for significantly rising BUN. 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

## 2025-06-10 NOTE — ASSESSMENT & PLAN NOTE
Recent Labs     06/09/25  1346 06/10/25  0624   INR 11.83* 3.56*   INR on admit 11.83; prior INR 2.2 on 06/02  PTA Coumadin 4 mg q day held  Given 5 mg of Vit K in ED   Trend INR daily until therapeutic range

## 2025-06-10 NOTE — PHYSICAL THERAPY NOTE
Physical Therapy Cancellation Note       06/10/25 0840   Note Type   Note type Cancelled Session   Cancel Reasons Medical status   Additional Comments PT consult received and chart reviewed. Pt admitted with low hgb and supratherapeutic INR. Pt's most recent hgb 6.8. Per PT protocol will hold PT services for hgb <7.0. Will f/u as able and appropriate to complete PT evaluation.       Loren Mendiola, PT, DPT   Available via [employer]  NPI # [id number]  [place] License - [id number]  6/10/2025

## 2025-06-10 NOTE — ASSESSMENT & PLAN NOTE
Persistent cough/ gurgling; recent dx w/ PNA in late 05/2024, [facility]   CXR w/ consolidation concerning for pneumonia but CT chest with no evidence of consolidation  Pro-Natalio elevated at 1.42, improving; continue day #2 Rocephin

## 2025-06-10 NOTE — PLAN OF CARE
Recommended Diet: regular diet and thin liquids   Recommended Form of Meds: as tolerated    Aspiration precautions and swallowing strategies: upright posture, only feed when fully alert, slow rate of feeding, small bites/sips, and alternating bites and sips  Other Recommendations: Continue frequent oral care, provide set up assistance as necessary

## 2025-06-10 NOTE — CASE MANAGEMENT
Case Management Discharge Planning Note    Patient name Dmitriy Craven  Location S /S -01 MRN [id number]  : 3/12/1932 Date 6/10/2025       Current Admission Date: 2025  Current Admission Diagnosis:Supratherapeutic INR   Patient Active Problem List    Diagnosis Date Noted    Supratherapeutic INR 2025    Anemia 2025    Pneumonia 2025    Acute on chronic heart failure with preserved ejection fraction (HCC) 2025    RSV (acute bronchiolitis due to respiratory syncytial virus) 2025    Ambulatory dysfunction 2025    Hypothyroidism 2025    Pulmonary hypertension (HCC) 2025    Pleural effusion, left 2025    Pneumonia of left lower lobe due to infectious organism 2025    Rib pain on left side 2025    Spinal stenosis of lumbar region without neurogenic claudication 2025    Acute-on-chronic kidney injury  (HCC) 2025    Atrial fibrillation (HCC) 2025    Skin tear of right elbow without complication 2024    Skin tear of left elbow without complication 2024    Skin tear of right lower leg without complication 2024      LOS (days): 1  Geometric Mean LOS (GMLOS) (days):   Days to GMLOS:     OBJECTIVE:  Risk of Unplanned Readmission Score: 18.48         Current admission status: Inpatient   Preferred Pharmacy:   UNKNOWN - FOLLOW UP PRIOR TO DISCHARGE TO E-PRESCRIBE  No address on file      Primary Care Provider: Jass Perera    Primary Insurance: [employer] RE[employer]  Secondary Insurance:     DISCHARGE DETAILS:    Additional Comments: Per chart review, Pt resides at [facility], but was admitted from [facility]. CM to follow for dispo needs.

## 2025-06-10 NOTE — PLAN OF CARE
Problem: Potential for Falls  Goal: Patient will remain free of falls  Description: INTERVENTIONS:  - Educate patient/family on patient safety including physical limitations  - Instruct patient to call for assistance with activity   - Consider consulting OT/PT to assist with strengthening/mobility based on AM PAC & JH-HLM score  - Consult OT/PT to assist with strengthening/mobility   - Keep Call bell within reach  - Keep bed low and locked with side rails adjusted as appropriate  - Keep care items and personal belongings within reach  - Initiate and maintain comfort rounds  - Make Fall Risk Sign visible to staff  - Offer Toileting every 2 Hours, in advance of need  - Initiate/Maintain bed and chair alarm  - Obtain necessary fall risk management equipment:   - Apply yellow socks and bracelet for high fall risk patients  - Consider moving patient to room near nurses station  Outcome: Progressing     Problem: Prexisting or High Potential for Compromised Skin Integrity  Goal: Skin integrity is maintained or improved  Description: INTERVENTIONS:  - Identify patients at risk for skin breakdown  - Assess and monitor skin integrity including under and around medical devices   - Assess and monitor nutrition and hydration status  - Monitor labs  - Assess for incontinence   - Turn and reposition patient  - Assist with mobility/ambulation  - Relieve pressure over ghazala prominences   - Avoid friction and shearing  - Provide appropriate hygiene as needed including keeping skin clean and dry  - Evaluate need for skin moisturizer/barrier cream  - Collaborate with interdisciplinary team  - Patient/family teaching  - Consider wound care consult    Assess:  - Review Ace scale daily  - Clean and moisturize skin every   - Inspect skin when repositioning, toileting, and assisting with ADLS  - Assess under medical devices such as  every   - Assess extremities for adequate circulation and sensation     Bed Management:  - Have minimal  linens on bed & keep smooth, unwrinkled  - Change linens as needed when moist or perspiring  - Avoid sitting or lying in one position for more than  hours while in bed?Keep HOB at degrees   - Toileting:  - Offer bedside commode  - Assess for incontinence every   - Use incontinent care products after each incontinent episode such as     Activity:  - Mobilize patient  times a day  - Encourage activity and walks on unit  - Encourage or provide ROM exercises   - Turn and reposition patient every  Hours  - Use appropriate equipment to lift or move patient in bed  - Instruct/ Assist with weight shifting every  when out of bed in chair  - Consider limitation of chair time  hour intervals    Skin Care:  - Avoid use of baby powder, tape, friction and shearing, hot water or constrictive clothing  - Relieve pressure over bony prominences using   - Do not massage red bony areas    Next Steps:  - Teach patient strategies to minimize risks such as   - Consider consults to  interdisciplinary teams such as   Outcome: Progressing

## 2025-06-10 NOTE — OCCUPATIONAL THERAPY NOTE
Occupational Therapy Cancellation     Patient Name: Dmitriy Craven  Today's Date: 6/10/2025  Problem List  Principal Problem:    Supratherapeutic INR  Active Problems:    Acute-on-chronic kidney injury  (HCC)    Atrial fibrillation (HCC)    Hypothyroidism    Anemia    Pneumonia    Acute on chronic heart failure with preserved ejection fraction (HCC)    RSV (acute bronchiolitis due to respiratory syncytial virus)    Past Medical History  Past Medical History[1]  Past Surgical History  Past Surgical History[2]        06/10/25 0905   Note Type   Note type Cancelled Session  (Tuesday 6/10/25)   Cancel Reasons Medical status   Additional Comments OT orders received and chart review completed. Pt admitted w/ los Hgb and supratherapeutic INR. Most recent Hgb is 6.8. Will cancel and continue to follow as appropriate and schedule allows.       Cindy Nelson, OTR/L  [id number]  [id number]            [1]   Past Medical History:  Diagnosis Date    Anemia     CHF (congestive heart failure) (HCC)    [2] No past surgical history on file.

## 2025-06-10 NOTE — ASSESSMENT & PLAN NOTE
Lab Results   Component Value Date    EGFR 23 06/10/2025    EGFR 22 06/09/2025    EGFR 26 (L) 05/23/2025    CREATININE 2.34 (H) 06/10/2025    CREATININE 2.43 (H) 06/09/2025    CREATININE 2.26 (H) 05/23/2025     Baseline Cr. 1.9-2.3   Cr. mildly elevated on admit at Cr. 2.43   Continue to trend daily

## 2025-06-10 NOTE — PROGRESS NOTES
Progress Note - Hospitalist   Name: Dmitriy Craven 89 y.o. male I MRN: [id number]  Unit/Bed#: S -01 I Date of Admission: 6/9/2025   Date of Service: 6/10/2025 I Hospital Day: 1    Assessment & Plan  Supratherapeutic INR  Recent Labs     06/09/25  1346 06/10/25  0624   INR 11.83* 3.56*   INR on admit 11.83; prior INR 2.2 on 06/02  PTA Coumadin 4 mg q day held  Given 5 mg of Vit K in ED   Trend INR daily until therapeutic range  Anemia  Recent Labs     06/09/25  1346 06/10/25  0624   HGB 7.8* 6.8*   Hgb at facility noted to be 6.5; Hgb on admit 7.8.  Subsequently trended down to 6.8 this morning.  Will provide 1 unit packed red cells transfusion.  Consent obtained.  Patient states he has not had blood transfusion before  Baseline Hgb mid 7's- low 8's over last 1.5 years   facility noted black stool but pt is on iron supplements. Heme pos stool.  Concern for significantly rising BUN. I doubt patient would want invasive scopes therefore would monitor conservatively for now  Previously followed by [facility] hematology for FABIENNE, possible MDS,  & anemia of CKD; has received Venofer in the past   Check B12 and folate    Pneumonia  Persistent cough/ gurgling; recent dx w/ PNA in late 05/2024, admitted to [facility]   CXR w/ consolidation concerning for pneumonia but CT chest with no evidence of consolidation  Pro-Natalio elevated at 1.42, improving; continue day #2 Rocephin     Chronic diastolic heart failure (HCC)  Wt Readings from Last 3 Encounters:   06/10/25 59 kg (130 lb)   Prior 2D echocardiogram in 2023 with severe pulmonary hypertension and normal EF.  Repeat echocardiogram requested during this hospitalization   patient on oral Bumex prior to arrival.  Initially received IV Lasix on admission for concern of decompensated heart failure  However, at this time given acute illness and hypotension, would temporarily hold further diuretics for for today  RSV (acute bronchiolitis due to respiratory syncytial virus)  Positive for  RSV  Supportive care-- add prednisone burst and continue mucinex  Stable on room air  CKD (chronic kidney disease) stage 4, GFR 15-29 ml/min ([facility])  Lab Results   Component Value Date    EGFR 23 06/10/2025    EGFR 22 06/09/2025    EGFR 26 (L) 05/23/2025    CREATININE 2.34 (H) 06/10/2025    CREATININE 2.43 (H) 06/09/2025    CREATININE 2.26 (H) 05/23/2025     Baseline Cr. 1.9-2.3   Cr. mildly elevated on admit at Cr. 2.43   Continue to trend daily    Atrial fibrillation (HCC)  Rate controlled  C/w metoprolol but decrease dose to 50 mg BID due to low BP  PTA Coumadin 4 mg q day on hold     VTE Pharmacologic Prophylaxis:   Coumadin on hold    Mobility:   Basic Mobility Inpatient Raw Score: 13  -HLM Goal: 4: Move to chair/commode  JH-HLM Achieved: 2: Bed activities/Dependent transfer  JH-HLM Goal NOT achieved. Continue with multidisciplinary rounding and encourage appropriate mobility to improve upon -HLM goals.    Patient Centered Rounds: I performed bedside rounds with nursing staff today.   Discussions with Specialists or Other Care Team Provider: Case management    Education and Discussions with Family / Patient: Updated  (son) via phone.    Current Length of Stay: 1 day(s)  Current Patient Status: Inpatient   Certification Statement: The patient will continue to require additional inpatient hospital stay due to RSV and anemia  Discharge Plan: Anticipate discharge in 48 hrs to rehab facility.    Code Status: Level 3 - DNAR and DNI    Subjective   Patient complaining of some pain in his feet from his diabetic neuropathy.  Otherwise not reporting any bleeding.  He does endorse some cough but denies any shortness of breath.  No other new events or concerns    Objective :  Temp:  [97.7 °F (36.5 °C)-97.9 °F (36.6 °C)] 97.9 °F (36.6 °C)  HR:  [] 81  BP: ()/(43-86) 90/46  Resp:  [18-20] 20  SpO2:  [93 %-98 %] 93 %  O2 Device: None (Room air)    Body mass index is 20.36 kg/m².     Input and  Output Summary (last 24 hours):     Intake/Output Summary (Last 24 hours) at 6/10/2025 1257  Last data filed at 6/10/2025 1159  Gross per 24 hour   Intake 170 ml   Output 600 ml   Net -430 ml       Physical Exam  Vitals reviewed.   Constitutional:       General: He is not in acute distress.     Appearance: Normal appearance. He is not ill-appearing, toxic-appearing or diaphoretic.      Comments: Patient seen laying comfortably in bed overall nontoxic-appearing     Eyes:      General: No scleral icterus.        Right eye: No discharge.         Left eye: No discharge.      Conjunctiva/sclera: Conjunctivae normal.       Cardiovascular:      Rate and Rhythm: Normal rate and regular rhythm.      Heart sounds: No murmur heard.  Pulmonary:      Effort: No respiratory distress.      Breath sounds: No stridor. No wheezing or rhonchi.      Comments: Mild cough  Abdominal:      General: There is no distension.      Tenderness: There is no abdominal tenderness. There is no guarding.     Musculoskeletal:      Right lower leg: No edema.      Left lower leg: No edema.     Skin:     General: Skin is warm and dry.      Coloration: Skin is not jaundiced or pale.      Findings: No bruising, erythema, lesion or rash.     Neurological:      Mental Status: He is alert.      Comments: Awake alert interactive   Psychiatric:         Mood and Affect: Mood normal.         Thought Content: Thought content normal.           Lines/Drains:        Lab Results: I have reviewed the following results:   Results from last 7 days   Lab Units 06/10/25  0624 06/09/25  1346   WBC Thousand/uL 7.58 9.14   HEMOGLOBIN g/dL 6.8* 7.8*   HEMATOCRIT % 21.4* 23.8*   PLATELETS Thousands/uL 298 307   BANDS PCT %  --  2   LYMPHO PCT %  --  20   MONO PCT %  --  8   EOS PCT %  --  2     Results from last 7 days   Lab Units 06/10/25  0624 06/09/25  1346   SODIUM mmol/L 138 135   POTASSIUM mmol/L 4.0 4.5   CHLORIDE mmol/L 101 99   CO2 mmol/L 29 27   BUN mg/dL 123* 125*    CREATININE mg/dL 2.34* 2.43*   ANION GAP mmol/L 8 9   CALCIUM mg/dL 8.7 9.3   ALBUMIN g/dL  --  3.6   TOTAL BILIRUBIN mg/dL  --  0.57   ALK PHOS U/L  --  140*   ALT U/L  --  34   AST U/L  --  31   GLUCOSE RANDOM mg/dL 72 113     Results from last 7 days   Lab Units 06/10/25  0624   INR  3.56*     Results from last 7 days   Lab Units 06/10/25  1200 06/10/25  0736 06/09/25  2159 06/09/25  1805   POC GLUCOSE mg/dl 144* 87 193* 133         Results from last 7 days   Lab Units 06/10/25  0624 06/09/25  1346   PROCALCITONIN ng/ml 1.09* 1.42*       Recent Cultures (last 7 days):   Results from last 7 days   Lab Units 06/09/25  1716   LEGIONELLA URINARY ANTIGEN  Negative       CT chest    Last 24 Hours Medication List:     Current Facility-Administered Medications:     acetaminophen (TYLENOL) tablet 650 mg, Q6H PATRICK    albuterol (PROVENTIL HFA,VENTOLIN HFA) inhaler 2 puff, Q4H PRN    allopurinol (ZYLOPRIM) tablet 100 mg, Daily    ascorbic acid (VITAMIN C) tablet 500 mg, BID    [Held by provider] bumetanide (BUMEX) tablet 1 mg, Daily    calcium carbonate (TUMS) chewable tablet 1,000 mg, Daily PRN    ceftriaxone (ROCEPHIN) 1 g/50 mL in dextrose IVPB, Q24H, Last Rate: Stopped (06/09/25 1815)    docusate sodium (COLACE) capsule 100 mg, BID PRN    ferrous sulfate tablet 325 mg, Every Other Day    gabapentin (NEURONTIN) capsule 100 mg, TID    guaiFENesin (MUCINEX) 12 hr tablet 600 mg, BID    heparin (porcine) subcutaneous injection 5,000 Units, Q8H PATRICK **AND** [CANCELED] Platelet count, Once    insulin lispro (HumALOG/ADMELOG) 100 units/mL subcutaneous injection 1-5 Units, TID AC **AND** Fingerstick Glucose (POCT), TID AC    insulin lispro (HumALOG/ADMELOG) 100 units/mL subcutaneous injection 1-5 Units, HS    levothyroxine tablet 50 mcg, Early Morning    lidocaine (LIDODERM) 5 % patch 1 patch, Daily    melatonin tablet 3 mg, HS    metoprolol (LOPRESSOR) injection 5 mg, Q6H PRN    metoprolol tartrate (LOPRESSOR) tablet 50 mg, Q12H  PATRICK    ondansetron (ZOFRAN) injection 4 mg, Q6H PRN    predniSONE tablet 40 mg, Daily    sitaGLIPtin (JANUVIA) tablet 25 mg, Daily    Administrative Statements   Today, Patient Was Seen By: Nicole Valdes PA-C      **Please Note: This note may have been constructed using a voice recognition system.**

## 2025-06-10 NOTE — SPEECH THERAPY NOTE
Speech Language/Pathology  Speech-Language Pathology Bedside Swallow Evaluation      Patient Name: Dmitriy Craven    Today's Date: 6/10/2025     Problem List  Principal Problem:    Supratherapeutic INR  Active Problems:    Acute-on-chronic kidney injury  (HCC)    Atrial fibrillation (HCC)    Hypothyroidism    Anemia    Pneumonia    Acute on chronic heart failure with preserved ejection fraction (HCC)    RSV (acute bronchiolitis due to respiratory syncytial virus)      Past Medical History  Past Medical History[1]    Past Surgical History  Past Surgical History[2]    Summary   Pt presents w/ s/s suggestive of min oropharyngeal swallow skills, ultimately functional.     Complete labial seal for retrieval and containment of all materials, no anterior bolus loss present. Min prolonged rotary mastication of regular textures, ultimately functional. Complete bolus breakdown and adequate bolus transfer. Min diffuse oral residue noted, cleared w/ liquid wash. Suspected delayed swallow initiation though fair hyolaryngeal elevation to palpation. No overt s/s of aspiration, frequent belching throughout encounter, monitor need for GI.     Education initiated w/ pt regarding strategies to optimize swallow safety including frequent/thorough oral care and to notify medical staff if s/s of aspiration arise. Pt verbalized understanding and agreement, denies questions or concerns at this time.     Pt w/ increased risk of aspiration 2/2 abn lung imaging, recent admission at [facility] for PNA, advanced age, and multiple medical co-morbidities. SLP to f/u for diet tolerance as able and appropriate, will monitor need for VFSS completion if there is ongoing concern for aspiration.       Recommended Diet: regular diet and thin liquids   Recommended Form of Meds: as tolerated    Aspiration precautions and swallowing strategies: upright posture, only feed when fully alert, slow rate of feeding, small bites/sips, and alternating bites and sips  Other  Recommendations: Continue frequent oral care, provide set up assistance as necessary         Current Medical Status  Of note, pt's chart is marked for merge, both charts reviewed prior to encounter. Copied from H&P: Pt is a 89 y.o. male with a PMH of hypothyroidism, A. Fib chronic anemia, who presents with elevated Vit K.  Patient was sent from acute rehab by provider for anemia and elevated vitamin K.  Vitamin K recently checked 1 week ago and was in appropriate therapeutic range.  Hemoglobin PTA noted to be 6.5.  Patient does have a chronic history of anemia and is on iron supplements consistently.  Patient recently hospitalized in late 05/2025 at [facility] for pneumonia.  Patient completed his cephalosporin course as an outpatient.  Patient is currently having no acute source of bleeding.  Patient denies hematochezia, melena, bleeding from gums, hematemesis or hemoptysis.  Patient's only complaint is a persistent productive cough with some mild shortness of breath.  Patient notes some mild orthopnea.  Patient notes his respiratory status is not quite recovered since treatment for pneumonia at [facility].     Current Precautions:  Fall  Aspiration  Contact  Droplet     Allergies:  No known food allergies    Past medical history:  Please see H&P for details    Special Studies:  6/9/25 XR chest 1 view portable:  Cardiac consolidation concerning for pneumonia.  Small bilateral pleural effusions  Prominent atherosclerotic disease of the thoracic aorta    CT chest wo contrast pending.     History of VFSS:  N/A     Social/Education/Vocational Hx:  Pt from acute rehab     Swallow Information   Current Diet: regular diet and thin liquids   Baseline Diet: regular diet and thin liquids per pt report       Baseline Assessment   Behavior/Cognition: alert  Speech/Language Status: able to participate in conversation and able to follow commands  Patient Positioning: upright in bed  Pain Status/Interventions/Response to Interventions: No  report of or nonverbal indications of pain.       Oral Mechanism Exam  Facial: symmetrical  Labial: WFL  Lingual: WFL  Velum: symmetrical  Mandible: adequate ROM  Dentition: adequate  Vocal quality:clear/adequate   Volitional Cough: strong/productive   Respiratory Status: on RA        Consistencies Assessed and Performance   Consistencies Administered: thin liquids and hard solids    Oral Stage:   Complete labial seal for retrieval and containment of all materials, no anterior bolus loss present. Min prolonged rotary mastication of regular textures, ultimately functional. Complete bolus breakdown and adequate bolus transfer. Min diffuse oral residue noted, cleared w/ liquid wash.     Pharyngeal Stage:   Suspected delayed swallow initiation though fair hyolaryngeal elevation to palpation. No overt s/s of aspiration, frequent belching throughout encounter.     Esophageal Concerns: belching    Summary and Recommendations (see above)    Results Reviewed with: patient, RN, and MD     Treatment Recommended: as able and appropriate for diet tolerance, monitor need for VFSS completion     Dysphagia LTG  -Patient will demonstrate safe and effective oral intake (without overt s/s significant oral/pharyngeal dysphagia including s/s penetration or aspiration) for the highest appropriate diet level.     Short Term Goals:  -Pt will tolerate regular textures and thin liquid with no significant s/s oral or pharyngeal dysphagia across 1-3 diagnostic session/s    -Patient will comply with a Video/Modified Barium Swallow study for more complete assessment of swallowing anatomy/physiology/aspiration risk and to assess efficacy of treatment techniques so as to best guide treatment plan if medically indicated     Speech Therapy Prognosis   Prognosis: good/fair     Prognosis Considerations: age, medical status, and prior medical history         [1]   Past Medical History:  Diagnosis Date    Anemia     CHF (congestive heart failure) (HCC)     [2] No past surgical history on file.

## 2025-06-10 NOTE — ASSESSMENT & PLAN NOTE
Wt Readings from Last 3 Encounters:   06/10/25 59 kg (130 lb)   Prior 2D echocardiogram in 2023 with severe pulmonary hypertension and normal EF.  Repeat echocardiogram requested during this hospitalization   patient on oral Bumex prior to arrival.  Initially received IV Lasix on admission for concern of decompensated heart failure  However, at this time given acute illness and hypotension, would temporarily hold further diuretics for for today

## 2025-06-11 PROBLEM — E11.9 DM2 (DIABETES MELLITUS, TYPE 2) (HCC): Status: ACTIVE | Noted: 2025-06-11

## 2025-06-11 LAB
ABO GROUP BLD BPU: NORMAL
ANION GAP SERPL CALCULATED.3IONS-SCNC: 12 MMOL/L (ref 4–13)
AORTIC ROOT: 3.2 CM
AORTIC VALVE MEAN VELOCITY: 6.7 M/S
AV AREA BY CONTINUOUS VTI: 2.4 CM2
AV AREA PEAK VELOCITY: 2 CM2
AV LVOT MEAN GRADIENT: 1 MMHG
AV LVOT PEAK GRADIENT: 1 MMHG
AV MEAN PRESS GRAD SYS DOP V1V2: 2 MMHG
AV ORIFICE AREA US: 2.45 CM2
AV PEAK GRADIENT: 3 MMHG
AV VELOCITY RATIO: 0.71
AV VMAX SYS DOP: 0.92 M/S
BASOPHILS # BLD AUTO: 0 THOUSANDS/ÂΜL (ref 0–0.1)
BASOPHILS NFR BLD AUTO: 0 % (ref 0–1)
BPU ID: NORMAL
BSA FOR ECHO PROCEDURE: 1.68 M2
BUN SERPL-MCNC: 122 MG/DL (ref 5–25)
CALCIUM SERPL-MCNC: 8.9 MG/DL (ref 8.4–10.2)
CHLORIDE SERPL-SCNC: 103 MMOL/L (ref 96–108)
CO2 SERPL-SCNC: 22 MMOL/L (ref 21–32)
CREAT SERPL-MCNC: 2.25 MG/DL (ref 0.6–1.3)
CROSSMATCH: NORMAL
DOP CALC AO VTI: 16.02 CM
DOP CALC LVOT AREA: 3.46 CM2
DOP CALC LVOT CARDIAC INDEX: 1.87 L/MIN/M2
DOP CALC LVOT CARDIAC OUTPUT: 3.15 L/MIN
DOP CALC LVOT DIAMETER: 2.1 CM
DOP CALC LVOT PEAK VEL VTI: 11.32 CM
DOP CALC LVOT PEAK VEL: 0.54 M/S
DOP CALC LVOT STROKE INDEX: 23.8 ML/M2
DOP CALC LVOT STROKE VOLUME: 39.19
E WAVE DECELERATION TIME: 129 MS
EOSINOPHIL # BLD AUTO: 0 THOUSAND/ÂΜL (ref 0–0.61)
EOSINOPHIL NFR BLD AUTO: 0 % (ref 0–6)
ERYTHROCYTE [DISTWIDTH] IN BLOOD BY AUTOMATED COUNT: 18.4 % (ref 11.6–15.1)
FOLATE SERPL-MCNC: >22.3 NG/ML
FRACTIONAL SHORTENING: 26 (ref 28–44)
GFR SERPL CREATININE-BSD FRML MDRD: 24 ML/MIN/1.73SQ M
GLUCOSE SERPL-MCNC: 232 MG/DL (ref 65–140)
GLUCOSE SERPL-MCNC: 233 MG/DL (ref 65–140)
GLUCOSE SERPL-MCNC: 321 MG/DL (ref 65–140)
GLUCOSE SERPL-MCNC: 344 MG/DL (ref 65–140)
GLUCOSE SERPL-MCNC: 430 MG/DL (ref 65–140)
HCT VFR BLD AUTO: 26.8 % (ref 36.5–49.3)
HGB BLD-MCNC: 8.7 G/DL (ref 12–17)
HOLD SPECIMEN: NORMAL
IMM GRANULOCYTES # BLD AUTO: 0.06 THOUSAND/UL (ref 0–0.2)
IMM GRANULOCYTES NFR BLD AUTO: 1 % (ref 0–2)
INR PPP: 2.28 (ref 0.85–1.19)
INTERVENTRICULAR SEPTUM IN DIASTOLE (PARASTERNAL SHORT AXIS VIEW): 1.4 CM
INTERVENTRICULAR SEPTUM: 1.4 CM (ref 0.6–1.1)
LAAS-AP2: 19.8 CM2
LAAS-AP4: 20.9 CM2
LEFT ATRIUM AREA SYSTOLE SINGLE PLANE A4C: 22 CM2
LEFT ATRIUM SIZE: 4.1 CM
LEFT ATRIUM VOLUME (MOD BIPLANE): 61 ML
LEFT ATRIUM VOLUME INDEX (MOD BIPLANE): 36.3 ML/M2
LEFT INTERNAL DIMENSION IN SYSTOLE: 3.1 CM (ref 2.1–4)
LEFT VENTRICULAR INTERNAL DIMENSION IN DIASTOLE: 4.2 CM (ref 3.5–6)
LEFT VENTRICULAR POSTERIOR WALL IN END DIASTOLE: 1.3 CM
LEFT VENTRICULAR STROKE VOLUME: 41 ML
LV EF US.2D.A4C+ESTIMATED: 53 %
LVSV (TEICH): 41 ML
LYMPHOCYTES # BLD AUTO: 0.49 THOUSANDS/ÂΜL (ref 0.6–4.47)
LYMPHOCYTES NFR BLD AUTO: 9 % (ref 14–44)
MCH RBC QN AUTO: 33.2 PG (ref 26.8–34.3)
MCHC RBC AUTO-ENTMCNC: 32.5 G/DL (ref 31.4–37.4)
MCV RBC AUTO: 102 FL (ref 82–98)
MONOCYTES # BLD AUTO: 0.08 THOUSAND/ÂΜL (ref 0.17–1.22)
MONOCYTES NFR BLD AUTO: 2 % (ref 4–12)
MV PEAK E VEL: 71 CM/S
MV STENOSIS PRESSURE HALF TIME: 37 MS
MV VALVE AREA P 1/2 METHOD: 5.95
NEUTROPHILS # BLD AUTO: 4.56 THOUSANDS/ÂΜL (ref 1.85–7.62)
NEUTS SEG NFR BLD AUTO: 88 % (ref 43–75)
NRBC BLD AUTO-RTO: 0 /100 WBCS
PLATELET # BLD AUTO: 299 THOUSANDS/UL (ref 149–390)
PMV BLD AUTO: 10.8 FL (ref 8.9–12.7)
POTASSIUM SERPL-SCNC: 4.2 MMOL/L (ref 3.5–5.3)
PROTHROMBIN TIME: 25.8 SECONDS (ref 12.3–15)
RA PRESSURE ESTIMATED: 8 MMHG
RBC # BLD AUTO: 2.62 MILLION/UL (ref 3.88–5.62)
RIGHT ATRIUM AREA SYSTOLE A4C: 24.1 CM2
RIGHT VENTRICLE ID DIMENSION: 4.6 CM
RV PSP: 61 MMHG
SL CV LEFT ATRIUM LENGTH A2C: 5.5 CM
SL CV LV EF: 55
SL CV PED ECHO LEFT VENTRICLE DIASTOLIC VOLUME (MOD BIPLANE) 2D: 79 ML
SL CV PED ECHO LEFT VENTRICLE SYSTOLIC VOLUME (MOD BIPLANE) 2D: 38 ML
SODIUM SERPL-SCNC: 137 MMOL/L (ref 135–147)
TR MAX PG: 53 MMHG
TR PEAK VELOCITY: 3.6 M/S
TRICUSPID ANNULAR PLANE SYSTOLIC EXCURSION: 1 CM
TRICUSPID VALVE PEAK REGURGITATION VELOCITY: 3.63 M/S
UNIT DISPENSE STATUS: NORMAL
UNIT PRODUCT CODE: NORMAL
UNIT PRODUCT VOLUME: 350 ML
UNIT RH: NORMAL
VIT B12 SERPL-MCNC: 1332 PG/ML (ref 180–914)
WBC # BLD AUTO: 5.19 THOUSAND/UL (ref 4.31–10.16)

## 2025-06-11 PROCEDURE — 94668 MNPJ CHEST WALL SBSQ: CPT

## 2025-06-11 PROCEDURE — 97167 OT EVAL HIGH COMPLEX 60 MIN: CPT

## 2025-06-11 PROCEDURE — 82746 ASSAY OF FOLIC ACID SERUM: CPT | Performed by: PHYSICIAN ASSISTANT

## 2025-06-11 PROCEDURE — 99232 SBSQ HOSP IP/OBS MODERATE 35: CPT | Performed by: PHYSICIAN ASSISTANT

## 2025-06-11 PROCEDURE — 82607 VITAMIN B-12: CPT | Performed by: PHYSICIAN ASSISTANT

## 2025-06-11 PROCEDURE — 85025 COMPLETE CBC W/AUTO DIFF WBC: CPT | Performed by: PHYSICIAN ASSISTANT

## 2025-06-11 PROCEDURE — 97530 THERAPEUTIC ACTIVITIES: CPT

## 2025-06-11 PROCEDURE — 97163 PT EVAL HIGH COMPLEX 45 MIN: CPT

## 2025-06-11 PROCEDURE — 92526 ORAL FUNCTION THERAPY: CPT

## 2025-06-11 PROCEDURE — 85610 PROTHROMBIN TIME: CPT | Performed by: PHYSICIAN ASSISTANT

## 2025-06-11 PROCEDURE — 82948 REAGENT STRIP/BLOOD GLUCOSE: CPT

## 2025-06-11 PROCEDURE — 80048 BASIC METABOLIC PNL TOTAL CA: CPT | Performed by: PHYSICIAN ASSISTANT

## 2025-06-11 RX ORDER — INSULIN GLARGINE 100 [IU]/ML
10 INJECTION, SOLUTION SUBCUTANEOUS EVERY MORNING
Status: DISCONTINUED | OUTPATIENT
Start: 2025-06-11 | End: 2025-06-12 | Stop reason: HOSPADM

## 2025-06-11 RX ORDER — ALBUTEROL SULFATE 0.83 MG/ML
2.5 SOLUTION RESPIRATORY (INHALATION) EVERY 6 HOURS PRN
Status: DISCONTINUED | OUTPATIENT
Start: 2025-06-11 | End: 2025-06-12 | Stop reason: HOSPADM

## 2025-06-11 RX ORDER — WARFARIN SODIUM 2 MG/1
2 TABLET ORAL
Status: DISCONTINUED | OUTPATIENT
Start: 2025-06-11 | End: 2025-06-12 | Stop reason: HOSPADM

## 2025-06-11 RX ORDER — PANTOPRAZOLE SODIUM 40 MG/1
40 TABLET, DELAYED RELEASE ORAL
Status: DISCONTINUED | OUTPATIENT
Start: 2025-06-11 | End: 2025-06-12 | Stop reason: HOSPADM

## 2025-06-11 RX ADMIN — OXYCODONE HYDROCHLORIDE AND ACETAMINOPHEN 500 MG: 500 TABLET ORAL at 17:34

## 2025-06-11 RX ADMIN — GABAPENTIN 100 MG: 100 CAPSULE ORAL at 09:13

## 2025-06-11 RX ADMIN — INSULIN LISPRO 3 UNITS: 100 INJECTION, SOLUTION INTRAVENOUS; SUBCUTANEOUS at 17:35

## 2025-06-11 RX ADMIN — METOPROLOL TARTRATE 50 MG: 50 TABLET, FILM COATED ORAL at 21:07

## 2025-06-11 RX ADMIN — CEFTRIAXONE SODIUM 1000 MG: 10 INJECTION, POWDER, FOR SOLUTION INTRAVENOUS at 17:34

## 2025-06-11 RX ADMIN — ALLOPURINOL 100 MG: 100 TABLET ORAL at 09:13

## 2025-06-11 RX ADMIN — LEVOTHYROXINE SODIUM 50 MCG: 50 TABLET ORAL at 05:09

## 2025-06-11 RX ADMIN — PREDNISONE 40 MG: 20 TABLET ORAL at 09:13

## 2025-06-11 RX ADMIN — FERROUS SULFATE TAB 325 MG (65 MG ELEMENTAL FE) 325 MG: 325 (65 FE) TAB at 09:13

## 2025-06-11 RX ADMIN — GABAPENTIN 100 MG: 100 CAPSULE ORAL at 21:07

## 2025-06-11 RX ADMIN — GUAIFENESIN 600 MG: 600 TABLET ORAL at 09:13

## 2025-06-11 RX ADMIN — SITAGLIPTIN 25 MG: 25 TABLET, FILM COATED ORAL at 09:14

## 2025-06-11 RX ADMIN — INSULIN GLARGINE 10 UNITS: 100 INJECTION, SOLUTION SUBCUTANEOUS at 09:13

## 2025-06-11 RX ADMIN — LIDOCAINE 1 PATCH: 50 PATCH CUTANEOUS at 09:14

## 2025-06-11 RX ADMIN — METOPROLOL TARTRATE 50 MG: 50 TABLET, FILM COATED ORAL at 09:13

## 2025-06-11 RX ADMIN — GUAIFENESIN 600 MG: 600 TABLET ORAL at 21:07

## 2025-06-11 RX ADMIN — OXYCODONE HYDROCHLORIDE AND ACETAMINOPHEN 500 MG: 500 TABLET ORAL at 09:13

## 2025-06-11 RX ADMIN — INSULIN LISPRO 2 UNITS: 100 INJECTION, SOLUTION INTRAVENOUS; SUBCUTANEOUS at 09:14

## 2025-06-11 RX ADMIN — INSULIN LISPRO 4 UNITS: 100 INJECTION, SOLUTION INTRAVENOUS; SUBCUTANEOUS at 21:09

## 2025-06-11 RX ADMIN — WARFARIN SODIUM 2 MG: 2 TABLET ORAL at 17:34

## 2025-06-11 RX ADMIN — GABAPENTIN 100 MG: 100 CAPSULE ORAL at 17:34

## 2025-06-11 RX ADMIN — Medication 3 MG: at 21:07

## 2025-06-11 NOTE — ASSESSMENT & PLAN NOTE
Recent Labs     06/10/25  0624 06/10/25  1916 06/11/25  0519   HGB 6.8* 8.7* 8.7*   Patient with chronic anemia in the 7-8 range previously being followed by [facility] hematology for possible anemia of CKD +/- MDS  Hgb at facility noted to be 6.5; on admit 7.8.  Subsequently trended down to 6.8 on the morning of Peace 10 prompting us to provide 1 unit PRBC.  Improved today to 8.7  facility noted black stool but pt is on iron supplements. Heme pos stool.  Concern for significantly rising BUN.  Cannot rule out GI bleed.  Per my discussion with the patient he would prefer to avoid invasive scopes therefore would monitor conservatively for now  Pending results of B12 and folate

## 2025-06-11 NOTE — ASSESSMENT & PLAN NOTE
Positive for RSV, but fortunately remains clinically well  Supportive care--5 days prednisone burst and continue mucinex  Add respiratory protocol today for wheezing heard on exam  Continue flutter valve  Stable on room air

## 2025-06-11 NOTE — PLAN OF CARE
Problem: PHYSICAL THERAPY ADULT  Goal: Performs mobility at highest level of function for planned discharge setting.  See evaluation for individualized goals.  Description: Treatment/Interventions: Functional transfer training, LE strengthening/ROM, Therapeutic exercise, Endurance training, Patient/family training, Equipment eval/education, Bed mobility, Compensatory technique education (wheelchair mobility training)          See flowsheet documentation for full assessment, interventions and recommendations.  Note:    Problem List: Decreased strength, Decreased endurance, Impaired balance, Decreased mobility, Decreased safety awareness, Pain, Impaired hearing  Assessment: Pt was sent from acute rehab by provider for anemia and elevated vitamin K. Dx: supratherapeutic INR, anemia, pneumonia, chronic diastolic heart failure, a-fib, and RSV. order placed for PT eval and tx, w/ activity order of activity as tolerated. pt presents w/ comorbidities of a-fib, CHF, CKD, pneumonia, pulmonary HT, cellulitis, and anemia and personal factors of advanced age, mobilizing w/ assistive device, and hearing impairments. pt presents w/ pain, weakness, decreased endurance, impaired balance, impaired hearing, decreased safety awareness, and fall risk. these impairments are evident in findings from physical examination (weakness), mobility assessment (need for min to mod assist w/ all phases of mobility and need for cueing for mobility technique), and Barthel Index: 55/100. pt needed input for mobility and breathing technique. pt is at risk for falls due to physical and safety awareness deficits. pt's clinical presentation is unstable/unpredictable (evident in poor blood sugar control, anemia, poor blood pressure control, need for assist w/ all phases of mobility, pain impacting overall mobility status, and need for input for mobility technique/safety). pt needs inpatient PT tx to improve mobility deficits and progress mobility  training as appropriate.        Rehab Resource Intensity Level, PT: II (Moderate Resource Intensity)    See flowsheet documentation for full assessment.

## 2025-06-11 NOTE — DISCHARGE SUPPORT
Case Management Assessment & Discharge Planning Note    Patient name Dmitriy Craven  Location S /S -01 MRN [id number]  : 3/12/1932 Date 2025       Current Admission Date: 2025  Current Admission Diagnosis:Supratherapeutic INR   Patient Active Problem List    Diagnosis Date Noted    DM2 (diabetes mellitus, type 2) (HCC) 2025    Supratherapeutic INR 2025    Anemia 2025    Pneumonia 2025    Chronic diastolic heart failure (HCC) 2025    RSV (acute bronchiolitis due to respiratory syncytial virus) 2025    Ambulatory dysfunction 2025    Hypothyroidism 2025    Pulmonary hypertension (HCC) 2025    Pleural effusion, left 2025    Pneumonia of left lower lobe due to infectious organism 2025    Rib pain on left side 2025    Spinal stenosis of lumbar region without neurogenic claudication 2025    CKD (chronic kidney disease) stage 4, GFR 15-29 ml/min ([facility]) 2025    Atrial fibrillation (HCC) 2025    Skin tear of right elbow without complication 2024    Skin tear of left elbow without complication 2024    Skin tear of right lower leg without complication 2024      LOS (days): 2  Geometric Mean LOS (GMLOS) (days): 4  Days to GMLOS:2.1   Facility Authorization Initiated  [facility] received request for auth from : Nicole ALBERTO  Authorization Request Submitted for: SNF  Requested Start of Care Date: 25  Facility Name: [facility]  Facility NPI: [id number]  Facility MD: Ema Pizano  Facility MD NPI: [id number]  Authorization initiated by contacting insurance: [facility]  Via: [place]  Clinicals submitted via: Portal Attachment  Pending reference #: [id number]   notified: Nicole ALBERTO     Updates to authorization status will be noted in chart.    Please reach out to CM for updates on any clinical information.

## 2025-06-11 NOTE — PHYSICAL THERAPY NOTE
PHYSICAL THERAPY EVALUATION NOTE    Patient Name: Dmitriy Craven  Today's Date: 6/11/2025  AGE:   89 y.o.  Mrn:   [id number]  ADMIT DX:  Coagulopathy (HCC) [D68.9]  Abnormal laboratory test result [R89.9]  Chronic anemia [D64.9]  Acute-on-chronic kidney injury  (HCC) [N17.9, N18.9]  Acute renal failure superimposed on stage 4 chronic kidney disease, unspecified acute renal failure type (HCC) [N17.9, N18.4]    Past Medical History[1]  Length Of Stay: 2  PHYSICAL THERAPY EVALUATION :    06/11/25 0936   PT Last Visit   PT Visit Date 06/11/25   Note Type   Note type Evaluation   Pain Assessment   Pain Assessment Tool 0-10   Pain Score 7  (improved to 0/10 at end of session)   Pain Location/Orientation Orientation: Bilateral;Location: [facility] Pain Intervention(s) Repositioned;Ambulation/increased activity   Restrictions/Precautions   Other Precautions Contact/isolation;Droplet precautions;Chair Alarm;Bed Alarm;Fall Risk;Pain;Hard of hearing  ([place])   Home Living   Type of Home SNF  (pt was receiving rehab at [facility])   Additional Comments pt reports mobilizing in manual wheelchair (using UEs and LEs). independent w/ most ADLs. pt usually lives at [facility]. mobilized in transport chair in room and manual wheelchair to get to dining room. performed sit pivot transfers. independent w/ most ADLs and received assist w/ IADLs. no falls in last 6 months.   General   Additional Pertinent History 6/11/25 at 7:41 glucose was 232. 6/10/25 at 6:24 hemoglobin was 6.8 and at 16:00 blood pressure was 97/62.   Family/Caregiver Present No   Cognition   Arousal/Participation Alert   Attention Within functional limits   Orientation Level Oriented X4;Other (Comment)  (pt was identified w/ full name, birth date)   Following Commands Follows one step commands without difficulty   Comments room air resting pulse ox 94% and  102 BPM, active 90% and 118 BPM.   Subjective   Subjective pt seen supine in bed. agreed to PT eval. initially reported having pain of bilateral feet which was relieved by end of session.   RLE Assessment   RLE Assessment WFL  (3 to 3+/5)   LLE Assessment   LLE Assessment WFL  (3 to 3+/5)   Vision-Basic Assessment   Current Vision Wears glasses only for reading   Bed Mobility   Supine to Sit 3  Moderate assistance   Additional items Assist x 1;HOB elevated;Bedrails;Increased time required;Verbal cues;LE management  (for bedrail use, trunk/LE positioning)   Transfers   Sit pivot 3  Moderate assistance  (initially needed to initiate transfer then needded minx1 to complete. transfer was completed moving to pt's right.)   Additional items Assist x 1;Increased time required;Verbal cues  (for hand placement, LE positioning)   Balance   Static Sitting Fair +  (to fair-)   Static Standing Poor +  (to poor)   Activity Tolerance   Activity Tolerance Patient limited by fatigue;Patient limited by pain   Nurse Made Aware spoke to Cindy Horner OT, Nicole De La Garza CM   Assessment   Problem List Decreased strength;Decreased endurance;Impaired balance;Decreased mobility;Decreased safety awareness;Pain;Impaired hearing   Assessment Pt was sent from acute rehab by provider for anemia and elevated vitamin K. Dx: supratherapeutic INR, anemia, pneumonia, chronic diastolic heart failure, a-fib, and RSV. order placed for PT eval and tx, w/ activity order of activity as tolerated. pt presents w/ comorbidities of a-fib, CHF, CKD, pneumonia, pulmonary HT, cellulitis, and anemia and personal factors of advanced age, mobilizing w/ assistive device, and hearing impairments. pt presents w/ pain, weakness, decreased endurance, impaired balance, impaired hearing, decreased safety awareness, and fall risk. these impairments are evident in findings from physical examination (weakness), mobility assessment (need for min to mod assist w/ all  phases of mobility and need for cueing for mobility technique), and Barthel Index: 55/100. pt needed input for mobility and breathing technique. pt is at risk for falls due to physical and safety awareness deficits. pt's clinical presentation is unstable/unpredictable (evident in poor blood sugar control, anemia, poor blood pressure control, need for assist w/ all phases of mobility, pain impacting overall mobility status, and need for input for mobility technique/safety). pt needs inpatient PT tx to improve mobility deficits and progress mobility training as appropriate.   Goals   Patient Goals I want to go home   STG Expiration Date 06/25/25   Short Term Goal #1 pt will: Increase bilateral LE strength 1/2 grade to facilitate independent mobility, Perform bed mobility independently to increase level of independence, Perform all transfers independently to improve independence, Increase static standing balance 1 grade to decrease risk for falls, Tolerate 3 hr OOB to faciliate upright tolerance, Tolerate seated at EOB 30 minutes independently to facilitate functional task performance, Tolerate standing 2 minutes w/ supervision to facilitate functional task performance, and Improve Barthel Index score to 75 or greater to facilitate independence   PT Treatment Day 1   Plan   Treatment/Interventions Functional transfer training;LE strengthening/ROM;Therapeutic exercise;Endurance training;Patient/family training;Equipment eval/education;Bed mobility;Compensatory technique education  (wheelchair mobility training)   PT Frequency 3-5x/wk   Discharge Recommendation   Rehab Resource Intensity Level, PT II (Moderate Resource Intensity)   AM-PAC Basic Mobility Inpatient   Turning in Flat Bed Without Bedrails 3   Lying on Back to Sitting on Edge of Flat Bed Without Bedrails 2   Moving Bed to Chair 3   Standing Up From Chair Using Arms 2   Walk in Room 1   Climb 3-5 Stairs With Railing 1   Basic Mobility Inpatient Raw Score 12    Basic Mobility Standardized Score 32.23   [facility] Highest Level Of Mobility   -[facility] Goal 4: Move to chair/commode   -[facility] Achieved 5: Stand (1 or more minutes)   Barthel Index   Feeding 10   Bathing 0   Grooming Score 5   Dressing Score 5   Bladder Score 10   Bowels Score 10   Toilet Use Score 5   Transfers (Bed/Chair) Score 10   Mobility (Level Surface) Score 0   Stairs Score 0   Barthel Index Score 55   Additional Treatment Session   Start Time 0954   End Time 1004   Treatment Assessment Pt agreed to participate in PT intervention. Therapist incorporated roller walker use w/ mobility to improve safety.  Sit <---> stand transfer w/ min to modx1. Stand pivot transfer w/ roller walker w/ minx1. Transfer was completed twice (bedside chair to edge of bed and then back). Additional mobility was not possible due to fatigue. Pt was noted to have improvement w/ use of roller walker w/ decreased level of assist to maintain safety. continued inpatient PT tx is indicated to reduce fall risk.   Equipment Use roller walker   Additional Treatment Day 1   End of Consult   Patient Position at End of Consult Bedside chair;Bed/Chair alarm activated;All needs within reach     The patient's AM-PAC Basic Mobility Inpatient Short Form Raw Score is 12. A Raw score of less than or equal to 16 suggests the patient may benefit from discharge to post-acute rehabilitation services. Please also refer to the recommendation of the Physical Therapist for safe discharge planning.    Skilled PT recommended while in hospital and upon DC to progress pt toward treatment goals.     Ramses Ingram, PT         [1]   Past Medical History:  Diagnosis Date    Anemia     CHF (congestive heart failure) ([facility])

## 2025-06-11 NOTE — OCCUPATIONAL THERAPY NOTE
Occupational Therapy Evaluation     Patient Name: Dmitriy Craven  Today's Date: 2025  Problem List  Principal Problem:    Supratherapeutic INR  Active Problems:    CKD (chronic kidney disease) stage 4, GFR 15-29 ml/min (HCC)    Atrial fibrillation (HCC)    Anemia    Pneumonia    Chronic diastolic heart failure (HCC)    RSV (acute bronchiolitis due to respiratory syncytial virus)    DM2 (diabetes mellitus, type 2) ([facility])    Past Medical History  Past Medical History[1]  Past Surgical History  Past Surgical History[2]        25 1004   OT Last Visit   OT Visit Date 25  (Wednesday)   Note Type   Note type Evaluation Request for Authorization   Additional Comments Pt ID by name and    Pain Assessment   Pain Assessment Tool 0-10   Pain Score 7   Pain Location/Orientation Location: Foot;Orientation: Bilateral   Effect of Pain on Daily Activities activity + standing tolerance   Patient's Stated Pain Goal No pain   Hospital Pain Intervention(s) Repositioned;Emotional support;Ambulation/increased activity   Restrictions/Precautions   Weight Bearing Precautions Per Order No   Other Precautions Hard of hearing;Pain;Fall Risk;Chair Alarm;Bed Alarm;Contact/isolation;Droplet precautions  ([place] room air)   Home Living   Type of Home Assisted living;Other (Comment)  (At true baseline resides at [facility]; admitted from [place] [facility])   Home Layout One level   Bathroom Shower/Tub Walk-in shower   Bathroom Equipment Grab bars in shower;Grab bars around toilet;Shower chair   Bathroom Accessibility Accessible   Home Equipment Wheelchair-manual;Other (Comment)  (Transport chair)   Prior Function   Level of [place] Needs assistance with IADLS;Modified independent with wheelchair;Needs assistance with ADLs   Lives With Facility staff   Receives Help From Personal care attendant   IADLs Family/Friend/Other provides meals;Family/Friend/Other provides transportation   Falls in the last  "6 months 0  (Pt reports no falls)   Vocational Retired   Comments Pt resides at [facility] at true baseline; admitted from [place] [facility]. Pt reports completing LBD while supine in bed and receiving (A) for toileting + bathing. Pt  utilizes transport chair + W/C for functional mobility.   Lifestyle   Autonomy Pt was (I) w/ some ADLs, mod (I) w/ functional mobility utilizing w/c, and requires (A) w/ IADLs. Pt reports requiring (A) for toileting and bathing   Reciprocal Relationships Supportive facility staff   Service to Others Retired   Intrinsic Gratification Pt reported reading and watching the news   General   Additional Pertinent History PMHx includes spinal stenosis of lumbar, HTN, ambulatory dysfunction, and CHF.   Family/Caregiver Present No   Subjective   Subjective \"I don't feel any pain when I stand up\"   ADL   Where Assessed Edge of bed   Eating Assistance 6  Modified independent   Eating Deficit Setup;Increased time to complete   Grooming Assistance 4  Minimal Assistance   Grooming Deficit Other (Comment)  (Based on obs in eval + clinical judgement anticipate min A)   UB Bathing Assistance Unable to assess   UB Bathing Deficit Other (Comment);Verbal cueing;Supervision/safety;Increased time to complete  (Pt reported requiring (A) w/ bathing; upon obs anticipate min A)   LB Bathing Assistance Unable to assess   LB Bathing Deficit Other (Comment);Supervision/safety;Increased time to complete;Setup;Steadying;Verbal cueing  (Pt reported requiring (A) w/ bathing; upon obs anticipate mod A)   UB Dressing Assistance 5  Supervision/Setup   UB Dressing Deficit Other (Comment)  (Based on obs in eval + clinical judgement anticipate S)   LB Dressing Assistance 2  Maximal Assistance   LB Dressing Deficit Don/doff R sock;Fasteners  (Pt required max A to don R sock + fasten and adjust shoes while seated EOB.)   Toileting Assistance  Unable to assess   Toileting Deficit Increased time to " complete;Perineal hygiene;Steadying;Verbal cueing;Supervison/safety;Setup;Clothing management up;Clothing management down  (Pt denied need to void and reported requiring (A) to get on/off toilet + hygiene; anticipate mod A)   Bed Mobility   Supine to Sit 3  Moderate assistance   Additional items Increased time required;HOB elevated;Assist x 1;Verbal cues  (Pt completed supine<>sit w/ mod A utilizing gabbie pads for trunk management to EOB + handheld assistance for foward trunk flexion)   Sit to Supine Unable to assess   Additional Comments Pt was seated OOB in recliner at end of session   Transfers   Sit to Stand 3  Moderate assistance   Stand to Sit 3  Moderate assistance   Stand pivot 4  Minimal assistance  (To pt's R to recliner chair w/ mod A to intiate; progressing to min A to complete transfer)   Additional items Assist x 1;Increased time required;Verbal cues   Additional Comments Pt completed 3x functional transfers; stand pivot w/ chair positioned in front while seated EOB. Pt utilized armrest to steady in stance + turn R while crossing midline w/ mod A; progressing to min A to complete transfer when chair positioned to pt's R. Pt completed sit<>stand from EOB w/ mod A; introduce RW for steadying in stance. Pt demonstrated improved stability and poor (+) standing balance   Functional Mobility   Functional Mobility   (Unable to assess)   Additional Comments Will continue to follow w/ LRAD as appriopriate. W/C bound at baseline   Balance   Static Sitting Fair -   Static Standing Poor +   Activity Tolerance   Activity Tolerance Patient limited by fatigue   Medical Staff Made Aware Care coordination w/ PT RHONA d/t anticipated physical assistance   Nurse Made Aware Spoke w/ GRACY GUTHRIE Assessment   RUE Assessment X   RUE Strength   RUE Overall Strength Within Functional Limits - able to perform ADL tasks with strength   LUE Assessment   LUE Assessment X   LUE Strength   LUE Overall Strength Within Functional  Limits - able to perform ADL tasks with strength   Vision-Basic Assessment   Current Vision Wears glasses only for reading   Cognition   Overall Cognitive Status WFL   Arousal/Participation Alert;Cooperative;Responsive   Attention Attends with cues to redirect  (required cues + repetition d/t hard of hearing)   Orientation Level Oriented to person;Oriented to place;Oriented to situation   Memory Decreased short term memory   Following Commands Follows one step commands without difficulty   Comments Pt was alert upon arrival of therapist; required repeptition + VC of hand placement on RW. Pt demonstrated diffculty w/ recall of recent events PTA at [facility]   Assessment   Limitation Decreased ADL status;Decreased Safe judgement during ADL;Decreased cognition;Decreased endurance;Decreased high-level ADLs;Decreased self-care trans;Decreased UE strength   Assessment Pt is a 89 y.o. male admitted to [facility] 6/[facility]/2025 due to abormal lab values; diagnosed w/ supratherapeutic INR. PMHx includes spinal stenosis of lumbar, HTN, ambulatory dysfunction, and CHF. Pt provided blood transfusion 6/10/2025; HGB was 8.7 prior to session. Pt admitted from [facility], resides at [facility] at true baseline. Pt is (I) w/ ADLs, mod (I) w/ functional mobility, and requires (A) for IADLs, toileting, and bathing at baseline. Supports of occupational performance include supportive facility staff, accessible home layout, and access to DME/AE. Barriers impacting occupational performance includes advanced age and hard of hearing. Upon arrival of therapist pt was alert and agreeable to participate. Pt completed supine<>sit and sit<>stand w/ mod A. Pt completed LBD w/ max A. Pt completed stand pivot to chair w/ mod A progressing to min A. Pt demonstrates impaired balance+ pain, decreased endurance, activity+standing tolerance, and fatigue. Pt would benefit from OT services 3-5x/wk to address deficits stated above and  "ensure safe return to prior level of function. Recommend Level II rehab resource intensity upon medical stability.   Goals   Patient Goals \"To get better\"   LT Time Frame 7-10   Plan   Treatment Interventions ADL retraining;Functional transfer training;UE strengthening/ROM;Endurance training;Patient/family training;Continued evaluation;Energy conservation;Activityengagement;Compensatory technique education;Equipment evaluation/education   Goal Expiration Date 06/21/25   OT Treatment Day 0   OT Frequency 3-5x/wk   Discharge Recommendation   Rehab Resource Intensity Level, OT II (Moderate Resource Intensity)   AM-PAC Daily Activity Inpatient   Lower Body Dressing 2   Bathing 2   Toileting 2   Upper Body Dressing 3   Grooming 4   Eating 4   Daily Activity Raw Score 17   Daily Activity Standardized Score (Calc for Raw Score >=11) 37.26   AM-PAC Applied Cognition Inpatient   Following a Speech/Presentation 4   Understanding Ordinary Conversation 4   Taking Medications 4   Remembering Where Things Are Placed or Put Away 4   Remembering List of 4-5 Errands 4   Taking Care of Complicated Tasks 4   Applied Cognition Raw Score 24   Applied Cognition Standardized Score 62.21   Barthel Index   Feeding 5   Bathing 0   Grooming Score 0   Dressing Score 5   Bladder Score 10   Bowels Score 10   Toilet Use Score 5   Transfers (Bed/Chair) Score 5   Mobility (Level Surface) Score 0   Stairs Score 0   Barthel Index Score 40   End of Consult   Patient Position at End of Consult Bedside chair;Bed/Chair alarm activated;All needs within reach   Nurse Communication Nurse aware of consult   End of Consult Comments Pt seated OOB in chair w/ alarm activated, call bell in reach, and needs met   Licensure   [place] License Number  Alexia Efrain, OTS       The patient's raw score on the AM-PAC Daily Activity Inpatient Short Form is 17. A raw score of less than 19 suggests the patient may benefit from discharge to post-acute rehabilitation " "services. Please refer to the recommendation of the Occupational Therapist for safe discharge planning.     Goals to be met by 6/21/2025 to promote patient's goal \"to get better\"     Pt will complete self-care task while seated for 5-10 minutes w/ no breaks to improve activity tolerance     Pt will perform bed<> chair transfers w/ mod (I) to support engagement and participation in ADLs    Pt will complete UB dressing w/ mod (I) to improve independence upon d/c     Pt will complete LB dressing w/ min A utilizing LRAD/LHAE to improve independence in ADLs upon return home     Pt will complete toileting task w min A 2 out of 3 trials to maximize independence + safety in ADLs     Pt will complete UB bathing w/ mod (I) to improve independence upon d/c     Pt will demonstrate understanding of energy conservation techniques + pacing strategies to promote participation in ADLs    DORINA Castano            [1]   Past Medical History:  Diagnosis Date    Anemia     CHF (congestive heart failure) (HCC)    [2] No past surgical history on file.    "

## 2025-06-11 NOTE — ASSESSMENT & PLAN NOTE
Lab Results   Component Value Date    HGBA1C 6.2 (H) 05/19/2025       Recent Labs     06/10/25  1517 06/10/25  1656 06/10/25  2206 06/11/25  0741   POCGLU 182* 223* 297* 233*     Blood Sugar Average: Last 72 hrs:  (P) 186.5  A1c at goal but patient demonstrating evidence of steroid-induced hyperglycemia  Continue sliding scale insulin and add basal coverage for now

## 2025-06-11 NOTE — ASSESSMENT & PLAN NOTE
Wt Readings from Last 3 Encounters:   06/11/25 59 kg (130 lb 1.1 oz)   Acute heart failure ruled out  Prior 2D echocardiogram in 2023 with severe pulmonary hypertension and normal EF.  Repeat echo  patient on oral Bumex prior to arrival.  Initially received IV Lasix on admission for concern of decompensated heart failure  However, at this time given acute illness and hypotension, would temporarily hold further diuretics for for today and consider resuming tomorrow stable

## 2025-06-11 NOTE — UTILIZATION REVIEW
Initial Clinical Review    Admission: Date/Time/Statement:   Admission Orders (From admission, onward)       Ordered        06/09/25 1555  INPATIENT ADMISSION  Once            06/09/25 1529  Place in Observation  Once,   Status:  Canceled                          Orders Placed This Encounter   Procedures    INPATIENT ADMISSION     Standing Status:   Standing     Number of Occurrences:   1     Level of Care:   Med Surg [16]     Estimated length of stay:   More than 2 Midnights     Certification:   I certify that inpatient services are medically necessary for this patient for a duration of greater than two midnights. See H&P and MD Progress Notes for additional information about the patient's course of treatment.     ED Arrival Information       Expected   6/9/2025     Arrival   6/9/2025 13:36    Acuity   Emergent              Means of arrival   Ambulance    Escorted by   [facility]    Service   Hospitalist    Admission type   Emergency              Arrival complaint   Supratherapeutic INR             Chief Complaint   Patient presents with    Abnormal Lab     Pt arrives via EMS from [facility]. Staying there currently for PT. Was sent today for INR of 11 and Hgb of 6.5. No complaints besides pain in his legs that he is doing therapy for.       Initial Presentation: 89 y.o. male  with a PMH of hypothyroidism, A. Fib chronic anemia, who presents with elevated Vit K. Patient was sent from acute rehab by provider for anemia and elevated vitamin K. Vitamin K recently checked 1 week ago and was in appropriate therapeutic range. Hemoglobin PTA noted to be 6.5.  Patient does have a chronic history of anemia and is on iron supplements consistently. Patient recently hospitalized in late 05/2025 at [facility] for pneumonia.  Patient completed his cephalosporin course as an outpatient.  Patient is currently having no acute source of bleeding. Patient denies hematochezia, melena, bleeding from gums,  hematemesis or hemoptysis. Patient's only complaint is a persistent productive cough with some mild shortness of breath. Patient notes some mild orthopnea. Patient notes his respiratory status is not quite recovered since treatment for pneumonia at [facility]. Plan: Inpatient admission for evaluation and treatment of supratherapeutic INR, anemia, pneumonia, CHF, RSV, acute on chronic kidney injury, Afib, hypothyroidism: given 5 mg of Vit K in ED, trend INR, trend procal, CT chest, IV ceftriaxone, PO azithromycin, Mucinex, nebulizers, Echo, IV lasix 50 mg bid, continue metoprolol, Januvia, levothyroxine, low salt diet,     Anticipated Length of Stay/Certification Statement: Patient will be admitted on an inpatient basis with an anticipated length of stay of greater than 2 midnights secondary to pna, mild hypovolemia.     Date: 6/10   Day 2:     Internal medicine: Trend INR daily until therapeutic range. Check B12 and folate. Continue IV Rocephin. At this time given acute illness and hypotension, would temporarily hold further diuretics for for today. Trend creatinine. Continue metoprolol but decrease dose to 50 mg bid due to low BP. Coumadin on hold.     Date: 6/11  Day 3: Has surpassed a 2nd midnight with active treatments and services. INR today is back within therapeutic range at 2.28 and Coumadin will be resumed tonight at a lower dose with careful monitoring. Hgb at facility noted to be 6.5; on admit 7.8. Subsequently trended down to 6.8 on the morning of Peace 10 prompting us to provide 1 unit PRBC. Improved today to 8.7. B12 and folate levels pending. Continue IV Rocephin. 5 days prednisone burst and continue mucinex. Trend creatinine. Continue metoprolol at decreased dose. Continue SSI.       ED Treatment-Medication Administration from 06/09/2025 1252 to 06/10/2025 0722         Date/Time Order Dose Route Action     06/09/2025 1516 lactated ringers infusion 100 mL/hr Intravenous New Bag     06/09/2025 160  phytonadione (MEPHYTON) tablet 2.5 mg 2.5 mg Oral Given     06/09/2025 1603 phytonadione (MEPHYTON) tablet 2.5 mg 2.5 mg Oral Given     06/09/2025 1736 acetaminophen (TYLENOL) tablet 650 mg 650 mg Oral Given     06/10/2025 0624 acetaminophen (TYLENOL) tablet 650 mg 650 mg Oral Given     06/09/2025 1736 ascorbic acid (VITAMIN C) tablet 500 mg 500 mg Oral Given     06/09/2025 1736 ferrous sulfate tablet 325 mg 325 mg Oral Given     06/09/2025 1736 gabapentin (NEURONTIN) capsule 100 mg 100 mg Oral Given     06/09/2025 2147 gabapentin (NEURONTIN) capsule 100 mg 100 mg Oral Given     06/10/2025 0705 levothyroxine tablet 50 mcg 50 mcg Oral Given     06/09/2025 2147 melatonin tablet 3 mg 3 mg Oral Given     06/09/2025 2147 metoprolol tartrate (LOPRESSOR) tablet 75 mg 75 mg Oral Given     06/09/2025 1700 repaglinide (PRANDIN) tablet 0.5 mg -- Oral Held Dose     06/09/2025 2200 warfarin (COUMADIN) tablet 4 mg -- Oral Held Dose     06/09/2025 2147 guaiFENesin (MUCINEX) 12 hr tablet 600 mg 600 mg Oral Given     06/09/2025 1739 furosemide (LASIX) injection 50 mg 50 mg Intravenous Given     06/09/2025 1738 heparin (porcine) subcutaneous injection 5,000 Units 5,000 Units Subcutaneous Given     06/09/2025 2150 heparin (porcine) subcutaneous injection 5,000 Units 5,000 Units Subcutaneous Given     06/10/2025 0625 heparin (porcine) subcutaneous injection 5,000 Units 5,000 Units Subcutaneous Given     06/09/2025 2203 insulin lispro (HumALOG/ADMELOG) 100 units/mL subcutaneous injection 1-5 Units 1 Units Subcutaneous Given     06/09/2025 1745 ceftriaxone (ROCEPHIN) 1 g/50 mL in dextrose IVPB 1,000 mg Intravenous New Bag     06/09/2025 1744 lidocaine (LIDODERM) 5 % patch 1 patch 1 patch Topical Medication Applied     06/10/2025 0629 lidocaine (LIDODERM) 5 % patch 1 patch 1 patch Topical Patch Removed            Scheduled Medications:  allopurinol, 100 mg, Oral, Daily  ascorbic acid, 500 mg, Oral, BID  [Held by provider] bumetanide, 1  mg, Oral, Daily  cefTRIAXone, 1,000 mg, Intravenous, Q24H  ferrous sulfate, 325 mg, Oral, Every Other Day  gabapentin, 100 mg, Oral, TID  guaiFENesin, 600 mg, Oral, BID  insulin glargine, 10 Units, Subcutaneous, QAM  insulin lispro, 1-5 Units, Subcutaneous, TID AC  insulin lispro, 1-5 Units, Subcutaneous, HS  levothyroxine, 50 mcg, Oral, Early Morning  lidocaine, 1 patch, Topical, Daily  melatonin, 3 mg, Oral, HS  metoprolol tartrate, 50 mg, Oral, Q12H PATRICK  predniSONE, 40 mg, Oral, Daily  sitaGLIPtin, 25 mg, Oral, Daily  warfarin, 2 mg, Oral, Daily (warfarin)      Continuous IV Infusions:     PRN Meds:  acetaminophen, 650 mg, Oral, Q6H PRN  albuterol, 2 puff, Inhalation, Q4H PRN  albuterol, 2.5 mg, Nebulization, Q6H PRN  calcium carbonate, 1,000 mg, Oral, Daily PRN  docusate sodium, 100 mg, Oral, BID PRN  metoprolol, 5 mg, Intravenous, Q6H PRN  ondansetron, 4 mg, Intravenous, Q6H PRN      ED Triage Vitals   Temperature Pulse Respirations Blood Pressure SpO2 Pain Score   06/09/25 1340 06/09/25 1340 06/09/25 1340 06/09/25 1342 06/09/25 1340 06/09/25 2030   97.7 °F (36.5 °C) 76 18 110/66 97 % No Pain     Weight (last 2 days)       Date/Time Weight    06/11/25 0500 59 (130.07)    06/10/25 1600 59 (130)    06/10/25 1107 59 (130)    06/09/25 1343 59.1 (130.29)            Vital Signs (last 3 days)       Date/Time Temp Pulse Resp BP MAP (mmHg) SpO2 O2 Device Patient Position - Orthostatic VS [place] Coma Scale Score Pain    06/11/25 07:32:37 97.9 °F (36.6 °C) 77 -- 120/97 105 96 % -- -- -- --    06/10/25 2330 -- -- -- -- -- -- -- -- 15 No Pain    06/10/25 21:57:25 -- 98 18 125/74 91 96 % -- -- -- --    06/10/25 1700 97.6 °F (36.4 °C) 105 20 105/62 -- 93 % None (Room air) -- -- --    06/10/25 1600 -- 60 -- 97/62 -- -- -- -- -- --    06/10/25 15:10:56 98.3 °F (36.8 °C) -- -- 97/62 74 -- -- -- -- --    06/10/25 1500 98.3 °F (36.8 °C) 70 20 97/62 -- 92 % -- -- -- --    06/10/25 1418 97.3 °F (36.3 °C) 70 20 105/50 -- 92 % None  (Room air) -- -- --    06/10/25 1407 97.7 °F (36.5 °C) 76 20 93/53 -- 98 % None (Room air) -- -- --    06/10/25 1356 -- -- -- -- 66 -- -- Lying -- --    06/10/25 1200 -- -- -- -- -- -- -- -- -- Med Not Given for Pain - for MAR use only    06/10/25 1109 -- 81 -- 90/46 -- -- -- Sitting -- --    06/10/25 1101 -- -- -- -- -- -- -- -- -- No Pain    06/10/25 10:34:15 97.9 °F (36.6 °C) 91 20 99/48 65 93 % None (Room air) -- -- --    06/10/25 0820 -- -- -- -- -- -- -- -- 15 No Pain    06/10/25 0730 -- 64 -- 91/43 59 94 % -- -- -- --    06/10/25 0709 -- 97 18 140/75 94 94 % None (Room air) Lying -- --    06/10/25 0624 -- 68 -- 98/65 78 95 % -- -- -- --    06/10/25 0401 -- 73 -- 108/57 76 94 % -- -- -- --    06/10/25 0400 -- 72 -- -- -- 95 % -- -- -- No Pain    06/10/25 0015 -- 91 -- 119/55 79 95 % -- -- -- --    06/09/25 2213 -- -- -- -- -- -- -- -- 14 --    06/09/25 2147 -- 100 -- 113/60 -- -- -- -- -- --    06/09/25 2100 -- 90 18 113/65 84 98 % None (Room air) -- -- --    06/09/25 2030 -- -- -- -- -- -- -- -- -- No Pain    06/09/25 2000 -- 103 18 103/59 75 94 % -- -- -- --    06/09/25 1800 -- 102 18 127/86 97 98 % None (Room air) -- -- --    06/09/25 1700 -- 97 18 133/68 91 98 % None (Room air) -- -- --    06/09/25 1604 -- 99 18 130/69 -- 98 % None (Room air) -- -- --    06/09/25 1445 -- 99 18 144/77 97 95 % None (Room air) -- -- --    06/09/25 1342 -- -- -- 110/66 -- -- -- -- -- --    06/09/25 1340 97.7 °F (36.5 °C) 76 18 -- -- 97 % None (Room air) -- -- --              Pertinent Labs/Diagnostic Test Results:   Radiology:  CT chest wo contrast   Final Interpretation by Jaylon Hidalgo MD (06/10 1253)      Mild pulmonary edema likely secondary to CHF. Small pleural effusions.      Large hiatal/left diaphragmatic hernia.      The study was marked in EPIC for immediate notification.            Computerized Assisted Algorithm (CAA) may have aided analysis of applicable images.      Workstation performed:  [id number]         XR chest 1 view portable   Final Interpretation by Terry Shafer MD (06/09 1549)      Cardiac consolidation concerning for pneumonia.   Small bilateral pleural effusions   Prominent atherosclerotic disease of the thoracic aorta      The study was marked in EPIC for immediate notification.            Workstation performed: [id number]           Cardiology:  Echo complete w/ contrast if indicated   Final Result by Terry Nunez DO (06/11 0839)        Left Ventricle: Left ventricular cavity size is normal. Wall thickness    is normal. The left ventricular ejection fraction is 55%. Systolic    function is normal. Wall motion is normal.     Right Ventricle: Right ventricular cavity size is moderately dilated.    Systolic function is moderately reduced.     Left Atrium: The atrium is mildly dilated.     Right Atrium: The atrium is moderately dilated.     Aortic Valve: The aortic valve is trileaflet. The leaflets are    moderately thickened. The leaflets are mildly calcified. The leaflets    exhibit normal mobility.     Mitral Valve: There is mild regurgitation.     Tricuspid Valve: There is mild to moderate regurgitation. The estimated    right ventricular systolic pressure is 61.00 mmHg.         ECG 12 lead   Final Result by Jatinder Falk MD (06/10 1951)   Atrial fibrillation   Left anterior fascicular block   Nonspecific ST and T wave abnormality   Prolonged QT   Abnormal ECG   No previous ECGs available   Confirmed by Jatinder Falk ([id number]) on 6/10/2025 7:51:17 PM        GI:  No orders to display       Results from last 7 days   Lab Units 06/09/25  1605   SARS-COV-2  Negative     Results from last 7 days   Lab Units 06/11/25  0519 06/10/25  1916 06/10/25  0624 06/09/25  1346   WBC Thousand/uL 5.19  --  7.58 9.14   HEMOGLOBIN g/dL 8.7* 8.7* 6.8* 7.8*   HEMATOCRIT % 26.8* 27.2* 21.4* 23.8*   PLATELETS Thousands/uL 299  --  298 307   TOTAL NEUT ABS Thousands/µL 4.56  --   --   --    BANDS  PCT %  --   --   --  2         Results from last 7 days   Lab Units 06/11/25  0519 06/10/25  0624 06/09/25  1346   SODIUM mmol/L 137 138 135   POTASSIUM mmol/L 4.2 4.0 4.5   CHLORIDE mmol/L 103 101 99   CO2 mmol/L 22 29 27   ANION GAP mmol/L 12 8 9   BUN mg/dL 122* 123* 125*   CREATININE mg/dL 2.25* 2.34* 2.43*   EGFR ml/min/1.73sq m 24 23 22   CALCIUM mg/dL 8.9 8.7 9.3   MAGNESIUM mg/dL  --  2.4 2.4     Results from last 7 days   Lab Units 06/09/25  1346   AST U/L 31   ALT U/L 34   ALK PHOS U/L 140*   TOTAL PROTEIN g/dL 7.4   ALBUMIN g/dL 3.6   TOTAL BILIRUBIN mg/dL 0.57     Results from last 7 days   Lab Units 06/11/25  0741 06/10/25  2206 06/10/25  1656 06/10/25  1517 06/10/25  1200 06/10/25  0736 06/09/25  2159 06/09/25  1805   POC GLUCOSE mg/dl 233* 297* 223* 182* 144* 87 193* 133     Results from last 7 days   Lab Units 06/11/25  0519 06/10/25  0624 06/09/25  1346   GLUCOSE RANDOM mg/dL 232* 72 113         Results from last 7 days   Lab Units 06/11/25  0519 06/10/25  0624 06/09/25  1346   PROTIME seconds 25.8* 36.1* 89.1*   INR  2.28* 3.56* 11.83*         Results from last 7 days   Lab Units 06/10/25  0624 06/09/25  1346   PROCALCITONIN ng/ml 1.09* 1.42*                 Results from last 7 days   Lab Units 06/09/25  1346   BNP pg/mL 1,476*             Results from last 7 days   Lab Units 06/11/25  0547   UNIT PRODUCT CODE  [id number]   UNIT NUMBER  [id number]   UNITABO  O   UNITRH  NEG   CROSSMATCH  Compatible   UNIT DISPENSE STATUS  Presumed Trans   UNIT PRODUCT VOL ml 350         Results from last 7 days   Lab Units 06/09/25  1346   LIPASE u/L 35           Results from last 7 days   Lab Units 06/09/25  1716 06/09/25  1605   STREP PNEUMONIAE ANTIGEN, URINE  Negative  --    LEGIONELLA URINARY ANTIGEN  Negative  --    INFLUENZA A PCR   --  Negative   INFLUENZA B PCR   --  Negative   RSV PCR   --  Positive*         Past Medical History[1]  Present on Admission:   Supratherapeutic INR   Anemia   Atrial  fibrillation (HCC)   CKD (chronic kidney disease) stage 4, GFR 15-29 ml/min (HCC)      Admitting Diagnosis: Coagulopathy (HCC) [D68.9]  Abnormal laboratory test result [R89.9]  Chronic anemia [D64.9]  Acute-on-chronic kidney injury  (HCC) [N17.9, N18.9]  Acute renal failure superimposed on stage 4 chronic kidney disease, unspecified acute renal failure type (HCC) [N17.9, N18.4]  Age/Sex: 89 y.o. male    Network Utilization Review Department  ATTENTION: Please call with any questions or concerns to [phone] and carefully listen to the prompts so that you are directed to the right person. All voicemails are confidential.   For Discharge needs, contact Care Management DC Support Team at [phone] opt. [phone]  Send all requests for admission clinical reviews, approved or denied determinations and any other requests to dedicated fax number below belonging to the campus where the patient is receiving treatment. List of dedicated fax numbers for the Facilities:  FACILITY NAME UR FAX NUMBER   ADMISSION DENIALS (Administrative/Medical Necessity) [phone]   DISCHARGE SUPPORT TEAM (NETWORK) [phone]   PARENT CHILD HEALTH (Maternity/NICU/Pediatrics) [phone]   [facility] [phone]   [facility] [phone]   [facility] [phone]   [facility] [phone]   [facility] [phone]   [facility] [phone]   [facility] [phone]   [facility] [phone]   [facility] [phone]   Critical access hospital [phone]   [facility] [phone]   [facility] [phone]              [1]   Past Medical History:  Diagnosis Date    Anemia     CHF (congestive heart failure) (HCC)

## 2025-06-11 NOTE — ASSESSMENT & PLAN NOTE
Patient presented with persistent cough  Cannot determine if patient has RSV pneumonia versus aspiration pneumonia or some combination of both  CXR w/ consolidation concerning for pneumonia but CT chest with no evidence of consolidation  CT does show evidence of large hiatal hernia suggesting possible risk of bottom up aspiration-add Protonix  Given elevated Pro-Natalio at 1.42 and this advanced elderly gentleman with multiple comorbidities, reasonable to complete a short course of antibiotics.  Continue day #3 Rocephin

## 2025-06-11 NOTE — PROGRESS NOTES
Progress Note - Hospitalist   Name: Dmitriy Craven 89 y.o. male I MRN: [id number]  Unit/Bed#: S -01 I Date of Admission: 6/9/2025   Date of Service: 6/11/2025 I Hospital Day: 2    Assessment & Plan  Supratherapeutic INR  Recent Labs     06/09/25  1346 06/10/25  0624 06/11/25  0519   INR 11.83* 3.56* 2.28*   Sent in from nursing facility due to elevated INR and anemia.  INR on admit 11.83 (prior INR 2.2 on 06/02)  PTA Coumadin 4 mg q day held on admission  Given 5 mg of Vit K in ED   INR today is back within therapeutic range at 2.28 and Coumadin will be resumed tonight at a lower dose with careful monitoring  Anemia  Recent Labs     06/10/25  0624 06/10/25  1916 06/11/25  0519   HGB 6.8* 8.7* 8.7*   Patient with chronic anemia in the 7-8 range previously being followed by [facility] hematology for possible anemia of CKD +/- MDS  Hgb at facility noted to be 6.5; on admit 7.8.  Subsequently trended down to 6.8 on the morning of Peace 10 prompting us to provide 1 unit PRBC.  Improved today to 8.7  facility noted black stool but pt is on iron supplements. Heme pos stool.  Concern for significantly rising BUN.  Cannot rule out GI bleed.  Per my discussion with the patient he would prefer to avoid invasive scopes therefore would monitor conservatively for now  Pending results of B12 and folate    Pneumonia  Patient presented with persistent cough  Cannot determine if patient has RSV pneumonia versus aspiration pneumonia or some combination of both  CXR w/ consolidation concerning for pneumonia but CT chest with no evidence of consolidation  CT does show evidence of large hiatal hernia suggesting possible risk of bottom up aspiration-add Protonix  Given elevated Pro-Natalio at 1.42 and this advanced elderly gentleman with multiple comorbidities, reasonable to complete a short course of antibiotics.  Continue day #3 Rocephin     Chronic diastolic heart failure (HCC)  Wt Readings from Last 3 Encounters:   06/11/25 59 kg (130 lb  1.1 oz)   Acute heart failure ruled out  Prior 2D echocardiogram in 2023 with severe pulmonary hypertension and normal EF.  Repeat echo  patient on oral Bumex prior to arrival.  Initially received IV Lasix on admission for concern of decompensated heart failure  However, at this time given acute illness and hypotension, would temporarily hold further diuretics for for today and consider resuming tomorrow stable  RSV (acute bronchiolitis due to respiratory syncytial virus)  Positive for RSV, but fortunately remains clinically well  Supportive care--5 days prednisone burst and continue mucinex  Add respiratory protocol today for wheezing heard on exam  Continue flutter valve  Stable on room air  CKD (chronic kidney disease) stage 4, GFR 15-29 ml/min ([facility])  Lab Results   Component Value Date    EGFR 24 06/11/2025    EGFR 23 06/10/2025    EGFR 22 06/09/2025    CREATININE 2.25 (H) 06/11/2025    CREATININE 2.34 (H) 06/10/2025    CREATININE 2.43 (H) 06/09/2025     Baseline Cr. 1.9-2.3   Creatinine mildly elevated on admit at Cr. 2.43, but currently back within baseline  Continue to trend daily    Atrial fibrillation ([facility])  Rate controlled  C/w metoprolol but decrease dose to 50 mg BID due to low BP on Peace 10  Coumadin 4 mg daily which patient was on prior to arrival was held due to supratherapeutic INR but should now be resumed at a lower dose and monitored carefully   DM2 (diabetes mellitus, type 2) ([facility])  Lab Results   Component Value Date    HGBA1C 6.2 (H) 05/19/2025       Recent Labs     06/10/25  1517 06/10/25  1656 06/10/25  2206 06/11/25  0741   POCGLU 182* 223* 297* 233*     Blood Sugar Average: Last 72 hrs:  (P) 186.5  A1c at goal but patient demonstrating evidence of steroid-induced hyperglycemia  Continue sliding scale insulin and add basal coverage for now    VTE Pharmacologic Prophylaxis:   Resume Coumadin    Mobility:   Basic Mobility Inpatient Raw Score: 12  [facility] Goal: 4: Move to chair/commode  [facility]  "Achieved: 5: Stand (1 or more minutes)  -HLM Goal achieved. Continue to encourage appropriate mobility.    Patient Centered Rounds: Spoke with RN  Discussions with Specialists or Other Care Team Provider: Spoke with case management    Education and Discussions with Family / Patient: Attempted to update  (son) via phone. Left voicemail.     Current Length of Stay: 2 day(s)  Current Patient Status: Inpatient   Certification Statement: The patient will continue to require additional inpatient hospital stay due to pending insurance authorization, hyperglycemia, wheezing on exam  Discharge Plan: Anticipate discharge tomorrow to rehab facility.  Pending insurance authorization    Code Status: Level 3 - DNAR and DNI    Subjective   Patient is hearing impaired therefore I communicated with him with the assistance of the \"eye hear\" boubacar. Patient tells me he feels really good today.  States he thinks the blood transfusion really helped.  Does not note any black or bloody stools.  Does not feel he would likely want any invasive tests like colonoscopy at his age.  Still has some cough but is doing his flutter valve.  Not reporting any shortness of breath or pain.    Objective :  Temp:  [97.3 °F (36.3 °C)-98.3 °F (36.8 °C)] 97.9 °F (36.6 °C)  HR:  [] 77  BP: ()/(46-97) 120/97  Resp:  [18-20] 18  SpO2:  [92 %-98 %] 96 %  O2 Device: None (Room air)    Body mass index is 20.37 kg/m².     Input and Output Summary (last 24 hours):     Intake/Output Summary (Last 24 hours) at 6/11/2025 1038  Last data filed at 6/11/2025 0501  Gross per 24 hour   Intake 766 ml   Output 700 ml   Net 66 ml       Physical Exam  Vitals reviewed.   Constitutional:       General: He is not in acute distress.     Appearance: Normal appearance. He is not ill-appearing, toxic-appearing or diaphoretic.      Comments: Overall quite well-appearing despite advanced age.  Patient seen sitting up in bed in no apparent distress having " finished his breakfast   HENT:      Ears:      Comments: Hearing impaired     Nose: No congestion.     Eyes:      General: No scleral icterus.        Right eye: No discharge.         Left eye: No discharge.      Conjunctiva/sclera: Conjunctivae normal.       Cardiovascular:      Rate and Rhythm: Normal rate and regular rhythm.      Heart sounds: No murmur heard.  Pulmonary:      Effort: No respiratory distress.      Breath sounds: No stridor. Wheezing (Expiratory wheezes noted) and rhonchi (Rhonchorous breath sounds appreciated) present.      Comments: Not requiring any oxygen  Abdominal:      General: There is no distension.      Palpations: Abdomen is soft.      Tenderness: There is no abdominal tenderness. There is no guarding.     Musculoskeletal:      Right lower leg: No edema.      Left lower leg: No edema.     Skin:     General: Skin is warm and dry.      Coloration: Skin is not jaundiced or pale.      Findings: No bruising, erythema, lesion or rash.     Neurological:      Mental Status: He is alert. Mental status is at baseline.      Comments: Awake alert interactive, no confusion noted   Psychiatric:         Mood and Affect: Mood normal.         Thought Content: Thought content normal.           Lines/Drains:          Lab Results: I have reviewed the following results:   Results from last 7 days   Lab Units 06/11/25  0519 06/10/25  0624 06/09/25  1346   WBC Thousand/uL 5.19   < > 9.14   HEMOGLOBIN g/dL 8.7*   < > 7.8*   HEMATOCRIT % 26.8*   < > 23.8*   PLATELETS Thousands/uL 299   < > 307   BANDS PCT %  --   --  2   SEGS PCT % 88*  --   --    LYMPHO PCT % 9*  --  20   MONO PCT % 2*  --  8   EOS PCT % 0  --  2    < > = values in this interval not displayed.     Results from last 7 days   Lab Units 06/11/25  0519 06/10/25  0624 06/09/25  1346   SODIUM mmol/L 137   < > 135   POTASSIUM mmol/L 4.2   < > 4.5   CHLORIDE mmol/L 103   < > 99   CO2 mmol/L 22   < > 27   BUN mg/dL 122*   < > 125*   CREATININE mg/dL  2.25*   < > 2.43*   ANION GAP mmol/L 12   < > 9   CALCIUM mg/dL 8.9   < > 9.3   ALBUMIN g/dL  --   --  3.6   TOTAL BILIRUBIN mg/dL  --   --  0.57   ALK PHOS U/L  --   --  140*   ALT U/L  --   --  34   AST U/L  --   --  31   GLUCOSE RANDOM mg/dL 232*   < > 113    < > = values in this interval not displayed.     Results from last 7 days   Lab Units 06/11/25  0519   INR  2.28*     Results from last 7 days   Lab Units 06/11/25  0741 06/10/25  2206 06/10/25  1656 06/10/25  1517 06/10/25  1200 06/10/25  0736 06/09/25  2159 06/09/25  1805   POC GLUCOSE mg/dl 233* 297* 223* 182* 144* 87 193* 133         Results from last 7 days   Lab Units 06/10/25  0624 06/09/25  1346   PROCALCITONIN ng/ml 1.09* 1.42*       Recent Cultures (last 7 days):   Results from last 7 days   Lab Units 06/09/25  1716   LEGIONELLA URINARY ANTIGEN  Negative       CT chest  2D echocardiogram    Last 24 Hours Medication List:     Current Facility-Administered Medications:     acetaminophen (TYLENOL) tablet 650 mg, Q6H PRN    albuterol (PROVENTIL HFA,VENTOLIN HFA) inhaler 2 puff, Q4H PRN    albuterol inhalation solution 2.5 mg, Q6H PRN    allopurinol (ZYLOPRIM) tablet 100 mg, Daily    ascorbic acid (VITAMIN C) tablet 500 mg, BID    [Held by provider] bumetanide (BUMEX) tablet 1 mg, Daily    calcium carbonate (TUMS) chewable tablet 1,000 mg, Daily PRN    ceftriaxone (ROCEPHIN) 1 g/50 mL in dextrose IVPB, Q24H, Last Rate: 1,000 mg (06/10/25 1730)    docusate sodium (COLACE) capsule 100 mg, BID PRN    ferrous sulfate tablet 325 mg, Every Other Day    gabapentin (NEURONTIN) capsule 100 mg, TID    guaiFENesin (MUCINEX) 12 hr tablet 600 mg, BID    insulin glargine (LANTUS) subcutaneous injection 10 Units 0.1 mL, QAM    insulin lispro (HumALOG/ADMELOG) 100 units/mL subcutaneous injection 1-5 Units, TID AC **AND** Fingerstick Glucose (POCT), TID AC    insulin lispro (HumALOG/ADMELOG) 100 units/mL subcutaneous injection 1-5 Units, HS    levothyroxine tablet 50  mcg, Early Morning    lidocaine (LIDODERM) 5 % patch 1 patch, Daily    melatonin tablet 3 mg, HS    metoprolol (LOPRESSOR) injection 5 mg, Q6H PRN    metoprolol tartrate (LOPRESSOR) tablet 50 mg, Q12H PATRICK    ondansetron (ZOFRAN) injection 4 mg, Q6H PRN    predniSONE tablet 40 mg, Daily    sitaGLIPtin (JANUVIA) tablet 25 mg, Daily    warfarin (COUMADIN) tablet 2 mg, Daily (warfarin)    Administrative Statements   Today, Patient Was Seen By: Nicole Valdes PA-C      **Please Note: This note may have been constructed using a voice recognition system.**

## 2025-06-11 NOTE — SPEECH THERAPY NOTE
Speech Language/Pathology    Speech/Language Pathology Progress Note    Patient Name: Dmitriy Craven  Today's Date: 6/11/2025     Problem List  Principal Problem:    Supratherapeutic INR  Active Problems:    CKD (chronic kidney disease) stage 4, GFR 15-29 ml/min (HCC)    Atrial fibrillation (HCC)    Anemia    Pneumonia    Chronic diastolic heart failure (HCC)    RSV (acute bronchiolitis due to respiratory syncytial virus)    DM2 (diabetes mellitus, type 2) ([facility])       Past Medical History  Past Medical History[1]     Past Surgical History  Past Surgical History[2]      Subjective:  Pt seen upright in chair. Regular/thin liquid meal tray present. Pt with baseline congested cough not in relation to PO intake. No current c/o dysphagia.     Objective:  Pt seen with regular/thin meal tray consisting of broccoli, mashed potatoes, and meatloaf. Mildly prolonged rotary chew but functional. No oral residue. Mildly delayed swallow initiation but functional. No overt s/s of aspiration with meal tray. Stable O2 levels with intake and pt remains on RA.     Assessment:  Pt appears to be tolerating regular/thin liquid diet. No s/s of aspiration. Cough noted prior to intake- suspect from RSV diagnosis at this time.     Plan/Recommendations:  Recommend continuation of regular/thin. No further needs identified- please re consult with any concerns/changes.        [1]   Past Medical History:  Diagnosis Date    Anemia     CHF (congestive heart failure) ([facility])    [2] No past surgical history on file.

## 2025-06-11 NOTE — ASSESSMENT & PLAN NOTE
Recent Labs     06/09/25  1346 06/10/25  0624 06/11/25  0519   INR 11.83* 3.56* 2.28*   Sent in from nursing facility due to elevated INR and anemia.  INR on admit 11.83 (prior INR 2.2 on 06/02)  PTA Coumadin 4 mg q day held on admission  Given 5 mg of Vit K in ED   INR today is back within therapeutic range at 2.28 and Coumadin will be resumed tonight at a lower dose with careful monitoring

## 2025-06-11 NOTE — PLAN OF CARE
Problem: Potential for Falls  Goal: Patient will remain free of falls  Description: INTERVENTIONS:  - Educate patient/family on patient safety including physical limitations  - Instruct patient to call for assistance with activity   - Consider consulting OT/PT to assist with strengthening/mobility based on AM PAC & JH-HLM score  - Consult OT/PT to assist with strengthening/mobility   - Keep Call bell within reach  - Keep bed low and locked with side rails adjusted as appropriate  - Keep care items and personal belongings within reach  - Initiate and maintain comfort rounds  - Make Fall Risk Sign visible to staff  - Apply yellow socks and bracelet for high fall risk patients  - Consider moving patient to room near nurses station  Outcome: Progressing     Problem: Prexisting or High Potential for Compromised Skin Integrity  Goal: Skin integrity is maintained or improved  Description: INTERVENTIONS:  - Identify patients at risk for skin breakdown  - Assess and monitor skin integrity including under and around medical devices   - Assess and monitor nutrition and hydration status  - Monitor labs  - Assess for incontinence   - Turn and reposition patient  - Assist with mobility/ambulation  - Relieve pressure over ghazala prominences   - Avoid friction and shearing  - Provide appropriate hygiene as needed including keeping skin clean and dry  - Evaluate need for skin moisturizer/barrier cream  - Collaborate with interdisciplinary team  - Patient/family teaching  - Consider wound care consult    Assess:  - Review Ace scale daily  - Clean and moisturize skin   - Inspect skin when repositioning, toileting, and assisting with ADLS  - Assess under medical devices  - Assess extremities for adequate circulation and sensation     Bed Management:  - Have minimal linens on bed & keep smooth, unwrinkled  - Toileting:  - Offer bedside commode  - Assess for incontinence  - Use incontinent care products after each incontinent episode      Activity:  - Mobilize patient   Skin Care:  - Avoid use of baby powder, tape, friction and shearing, hot water or constrictive clothing  - Relieve pressure over bony prominences  - Do not massage red bony areas    Next Steps:  - Teach patient strategies to minimize risks   - Consider consults to  interdisciplinary teams  Outcome: Progressing

## 2025-06-11 NOTE — PLAN OF CARE
Problem: OCCUPATIONAL THERAPY ADULT  Goal: Performs self-care activities at highest level of function for planned discharge setting.  See evaluation for individualized goals.  Description: Treatment Interventions: ADL retraining, Functional transfer training, UE strengthening/ROM, Endurance training, Patient/family training, Continued evaluation, Energy conservation, Activityengagement, Compensatory technique education, Equipment evaluation/education          See flowsheet documentation for full assessment, interventions and recommendations.   Note: Limitation: Decreased ADL status, Decreased Safe judgement during ADL, Decreased cognition, Decreased endurance, Decreased high-level ADLs, Decreased self-care trans, Decreased UE strength     Assessment: Pt is a 89 y.o. male admitted to [facility] 6/9/2025 due to abormal lab values; diagnosed w/ supratherapeutic INR. PMHx includes spinal stenosis of lumbar, HTN, ambulatory dysfunction, and CHF. Pt provided blood transfusion 6/10/2025; HGB was 8.7 prior to session. Pt admitted from [facility], resides at [facility] at true baseline. Pt is (I) w/ ADLs, mod (I) w/ functional mobility, and requires (A) for IADLs, toileting, and bathing at baseline. Supports of occupational performance include supportive facility staff, accessible home layout, and access to DME/AE. Barriers impacting occupational performance includes advanced age and hard of hearing. Upon arrival of therapist pt was alert and agreeable to participate. Pt completed supine<>sit and sit<>stand w/ mod A. Pt completed LBD w/ max A. Pt completed stand pivot to chair w/ mod A progressing to min A. Pt demonstrates impaired balance+ pain, decreased endurance, activity+standing tolerance, and fatigue. Pt would benefit from OT services 3-5x/wk to address deficits stated above and ensure safe return to prior level of function. Recommend Level II rehab resource intensity upon medical  stability.     Rehab Resource Intensity Level, OT: II (Moderate Resource Intensity)

## 2025-06-11 NOTE — CASE MANAGEMENT
Case Management Discharge Planning Note    Patient name Dmitriy Craven  Location S /S -01 MRN [id number]  : 3/12/1932 Date 2025       Current Admission Date: 2025  Current Admission Diagnosis:Supratherapeutic INR   Patient Active Problem List    Diagnosis Date Noted    DM2 (diabetes mellitus, type 2) ([facility]) 2025    Supratherapeutic INR 2025    Anemia 2025    Pneumonia 2025    Chronic diastolic heart failure (HCC) 2025    RSV (acute bronchiolitis due to respiratory syncytial virus) 2025    Ambulatory dysfunction 2025    Hypothyroidism 2025    Pulmonary hypertension (HCC) 2025    Pleural effusion, left 2025    Pneumonia of left lower lobe due to infectious organism 2025    Rib pain on left side 2025    Spinal stenosis of lumbar region without neurogenic claudication 2025    CKD (chronic kidney disease) stage 4, GFR 15-29 ml/min ([facility]) 2025    Atrial fibrillation (HCC) 2025    Skin tear of right elbow without complication 2024    Skin tear of left elbow without complication 2024    Skin tear of right lower leg without complication 2024      LOS (days): 2  Geometric Mean LOS (GMLOS) (days): 4  Days to GMLOS:2.1     OBJECTIVE:  Risk of Unplanned Readmission Score: 20.43         Current admission status: Inpatient   Preferred Pharmacy:   UNKNOWN - FOLLOW UP PRIOR TO DISCHARGE TO E-PRESCRIBE  No address on file      Primary Care Provider: Jass Perera    Primary Insurance: [employer]  Secondary Insurance:     DISCHARGE DETAILS:    Discharge planning discussed with:: Pt at bedside and his son via phone  Freedom of Choice: Yes     CM contacted family/caregiver?: Yes (Pts son Dmitriy)  Were Treatment Team discharge recommendations reviewed with patient/caregiver?: Yes  Did patient/caregiver verbalize understanding of patient care needs?: Yes  Were patient/caregiver advised of the  risks associated with not following Treatment Team discharge recommendations?: Yes    Contacts  Patient Contacts: Son- Dmitriy  Relationship to Patient:: Family  Contact Method: Phone  Phone Number: [phone]  Reason/Outcome: Continuity of Care, Discharge Planning, Referral        Other Referral/Resources/Interventions Provided:  Referral Comments: Pt is recommended for STR. Pt and his son are requesting Pt return to [facility] to continue with rehab as [facility] cannot meet his needs in [facility]. Pts family is working towards getting the Pt into a higher LOC at [facility].  Insurance auth tasked to CM DC support.

## 2025-06-11 NOTE — ASSESSMENT & PLAN NOTE
Lab Results   Component Value Date    EGFR 24 06/11/2025    EGFR 23 06/10/2025    EGFR 22 06/09/2025    CREATININE 2.25 (H) 06/11/2025    CREATININE 2.34 (H) 06/10/2025    CREATININE 2.43 (H) 06/09/2025     Baseline Cr. 1.9-2.3   Creatinine mildly elevated on admit at Cr. 2.43, but currently back within baseline  Continue to trend daily

## 2025-06-11 NOTE — ASSESSMENT & PLAN NOTE
Rate controlled  C/w metoprolol but decrease dose to 50 mg BID due to low BP on Peace 10  Coumadin 4 mg daily which patient was on prior to arrival was held due to supratherapeutic INR but should now be resumed at a lower dose and monitored carefully

## 2025-06-12 ENCOUNTER — TELEPHONE (OUTPATIENT)
Dept: OTHER | Facility: OTHER | Age: OVER 89
End: 2025-06-12

## 2025-06-12 VITALS
SYSTOLIC BLOOD PRESSURE: 131 MMHG | OXYGEN SATURATION: 96 % | HEART RATE: 80 BPM | WEIGHT: 132.94 LBS | TEMPERATURE: 98.3 F | DIASTOLIC BLOOD PRESSURE: 75 MMHG | HEIGHT: 67 IN | RESPIRATION RATE: 17 BRPM | BODY MASS INDEX: 20.87 KG/M2

## 2025-06-12 LAB
ANION GAP SERPL CALCULATED.3IONS-SCNC: 10 MMOL/L (ref 4–13)
BASOPHILS # BLD AUTO: 0 THOUSANDS/ÂΜL (ref 0–0.1)
BASOPHILS NFR BLD AUTO: 0 % (ref 0–1)
BUN SERPL-MCNC: 108 MG/DL (ref 5–25)
CALCIUM SERPL-MCNC: 8.9 MG/DL (ref 8.4–10.2)
CHLORIDE SERPL-SCNC: 101 MMOL/L (ref 96–108)
CO2 SERPL-SCNC: 24 MMOL/L (ref 21–32)
CREAT SERPL-MCNC: 1.97 MG/DL (ref 0.6–1.3)
EOSINOPHIL # BLD AUTO: 0 THOUSAND/ÂΜL (ref 0–0.61)
EOSINOPHIL NFR BLD AUTO: 0 % (ref 0–6)
ERYTHROCYTE [DISTWIDTH] IN BLOOD BY AUTOMATED COUNT: 18.2 % (ref 11.6–15.1)
GFR SERPL CREATININE-BSD FRML MDRD: 28 ML/MIN/1.73SQ M
GLUCOSE SERPL-MCNC: 196 MG/DL (ref 65–140)
GLUCOSE SERPL-MCNC: 211 MG/DL (ref 65–140)
GLUCOSE SERPL-MCNC: 215 MG/DL (ref 65–140)
HCT VFR BLD AUTO: 26.9 % (ref 36.5–49.3)
HGB BLD-MCNC: 8.6 G/DL (ref 12–17)
IMM GRANULOCYTES # BLD AUTO: 0.06 THOUSAND/UL (ref 0–0.2)
IMM GRANULOCYTES NFR BLD AUTO: 1 % (ref 0–2)
INR PPP: 3.78 (ref 0.85–1.19)
LYMPHOCYTES # BLD AUTO: 0.5 THOUSANDS/ÂΜL (ref 0.6–4.47)
LYMPHOCYTES NFR BLD AUTO: 6 % (ref 14–44)
MCH RBC QN AUTO: 32.7 PG (ref 26.8–34.3)
MCHC RBC AUTO-ENTMCNC: 32 G/DL (ref 31.4–37.4)
MCV RBC AUTO: 102 FL (ref 82–98)
MONOCYTES # BLD AUTO: 0.64 THOUSAND/ÂΜL (ref 0.17–1.22)
MONOCYTES NFR BLD AUTO: 7 % (ref 4–12)
NEUTROPHILS # BLD AUTO: 7.57 THOUSANDS/ÂΜL (ref 1.85–7.62)
NEUTS SEG NFR BLD AUTO: 86 % (ref 43–75)
NRBC BLD AUTO-RTO: 0 /100 WBCS
PLATELET # BLD AUTO: 305 THOUSANDS/UL (ref 149–390)
PMV BLD AUTO: 10.6 FL (ref 8.9–12.7)
POTASSIUM SERPL-SCNC: 3.9 MMOL/L (ref 3.5–5.3)
PROTHROMBIN TIME: 37.7 SECONDS (ref 12.3–15)
RBC # BLD AUTO: 2.63 MILLION/UL (ref 3.88–5.62)
SODIUM SERPL-SCNC: 135 MMOL/L (ref 135–147)
WBC # BLD AUTO: 8.77 THOUSAND/UL (ref 4.31–10.16)

## 2025-06-12 PROCEDURE — 80048 BASIC METABOLIC PNL TOTAL CA: CPT | Performed by: PHYSICIAN ASSISTANT

## 2025-06-12 PROCEDURE — 85610 PROTHROMBIN TIME: CPT | Performed by: PHYSICIAN ASSISTANT

## 2025-06-12 PROCEDURE — 87081 CULTURE SCREEN ONLY: CPT | Performed by: INTERNAL MEDICINE

## 2025-06-12 PROCEDURE — 94668 MNPJ CHEST WALL SBSQ: CPT

## 2025-06-12 PROCEDURE — 85025 COMPLETE CBC W/AUTO DIFF WBC: CPT | Performed by: PHYSICIAN ASSISTANT

## 2025-06-12 PROCEDURE — 82948 REAGENT STRIP/BLOOD GLUCOSE: CPT

## 2025-06-12 PROCEDURE — 99239 HOSP IP/OBS DSCHRG MGMT >30: CPT | Performed by: PHYSICIAN ASSISTANT

## 2025-06-12 RX ORDER — PANTOPRAZOLE SODIUM 40 MG/1
40 TABLET, DELAYED RELEASE ORAL
Start: 2025-06-13 | End: 2025-06-21

## 2025-06-12 RX ORDER — ALBUTEROL SULFATE 90 UG/1
2 INHALANT RESPIRATORY (INHALATION) EVERY 4 HOURS PRN
Start: 2025-06-12

## 2025-06-12 RX ORDER — GUAIFENESIN 600 MG/1
600 TABLET, EXTENDED RELEASE ORAL 2 TIMES DAILY
Start: 2025-06-12 | End: 2025-06-18

## 2025-06-12 RX ORDER — METOPROLOL TARTRATE 50 MG
50 TABLET ORAL EVERY 12 HOURS SCHEDULED
Start: 2025-06-12

## 2025-06-12 RX ORDER — WARFARIN SODIUM 1 MG/1
1.5 TABLET ORAL
Start: 2025-06-12 | End: 2025-06-18

## 2025-06-12 RX ORDER — CEFDINIR 300 MG/1
300 CAPSULE ORAL EVERY 24 HOURS
Start: 2025-06-12 | End: 2025-06-14

## 2025-06-12 RX ORDER — PREDNISONE 20 MG/1
40 TABLET ORAL DAILY
Start: 2025-06-13 | End: 2025-06-15

## 2025-06-12 RX ADMIN — INSULIN LISPRO 1 UNITS: 100 INJECTION, SOLUTION INTRAVENOUS; SUBCUTANEOUS at 12:01

## 2025-06-12 RX ADMIN — PANTOPRAZOLE SODIUM 40 MG: 40 TABLET, DELAYED RELEASE ORAL at 05:43

## 2025-06-12 RX ADMIN — PREDNISONE 40 MG: 20 TABLET ORAL at 08:38

## 2025-06-12 RX ADMIN — GUAIFENESIN 600 MG: 600 TABLET ORAL at 08:37

## 2025-06-12 RX ADMIN — OXYCODONE HYDROCHLORIDE AND ACETAMINOPHEN 500 MG: 500 TABLET ORAL at 08:37

## 2025-06-12 RX ADMIN — INSULIN LISPRO 1 UNITS: 100 INJECTION, SOLUTION INTRAVENOUS; SUBCUTANEOUS at 08:35

## 2025-06-12 RX ADMIN — METOPROLOL TARTRATE 50 MG: 50 TABLET, FILM COATED ORAL at 08:37

## 2025-06-12 RX ADMIN — ALLOPURINOL 100 MG: 100 TABLET ORAL at 08:38

## 2025-06-12 RX ADMIN — SITAGLIPTIN 25 MG: 25 TABLET, FILM COATED ORAL at 08:38

## 2025-06-12 RX ADMIN — LIDOCAINE 1 PATCH: 50 PATCH CUTANEOUS at 08:49

## 2025-06-12 RX ADMIN — INSULIN GLARGINE 10 UNITS: 100 INJECTION, SOLUTION SUBCUTANEOUS at 08:36

## 2025-06-12 RX ADMIN — GABAPENTIN 100 MG: 100 CAPSULE ORAL at 08:38

## 2025-06-12 RX ADMIN — LEVOTHYROXINE SODIUM 50 MCG: 50 TABLET ORAL at 05:43

## 2025-06-12 NOTE — DISCHARGE SUMMARY
Discharge Summary - [facility] Medicine    Patient Information: Dmitriy Craven 89 y.o. male MRN: [id number]  Unit/Bed#: S -01 Encounter: [id number]    Discharging Physician / Practitioner: Verenice Nj PA-C  PCP: Jass Perera  Admission Date:   Admission Orders (From admission, onward)       Ordered        06/09/25 1555  INPATIENT ADMISSION  Once            06/09/25 1529  Place in Observation  Once,   Status:  Canceled                          Discharge Date: 06/12/25    Reason for Admission: elevated INR    Discharge Diagnoses:     Principal Problem:    Supratherapeutic INR  Active Problems:    CKD (chronic kidney disease) stage 4, GFR 15-29 ml/min (HCC)    Atrial fibrillation (HCC)    Anemia    Pneumonia    Chronic diastolic heart failure (HCC)    RSV (acute bronchiolitis due to respiratory syncytial virus)    DM2 (diabetes mellitus, type 2) (HCC)  Resolved Problems:    * No resolved hospital problems. *      Consultations During Hospital Stay:  none    Procedures Performed:     ECHO: EF 55%. Moderate dilated RA. Mild to mod regurg of tricuspid valve.     Significant Findings / Test Results:     CT chest: Mild pulmonary edema likely secondary to CHF. Small pleural effusions. Large hiatal/left diaphragmatic hernia.  CXR:  Cardiac consolidation concerning for pneumonia.Small bilateral pleural effusions. Prominent atherosclerotic disease of the thoracic aorta    Incidental Findings:   none     Test Results Pending at Discharge (will require follow up):   MRA culture     Outpatient Tests Requested:  INR and CBC 6/14/25    Complications:  none    Hospital Course:     Dmitriy Craven is a 89 y.o. male patient who originally presented to the hospital on 6/9/2025 due to elevated INR and anemia.  Patient was noted to be recently admitted at [facility] from May 19 through May 24, 2025 with increasing back pain for 3 weeks radiating to the legs.  Was seen by neurology and orthopedics.  Was started on  Tylenol, oxycodone as needed.  No surgical intervention was recommended.  Found to have community-acquired pneumonia with infiltrates in the left upper and lower lobes and was treated with antibiotics.  He follows with hematology oncology as an outpatient and his anemia was felt to be multifactorial related to iron deficiency, possible MDS and anemia of chronic disease.  Bone marrow biopsy was previously discussed however deferred in the outpatient setting.  During that admission his INR was noted to be 5.3.  His INR then increased to 9.2 and had not received Coumadin since admission.  He was given 5 mg of vitamin K at that time.  At that time they reached out to his facility at [facility] and his Coumadin dosing was 3 mg on Saturday and 2 mg on all other days.  He was discharged to rehab.     He was seen by his PCP at rehab on 5/27/2025 and at that point was restarted on Coumadin at 2.5 mg daily given his INR from 3 days prior to that was 1.9.  His goal was 2-3. Eventually he was changed to 4 mg QHS and then INR increased to 11 and hgb dropped to 6.5.    He was admitted for this INR and received 5 mg of PO vitamin K and was withheld from his coumadin. INR reduced to 3.56 and then to 2.28 and then got put back on coumadin and INR increased to 3.78. He had prior been stable on 2 mg on most days and 3 mg on Saturdays. Will reduce coumadin to 1.5 mg daily on discharge and recommend frequent INR monitoring until stable in 2-3 range.    The patient was noted to have a hemoglobin of 7.8  upon arrival however at the facility it had been listed as 6.5.  He was given 1 unit of packed red blood cells on 6/10/2025.  He wanted to avoid procedures and therefore was not noted to undergo GI workup despite heme positive stools. His hgb was stable after the 1 unit of blood for the following 3 days. As INR increases back with coumadin, need to monitor hgb closely as well and consider risk/benefits of use of coumadin with  "risk of bleeding.     The patient was reinitiated on antibiotics during this admission for presumed recurrent pneumonia despite recent admission for the same at [facility] given persistent symptoms and elevated procalcitonin despite negative CT of the chest.  He was noted to return positive for RSV.  Was started on steroids and has received 3 of 5 days of steroid burst. He will be discharged with 2 more days of steroids and did hve steroid induced hyperglycemia however given only needs 2 more days will resume on prandin + continue steroids. He received 3 days of IV ABX with ceftriaxone and will be discharged with 2 additional days of PO ABX on discharge.     Beta-blocker was reduced given low blood pressures. He was held from bumex but will be resumed on discharge - appears euvolemic despite CT chest suggesting mild pulm edema. BP has been 130-140s today/yesterday.     Condition at Discharge: stable     Discharge Day Visit / Exam:     Subjective:  feeling well. Wants to go back to rehab. No chest pain or SOB. No fever or chills.   Vitals: Blood Pressure: 131/75 (06/12/25 0711)  Pulse: 80 (06/11/25 2107)  Temperature: 98.3 °F (36.8 °C) (06/11/25 2218)  Temp Source: Oral (06/11/25 2218)  Respirations: 17 (06/12/25 0711)  Height: 5' 7\" (170.2 cm) (06/10/25 1600)  Weight - Scale: 60.3 kg (132 lb 15 oz) (06/12/25 0600)  SpO2: 96 % (06/11/25 1506)  Exam:   Physical Exam  Vitals and nursing note reviewed.   Constitutional:       General: He is not in acute distress.     Appearance: Normal appearance. He is not ill-appearing or diaphoretic.   HENT:      Head: Normocephalic and atraumatic.     Cardiovascular:      Rate and Rhythm: Normal rate and regular rhythm.   Pulmonary:      Effort: Pulmonary effort is normal.      Breath sounds: Normal breath sounds. No wheezing or rales.      Comments: RA. CTAB. No wheezes. Moist cough but lungs CTAB  Abdominal:      General: Bowel sounds are normal.      Palpations: Abdomen is " soft.      Tenderness: There is no abdominal tenderness. There is no guarding.     Musculoskeletal:      Right lower leg: No edema.      Left lower leg: No edema.     Skin:     General: Skin is warm and dry.      Findings: Bruising (arms) present.     Neurological:      Mental Status: He is alert.     Psychiatric:         Mood and Affect: Mood normal.         Behavior: Behavior normal.         Discussion with Family: son via phone    Discharge instructions/Information to patient and family:   See after visit summary for information provided to patient and family.      Provisions for Follow-Up Care:  See after visit summary for information related to follow-up care and any pertinent home health orders.      Disposition:     Other: SNF at [facility]    Planned Readmission: no     Discharge Statement:  I spent 55 minutes discharging the patient. This time was spent on the day of discharge. I had direct contact with the patient on the day of discharge. Greater than 50% of the total time was spent examining patient, answering all patient questions, arranging and discussing plan of care with patient as well as directly providing post-discharge instructions.  Additional time then spent on discharge activities.    Discharge Medications:  See after visit summary for reconciled discharge medications provided to patient and family.      ** Please Note: This note has been constructed using a voice recognition system **

## 2025-06-12 NOTE — PLAN OF CARE
Problem: Potential for Falls  Goal: Patient will remain free of falls  Description: INTERVENTIONS:  - Educate patient/family on patient safety including physical limitations  - Instruct patient to call for assistance with activity   - Consider consulting OT/PT to assist with strengthening/mobility based on AM PAC & -HLM score  - Consult OT/PT to assist with strengthening/mobility   - Keep Call bell within reach  - Keep bed low and locked with side rails adjusted as appropriate  - Keep care items and personal belongings within reach  - Initiate and maintain comfort rounds  - Make Fall Risk Sign visible to staff  - Offer Toileting every 2 Hours, in advance of need  - Initiate/Maintain alarm  - Obtain necessary fall risk management equipment:   - Apply yellow socks and bracelet for high fall risk patients  - Consider moving patient to room near nurses station  Outcome: Progressing     Problem: Prexisting or High Potential for Compromised Skin Integrity  Goal: Skin integrity is maintained or improved  Description: INTERVENTIONS:  - Identify patients at risk for skin breakdown  - Assess and monitor skin integrity including under and around medical devices   - Assess and monitor nutrition and hydration status  - Monitor labs  - Assess for incontinence   - Turn and reposition patient  - Assist with mobility/ambulation  - Relieve pressure over ghazala prominences   - Avoid friction and shearing  - Provide appropriate hygiene as needed including keeping skin clean and dry  - Evaluate need for skin moisturizer/barrier cream  - Collaborate with interdisciplinary team  - Patient/family teaching  - Consider wound care consult    Assess:  - Review Ace scale daily  - Clean and moisturize skin   - Inspect skin when repositioning, toileting, and assisting with ADLS  - Assess under medical devices  - Assess extremities for adequate circulation and sensation     Bed Management:  - Have minimal linens on bed & keep smooth,  unwrinkled  - Change linens as needed when moist or perspiring  - Avoid sitting or lying in one position for more than 2 hours while in bed?Keep HOB at 30 degrees   - Toileting:  - Offer bedside commode  - Assess for incontinence   - Use incontinent care products after each incontinent episode     Activity:  - Mobilize patient 3 times a day  - Encourage activity and walks on unit  - Encourage or provide ROM exercises   - Turn and reposition patient every 2 Hours  - Use appropriate equipment to lift or move patient in bed  - Instruct/ Assist with weight shifting when out of bed in chair  - Consider limitation of chair time 2 hour intervals    Skin Care:  - Avoid use of baby powder, tape, friction and shearing, hot water or constrictive clothing  - Relieve pressure over bony prominences  - Do not massage red bony areas    Next Steps:  - Teach patient strategies to minimize risks  - Consider consults to  interdisciplinary teams  Outcome: Progressing

## 2025-06-12 NOTE — CASE MANAGEMENT
Case Management Discharge Planning Note    Patient name Dmitriy Craven  Location S /S -01 MRN [id number]  : 3/12/1932 Date 2025       Current Admission Date: 2025  Current Admission Diagnosis:Supratherapeutic INR   Patient Active Problem List    Diagnosis Date Noted    DM2 (diabetes mellitus, type 2) ([facility]) 2025    Supratherapeutic INR 2025    Anemia 2025    Pneumonia 2025    Chronic diastolic heart failure (HCC) 2025    RSV (acute bronchiolitis due to respiratory syncytial virus) 2025    Ambulatory dysfunction 2025    Hypothyroidism 2025    Pulmonary hypertension (HCC) 2025    Pleural effusion, left 2025    Pneumonia of left lower lobe due to infectious organism 2025    Rib pain on left side 2025    Spinal stenosis of lumbar region without neurogenic claudication 2025    CKD (chronic kidney disease) stage 4, GFR 15-29 ml/min ([facility]) 2025    Atrial fibrillation ([facility]) 2025    Skin tear of right elbow without complication 2024    Skin tear of left elbow without complication 2024    Skin tear of right lower leg without complication 2024      LOS (days): 3  Geometric Mean LOS (GMLOS) (days): 3.7  Days to GMLOS:1     OBJECTIVE:  Risk of Unplanned Readmission Score: 20.67         Current admission status: Inpatient   Preferred Pharmacy:   UNKNOWN - FOLLOW UP PRIOR TO DISCHARGE TO E-PRESCRIBE  No address on file      Primary Care Provider: Jass Perera    Primary Insurance: BLUE CROSS [employer] REP  Secondary Insurance:     DISCHARGE DETAILS:  IMM Given (Date):: 25 (IMM reviewed with pt's son via phone call. Pt's son verbalized understanding of appeal rights. IMM placed in scan bin.)  IMM Given to:: Family  Family notified:: CM spoke with pt's son, Dmitriy, via phone call and informed of 12:30pm transport to [facility]. Dmitriy agreeable with discharge plan.  Additional Comments: Approved insurance  authorization received for [facility]. [facility] able to accept today. CM met with pt and informed of 12:30pm transport to [facility]. Pt agreeable. SLIM provider, nursing, and [facility] all aware of transport time.    Accepting Facility Name, City & State : [facility]  Receiving Facility/Agency Phone Number: [phone]  Facility/Agency Fax Number: Fax: [phone]

## 2025-06-12 NOTE — DISCHARGE SUPPORT
Case Management Assessment & Discharge Planning Note    Patient name Dmitriy Craven  Location S /S -01 MRN [id number]  : 3/12/1932 Date 2025       Current Admission Date: 2025  Current Admission Diagnosis:Supratherapeutic INR   Patient Active Problem List    Diagnosis Date Noted    DM2 (diabetes mellitus, type 2) ([facility]) 2025    Supratherapeutic INR 2025    Anemia 2025    Pneumonia 2025    Chronic diastolic heart failure (HCC) 2025    RSV (acute bronchiolitis due to respiratory syncytial virus) 2025    Ambulatory dysfunction 2025    Hypothyroidism 2025    Pulmonary hypertension (HCC) 2025    Pleural effusion, left 2025    Pneumonia of left lower lobe due to infectious organism 2025    Rib pain on left side 2025    Spinal stenosis of lumbar region without neurogenic claudication 2025    CKD (chronic kidney disease) stage 4, GFR 15-29 ml/min ([facility]) 2025    Atrial fibrillation (HCC) 2025    Skin tear of right elbow without complication 2024    Skin tear of left elbow without complication 2024    Skin tear of right lower leg without complication 2024      LOS (days): 3  Geometric Mean LOS (GMLOS) (days): 3.7  Days to GMLOS:1   Facility Authorization Approved  [facility] received approved auth for: SNF  Insurance: CBC  Determination made after peer to peer was completed?: Not applicable  Authorization Obtained Via: Phone  Name/Phone # of Rep who called in determination: Octavio  Facility Name: [facility]  Approved Authorization Number: [id number]  Start of Care Date: 25  Next Review Date: 25  Submit Next Review to: [phone]   notified: Shoshana BOSCH     Updates to authorization status will be noted in chart.    Please reach out to CM for updates on any clinical information.   Alert and oriented to person, place and time/Dressing clean and dry

## 2025-06-13 LAB — MRSA NOSE QL CULT: NORMAL

## 2025-06-13 NOTE — UTILIZATION REVIEW
NOTIFICATION OF ADMISSION DISCHARGE   This is a Notification of Discharge from [facility]. Please be advised that this patient has been discharge from our facility. Below you will find the admission and discharge date and time including the patient’s disposition.   UTILIZATION REVIEW CONTACT:  Utilization Review Assistants  Network Utilization Review Department  Phone: [phone] x carefully listen to the prompts. All voicemails are confidential.  Email: [email or web address]     ADMISSION INFORMATION  PRESENTATION DATE: 6/9/2025  1:36 PM  OBERVATION ADMISSION DATE: 06/09/2025 1529  INPATIENT ADMISSION DATE: 6/9/25  3:55 PM   DISCHARGE DATE: 6/12/2025  1:17 PM   DISPOSITION:Non [facility] SNF/TCU/SNU    Network Utilization Review Department  ATTENTION: Please call with any questions or concerns to [phone] and carefully listen to the prompts so that you are directed to the right person. All voicemails are confidential.   For Discharge needs, contact Care Management DC Support Team at [phone] opt. 2  Send all requests for admission clinical reviews, approved or denied determinations and any other requests to dedicated fax number below belonging to the campus where the patient is receiving treatment. List of dedicated fax numbers for the Facilities:  FACILITY NAME UR FAX NUMBER   ADMISSION DENIALS (Administrative/Medical Necessity) [phone]   DISCHARGE SUPPORT TEAM ([facility]) [phone]   PARENT CHILD HEALTH (Maternity/NICU/Pediatrics) [phone]   [facility] [phone]   [facility] [phone]   [facility] [phone]   [facility] [phone]   [facility] [phone]   [facility] [phone]   [facility] [phone]   [facility] [phone]    [facility] [phone]   [facility] [phone]   [facility] [phone]   [facility] [phone]

## 2025-06-14 ENCOUNTER — TELEPHONE (OUTPATIENT)
Dept: OTHER | Facility: OTHER | Age: OVER 89
End: 2025-06-14

## 2025-06-14 NOTE — TELEPHONE ENCOUNTER
"June from [facility] stated, \" I spoke with Dr. Saunders earlier and followed her instructions. The residents blood sugar is now 518.  "

## 2025-06-14 NOTE — TELEPHONE ENCOUNTER
Alejandra from [facility] called to report patient's blood sugar is 460. Patient is alert and not showing any symptoms.     On call provider paged.

## 2025-06-14 NOTE — TELEPHONE ENCOUNTER
"On call provider stated, \" Nobody answers in unit 2 where apparently he is. When they call again please connect me with the nurse .\"    A call was made to [facility] to confirm the correct phone number. June stated she had missed the call. June and Dr Garcia were connected via warm transfer.  "

## 2025-06-15 ENCOUNTER — NURSING HOME VISIT (OUTPATIENT)
Dept: GERIATRICS | Facility: OTHER | Age: OVER 89
End: 2025-06-15
Payer: COMMERCIAL

## 2025-06-15 DIAGNOSIS — E11.69 TYPE 2 DIABETES MELLITUS WITH OTHER SPECIFIED COMPLICATION, WITHOUT LONG-TERM CURRENT USE OF INSULIN (HCC): ICD-10-CM

## 2025-06-15 DIAGNOSIS — I50.32 CHRONIC DIASTOLIC HEART FAILURE (HCC): ICD-10-CM

## 2025-06-15 DIAGNOSIS — D64.9 ANEMIA, UNSPECIFIED TYPE: ICD-10-CM

## 2025-06-15 DIAGNOSIS — E03.9 HYPOTHYROIDISM, UNSPECIFIED TYPE: ICD-10-CM

## 2025-06-15 DIAGNOSIS — J21.0 RSV (ACUTE BRONCHIOLITIS DUE TO RESPIRATORY SYNCYTIAL VIRUS): ICD-10-CM

## 2025-06-15 DIAGNOSIS — I48.91 ATRIAL FIBRILLATION, UNSPECIFIED TYPE (HCC): ICD-10-CM

## 2025-06-15 DIAGNOSIS — R79.1 SUPRATHERAPEUTIC INR: ICD-10-CM

## 2025-06-15 DIAGNOSIS — R26.2 AMBULATORY DYSFUNCTION: Primary | ICD-10-CM

## 2025-06-15 PROCEDURE — 99309 SBSQ NF CARE MODERATE MDM 30: CPT | Performed by: FAMILY MEDICINE

## 2025-06-15 NOTE — PROGRESS NOTES
[facility]  [address] [address]  [phone]  [facility]  POS 32      NAME: Dmitriy Craven  AGE: 89 y.o. SEX: male [id number]    DATE OF ENCOUNTER: 6/21/2025    Assessment and Plan     1. Ambulatory dysfunction (Primary)  -Due to advanced age/frailty  - Uses walker  - PT/OT ordered  - Fall precautions in place  - cont Tylenol 1000 mg po q8h as needed    2. Anemia, unspecified type  -Sent to the hospital with hemoglobin of 6.5/7.2  - Received 1 unit of PRBC transfusion  -Continue ferrous sulfate 324 mg p.o. every other day  - Monitor H/H    3. Supratherapeutic INR  -Sent to the hospital with elevated INR- 11.0  - His INR is elevated with smaller doses, 1 mg  - Hemoccult positive  - May discontinue Coumadin  - Monitor for signs of bleeding    4. Atrial fibrillation, unspecified type (HCC)  -Rate controlled  - Discontinue Coumadin due to multiple supratherapeutic INRs and the risk of bleeding in the advanced age  - Continue metoprolol tartrate 50 mg p.o. twice daily    5. Chronic diastolic heart failure (HCC)  -Controlled  - Monitor weights  - Continue bumetanide 1 mg p.o. daily  - Continue metoprolol tartrate 50 mg p.o. twice daily    6. Type 2 diabetes mellitus with other specified complication, without long-term current use of insulin (HCC)  -Controlled  - HbA1c: 6.2 (5/19/25)  - Repaglinide 0.5 mg p.o. 3 times daily  -Continue sitagliptin 25 mg p.o. daily  - Continue gabapentin 100 mg p.o. 3 times daily  - monitor accuchecks as ordered    7. Hypothyroidism, unspecified type  -Stable  - Continue levothyroxine 50 mcg p.o. daily  - TSH with reflex T4 ordered    8. RSV (acute bronchiolitis due to respiratory syncytial virus)  -Improved  - Saturating well on room air  - Continue albuterol inhalation as needed      - Counseling Documentation: patient was counseled regarding: risk factor reductions and prognosis    Chief Complaint     Routine Long term follow-up visit.    History of Present  Illness     HPI  Dmitriy Craven, a 89 y/o male with PMH of DM2, Anemia, CKD4 was getting rehab at [facility]. He was sent to ED with abnormal lab values (INR of 11 and Hb of 6.5).  He got admitted to the hospital for 3 days, he found to be RSV positive and pneumonia, treated with abx (ceftriaxone ) and steroids for 5 days.  His hemoglobin was 7.8 at the hospital, he was given 1 unit of PRBC transfusion.  His overall condition improved, got discharged back to the facility.  He was seen and examined, stable.  He is not able to give much history due to dementia, but he is pleasant and cooperative with the visit.  Staff have no concerns at this time.    The following portions of the patient's history were reviewed and updated as appropriate: allergies, current medications, past family history, past medical history, past social history, past surgical history and problem list.    Review of Systems     Review of Systems   Unable to perform ROS: Dementia   Musculoskeletal:  Positive for gait problem.   Neurological:  Positive for weakness.   As in HPI.    Active Problem List   Problem List[1]    Objective     Vitals: T: 97.2, P: 62, R: 16, BP: 111/62, 94% on RA, Wt: 141.5 lbs    Physical Exam  Vitals and nursing note reviewed.   Constitutional:       General: He is not in acute distress.     Appearance: Normal appearance. He is well-developed. He is not diaphoretic.   HENT:      Head: Normocephalic and atraumatic.      Nose: Nose normal.      Mouth/Throat:      Mouth: Mucous membranes are dry.      Pharynx: Oropharynx is clear. No oropharyngeal exudate.     Eyes:      General: No scleral icterus.        Right eye: No discharge.         Left eye: No discharge.      Extraocular Movements: Extraocular movements intact.      Conjunctiva/sclera: Conjunctivae normal.       Cardiovascular:      Rate and Rhythm: Normal rate and regular rhythm.      Heart sounds: Normal heart sounds. No murmur heard.  Pulmonary:      Effort: Pulmonary effort  is normal. No respiratory distress.      Breath sounds: Normal breath sounds. No wheezing.   Chest:      Chest wall: No tenderness.   Abdominal:      General: Bowel sounds are normal. There is no distension.      Palpations: Abdomen is soft.      Tenderness: There is no abdominal tenderness. There is no guarding.     Musculoskeletal:         General: No tenderness or deformity. Normal range of motion.      Cervical back: Normal range of motion.      Right lower leg: No edema.      Left lower leg: No edema.     Skin:     General: Skin is warm and dry.     Neurological:      Mental Status: He is alert.      Cranial Nerves: No cranial nerve deficit.      Motor: No abnormal muscle tone.      Coordination: Coordination normal.      Comments: Oriented to self  Minimally verbal  Difficult to follow commands   Psychiatric:         Mood and Affect: Mood normal.         Behavior: Behavior normal.      Comments: Pleasantly confused         Pertinent Laboratory/Diagnostic Studies:  Refer to facility chart.    Current Medications   Medications reviewed and updated in facility chart.            [1]  Patient Active Problem List  Diagnosis   • Skin tear of right elbow without complication   • Skin tear of left elbow without complication   • Skin tear of right lower leg without complication   • Spinal stenosis of lumbar region without neurogenic claudication   • CKD (chronic kidney disease) stage 4, GFR 15-29 ml/min ([facility])   • Atrial fibrillation ([facility])   • Pulmonary hypertension ([facility])   • Pleural effusion, left   • Pneumonia of left lower lobe due to infectious organism   • Rib pain on left side   • Ambulatory dysfunction   • Hypothyroidism   • Supratherapeutic INR   • Anemia   • Pneumonia   • Chronic diastolic heart failure ([facility])   • RSV (acute bronchiolitis due to respiratory syncytial virus)   • DM2 (diabetes mellitus, type 2) ([facility])   • Pressure injury of left buttock, stage 3 ([facility])

## 2025-06-18 ENCOUNTER — NURSING HOME VISIT (OUTPATIENT)
Dept: WOUND CARE | Facility: HOSPITAL | Age: OVER 89
End: 2025-06-18
Payer: COMMERCIAL

## 2025-06-18 ENCOUNTER — NURSING HOME VISIT (OUTPATIENT)
Dept: GERIATRICS | Facility: OTHER | Age: OVER 89
End: 2025-06-18
Payer: COMMERCIAL

## 2025-06-18 VITALS
DIASTOLIC BLOOD PRESSURE: 53 MMHG | HEART RATE: 58 BPM | OXYGEN SATURATION: 98 % | SYSTOLIC BLOOD PRESSURE: 114 MMHG | RESPIRATION RATE: 18 BRPM | TEMPERATURE: 98 F

## 2025-06-18 DIAGNOSIS — R79.1 SUPRATHERAPEUTIC INR: ICD-10-CM

## 2025-06-18 DIAGNOSIS — R26.2 AMBULATORY DYSFUNCTION: ICD-10-CM

## 2025-06-18 DIAGNOSIS — L89.323 PRESSURE INJURY OF LEFT BUTTOCK, STAGE 3 (HCC): Primary | ICD-10-CM

## 2025-06-18 DIAGNOSIS — E03.9 HYPOTHYROIDISM, UNSPECIFIED TYPE: ICD-10-CM

## 2025-06-18 DIAGNOSIS — J21.0 RSV (ACUTE BRONCHIOLITIS DUE TO RESPIRATORY SYNCYTIAL VIRUS): Primary | ICD-10-CM

## 2025-06-18 DIAGNOSIS — D64.9 ANEMIA, UNSPECIFIED TYPE: ICD-10-CM

## 2025-06-18 DIAGNOSIS — I48.91 ATRIAL FIBRILLATION, UNSPECIFIED TYPE (HCC): ICD-10-CM

## 2025-06-18 PROCEDURE — 99309 SBSQ NF CARE MODERATE MDM 30: CPT

## 2025-06-18 PROCEDURE — 99305 1ST NF CARE MODERATE MDM 35: CPT | Performed by: NURSE PRACTITIONER

## 2025-06-18 RX ORDER — LEVOTHYROXINE SODIUM 50 UG/1
50 TABLET ORAL DAILY
Start: 2025-06-18

## 2025-06-18 NOTE — PROGRESS NOTES
[facility]  [address] [address] [phone]  [facility]  Code 31 (STR)          NAME: Dmitriy Craven  AGE: 89 y.o. SEX: male CODE STATUS: CPR    DATE OF ENCOUNTER: 6/18/2025    Assessment and Plan     1. RSV (acute bronchiolitis due to respiratory syncytial virus)  Assessment & Plan:  Tested positive during last hospital stay  Treated with antibiotics and steroids  Doing well today, denies respiratory symptoms  Continue isolation per facility protocol  98% on RA  2. Atrial fibrillation, unspecified type (HCC)  Assessment & Plan:  HR controlled, 71  Denies cp/palpitations  Continue metoprolol 50 mg BID  Coumadin discontinued d/t advanced age, high risk of bleeding and supra therapeutic INR's despite being on lowest doses  3. Hypothyroidism, unspecified type  Assessment & Plan:  Lab Results   Component Value Date    EBS2ANAXELTA 3.180 01/08/2025    TSH 6.27 (H) 05/02/2024    Continue levothyroxine 50 mcg daily  Orders:  -     levothyroxine 50 mcg tablet; Take 1 tablet (50 mcg total) by mouth daily  4. Supratherapeutic INR  Assessment & Plan:  Recently hospitalized with INR of 11  Patient returned to facility on 1-1.5 mg of coumadin  INR on 6/13/25 was 5.8  Coumadin held and INR repeated on 6/16/25, for 2.0  Coumadin discontinued as the risks for bleeding outweigh the benefit.  5. Ambulatory dysfunction  Assessment & Plan:  Multifactorial in the setting of advanced age, chronic conditions  Continue PT/OT  Fall Precautions  Ensure adequate nutrition/hydration   Monitor CBC/BMP    following for d/c planning    6. Anemia, unspecified type  Assessment & Plan:  Positive heme occult  Received 1 unit PRBC's  Patient had declined scopes, continue conservative measures  Monitor CBC         All medications and routine orders were reviewed and updated as needed.    Chief Complaint     STR follow up visit  Patient's care was coordinated with nursing facility staff. Recent  vitals, labs, and updated medications were review on [facility] system in facility.  Past Medical and Surgical History      Past Medical History[1]  Past Surgical History[2]  No Known Allergies       History of Present Illness     HPI  Dmitriy Craven is a 89 year old male, he is a STR patient of [facility] since 5/24/25. Past Medical Hx including but not limited to HTN, Afib, CKD. He was seen in collaboration with nursing for medical mgmt and STR follow up.     Hospital Course  Patient was admitted to the hospital 5/19/25 from [facility] for c/o back pain. He was found to pneumonia and improved with antibiotics. He was discharged on cefpodoxime to complete course. Patient was recommended fro rehab and was discharged to [facility].    6/9/25-6/12/25  Patient was sent to hospital for elevated INR of 11 and HGB of 6.5. He received Vitamin K, 1 unit PRBC's. Stool was heme positive, patient did not want to have any GI procedures. Additionally patient was positive for RSV, had respiratory symptom and was given antibiotics and steroids. Patient was discharged back to rehab.     Rehab Course  Dmitriy was seen and examined at bedside today. Patient is awake and alert, appears to be doing well. On exam he is in his wheelchair in his room, in no distress. He denies respiratory symptoms, LSC, 98% on RA. Despite low dose of Coumadin ( 1 mg), patient's INR 6/13/25 was 5.8. At that point coumadin was held over the weekend and INR was done 6/16/25 which was 2.0. Patient was evaluated by attending physician who discontinue further coumadin dosing as the risks for bleeding out weighs the benefits. Patient is on isolation per facility protocol. He will continue with therapy.    Denies CP/SOB/N/V/D. Denies lightheadedness, dizziness, headaches, vision changes. Patient states they are eating well and staying hydrated. Denies any bowel or bladder issues. Per review of SNF records, Patient is eating 3  meals per day, consuming  %. Last documented BM 6/16/25. No concerns from nursing at this time.    The patient's allergies, past medical, surgical, social and family history were reviewed and unchanged.    Review of Systems     Review of Systems   Constitutional:  Positive for activity change and fatigue. Negative for appetite change and fever.   HENT:  Negative for congestion.    Respiratory:  Negative for cough, shortness of breath and wheezing.    Cardiovascular:  Negative for chest pain and palpitations.   Gastrointestinal:  Negative for constipation, diarrhea, nausea and vomiting.   Genitourinary:  Negative for difficulty urinating and dysuria.   Musculoskeletal:  Positive for gait problem.   Skin: Negative.    Neurological:  Positive for weakness.   Psychiatric/Behavioral:  Negative for confusion and sleep disturbance.          Objective     Vitals:   Vitals:    06/18/25 1423   BP: 114/53   Pulse: 58   Resp: 18   Temp: 98 °F (36.7 °C)   SpO2: 98%           Physical Exam  Vitals and nursing note reviewed.   Constitutional:       General: He is not in acute distress.     Appearance: Normal appearance. He is not ill-appearing.   HENT:      Head: Normocephalic and atraumatic.     Eyes:      Conjunctiva/sclera: Conjunctivae normal.       Cardiovascular:      Rate and Rhythm: Normal rate and regular rhythm.      Heart sounds: Normal heart sounds.   Pulmonary:      Effort: Pulmonary effort is normal. No respiratory distress.      Breath sounds: Normal breath sounds. No wheezing.   Abdominal:      General: There is no distension.      Tenderness: There is no abdominal tenderness.     Skin:     General: Skin is warm.     Neurological:      Mental Status: He is alert and oriented to person, place, and time.      Motor: Weakness present.      Gait: Gait abnormal.     Psychiatric:         Mood and Affect: Mood normal.         Behavior: Behavior normal.         Pertinent Laboratory/Diagnostic Studies:   Reviewed in  "facility chart-stable    6/13/25  CBC NO DIFF         HEMOGLOBIN 8.0  L 12.5 - 17.0 g/dL       HEMATOCRIT 24.4  L 37.0 - 48.0 %       WBC 11.2  H 4.0 - 10.5 thou/cmm       RBC 2.40  L 4.00 - 5.40 mill/cmm       PLATELET COUNT 276 140 - 350 thou/cmm       MPV 8.8 7.5 - 11.3 fL         H 80 - 100 fL       MCH 33.4 27.0 - 36.0 pg       MCHC 32.9 32.0 - 37.0 g/dL       RDW 18.9  H 12.0 - 16.0 %         PT WITH INR       PT 50.4  H 12.0 - 14.6 sec       PT INR 5.8  Critical High            Interpretation          Suggested INR ranges for various          clinical conditions:                                       INR          Ambulatory Surgery          <1.5          Coumadinized Patients          (DVT,PE,MI or A.Fib)     2.0-3.0          Mechanical Heart Valve   2.5-3.5          Cardiogenic Embolus      2.5-3.5          Results verified        BASIC METABOLIC PNL       GLUCOSE 279  H 65 - 99 mg/dL         H 7 - 28 mg/dL       CREATININE 2.05  H 0.53 - 1.30 mg/dL       SODIUM 138 135 - 145 mmol/L       POTASSIUM 4.5 3.5 - 5.2 mmol/L       CHLORIDE 101 100 - 109 mmol/L       CARBON DIOXIDE 26 21 - 31 mmol/L       CALCIUM 8.5 8.5 - 10.5 mg/dL       ANION GAP 11 3 - 11        eGFRcr 30  L >59       Medications reviewed and updated see facility MAR for details.    Current Medications[3]     Please note:  Voice-recognition software may have been used in the preparation of this document.  Occasional wrong word or \"sound-alike\" substitutions may have occurred due to the inherent limitations of voice recognition software.  Interpretation should be guided by context.         HAILEY Haque  6/18/2025  2:58 PM         [1]   Past Medical History:  Diagnosis Date    Anemia     CHF (congestive heart failure) (HCC)    [2] No past surgical history on file.  [3]   Current Outpatient Medications:     acetaminophen (TYLENOL) 500 mg tablet, Take 1,000 mg by mouth every 8 (eight) hours as needed for mild pain, " Disp: , Rfl:     albuterol (PROVENTIL HFA,VENTOLIN HFA) 90 mcg/act inhaler, Inhale 2 puffs every 4 (four) hours as needed for wheezing or shortness of breath, Disp: , Rfl:     allopurinol (ZYLOPRIM) 100 mg tablet, Take 100 mg by mouth daily, Disp: , Rfl:     ascorbic acid (VITAMIN C) 500 MG tablet, Take 500 mg by mouth in the morning and 500 mg in the evening., Disp: , Rfl:     bumetanide (BUMEX) 1 mg tablet, Take 1 mg by mouth daily, Disp: , Rfl:     ferrous sulfate 325 (65 FE) MG EC tablet, Take 325 mg by mouth every other day, Disp: , Rfl:     gabapentin (NEURONTIN) 100 mg capsule, Take 100 mg by mouth 3 (three) times a day, Disp: , Rfl:     levothyroxine 50 mcg tablet, Take 1 tablet (50 mcg total) by mouth daily, Disp: , Rfl:     Lidocaine 4 % PTCH, Apply 1 patch topically in the morning. Apply to back daily, remove after 12 hours., Disp: , Rfl:     melatonin 3 mg, Take 3 mg by mouth daily at bedtime, Disp: , Rfl:     methocarbamol (ROBAXIN) 500 mg tablet, Take 500 mg by mouth every 8 (eight) hours as needed for muscle spasms, Disp: , Rfl:     metoprolol tartrate (LOPRESSOR) 50 mg tablet, Take 1 tablet (50 mg total) by mouth every 12 (twelve) hours, Disp: , Rfl:     repaglinide (PRANDIN) 0.5 mg tablet, Take 0.5 mg by mouth 3 (three) times a day before meals, Disp: , Rfl:     sitaGLIPtin (JANUVIA) 25 mg tablet, Take 25 mg by mouth daily, Disp: , Rfl:     pantoprazole (PROTONIX) 40 mg tablet, Take 1 tablet (40 mg total) by mouth daily in the early morning, Disp: , Rfl:

## 2025-06-18 NOTE — ASSESSMENT & PLAN NOTE
Positive heme occult  Received 1 unit PRBC's  Patient had declined scopes, continue conservative measures  Monitor CBC

## 2025-06-18 NOTE — ASSESSMENT & PLAN NOTE
Tested positive during last hospital stay  Treated with antibiotics and steroids  Doing well today, denies respiratory symptoms  Continue isolation per facility protocol  98% on RA

## 2025-06-18 NOTE — ASSESSMENT & PLAN NOTE
HR controlled, 71  Denies cp/palpitations  Continue metoprolol 50 mg BID  Coumadin discontinued d/t advanced age, high risk of bleeding and supra therapeutic INR's despite being on lowest doses

## 2025-06-18 NOTE — ASSESSMENT & PLAN NOTE
Recently hospitalized with INR of 11  Patient returned to facility on 1-1.5 mg of coumadin  INR on 6/13/25 was 5.8  Coumadin held and INR repeated on 6/16/25, for 2.0  Coumadin discontinued as the risks for bleeding outweigh the benefit.

## 2025-06-18 NOTE — ASSESSMENT & PLAN NOTE
Lab Results   Component Value Date    [id number] 3.180 01/08/2025    TSH 6.27 (H) 05/02/2024    Continue levothyroxine 50 mcg daily

## 2025-06-19 PROBLEM — L89.323 PRESSURE INJURY OF LEFT BUTTOCK, STAGE 3 (HCC): Status: ACTIVE | Noted: 2025-06-19

## 2025-06-19 NOTE — PROGRESS NOTES
Nursing Home Visit - Pulmonary   Dmitriy Craven 89 y.o. male MRN: [id number]  Unit/Bed#:  Encounter: [id number]    Assessment:  Pulmonary hypertension secondary to cardiac disease most likely  Chronic diastolic CHF  Bilateral pleural effusions, left more than right, secondary to CHF  Recent RSV tracheobronchitis with possible superimposed bacterial pneumonia treated with antibiotics  Anemia  CKD 3    Plan:  Continue oxygen if needed to maintain pulse ox more than 88%  Continue diuretics and follow-up with cardiology  Continue Coumadin and follow INR and also with cardiology  No further intervention from pulmonary standpoint given his large hiatal hernia he is at risk of aspiration.    ----------------------------------------------------------------------------------------------------------------------    HPI/Interval History:   89-year-old male with history of pulmonary hypertension ,chronic diastolic CHF and A-fib on Coumadin, was hospitalized recently with high INR and anemia.  He follows with hematology at [facility] for his chronic anemia.  During hospitalization he was found to have RSV bronchitis complicated with possible left lung pneumonia treated with antibiotics.  He has very large left-sided hiatal hernia/diaphragmatic herniation that appears on chest x-ray as a retrocardiac opacity .  Patient also has CKD, DM and other medical conditions.    Patient feels much better, denies any cough or shortness of breath, denies chest pain.  He is wheelchair-bound and only can stand for few steps.  He feels at baseline.  He quit smoking more than 70 years ago.  No history of COPD or asthma.      Vitals:   Reviewed vital signs in chart at nursing facility, stable    Physical Exam:   Gen: Alert and without respiratory distress  HEENT: PERRL. O/P: clear. no erythema or exudate.  Neck: Supple. There is no JVD, no LAD or thyromegaly appreciated. Trachea is midline.  Chest: Equal breath sounds and clear to auscultation bilaterally,  "no wheezing or crackles  Cardiac: S1-S2 regular, no murmurs  Abdomen: Soft and nontender. Bowel sounds are present.  Extremities: No edema  Neuro:  Awake alert and oriented, no focal deficits      Pertinent studies:  Echocardiogram: LVEF 55%, moderately dilated RV and moderately reduced systolic function, mildly dilated LA, mildly dilated RA, estimated peak PA pressure 61      Chest x-ray reviewed in PACS: Bibasilar atelectasis and possible small effusions, retrocardiac opacity most likely hiatal hernia.    Chest CT scan reviewed in PACS: Mild interstitial edema, small bilateral pleural effusions with compressive atelectasis at the bases, large left hiatal/diaphragmatic hernia.    Labs reviewed: Creatinine 1.97, CO2 24, hemoglobin 8.6      Orlink Brenton Garcia MD    Portions of the record may have been created with voice recognition software. Occasional wrong word or \"sound a like\" substitutions may have occurred due to the inherent limitations of voice recognition software. Read the chart carefully and recognize, using context, where substitutions have occurred.  "

## 2025-06-19 NOTE — PROGRESS NOTES
[facility] WOUND CARE MANAGEMENT   AND HYPERBARIC MEDICINE CENTER       Patient ID: Dmitriy Craven is a 89 y.o. male Date of Birth 3/12/1932     Location of Service: [facility]    Performed wound round with: Wound team     Chief Complaint : Left buttock    Wound Instructions:  Wound: Left buttock  Discontinue previous wound order  Cleanse the wound bed with NSS   Apply non-sting skin prep to periwound area  Apply Triad paste to wound bed  Frequency : daily and prn for soiling  Offload all wounds  Turn and reposition frequently  Instruct / Assist with weight shifting in wheelchair  Increase protein intake.  Monitor for any sign of infection or worsening, inform PCP or patient's primary physician in your facility.      Allergies  Patient has no known allergies.      Assessment & Plan:  1. Pressure injury of left buttock, stage 3 (HCC)  Assessment & Plan:  Left buttock  Full thickness, with no obvious sign of infection  Local wound care with Triad paste  Continue to offload, on air mattress  Increase protein intake, patient currently on Glucerna terra shake  Follow-up next week  2. Ambulatory dysfunction  Assessment & Plan:  on short-term rehab           Subjective:   June 18, 2025.  New consult for wound on the left buttock.  Medical problem includes but not limited to chronic kidney disease, A-fib, type 2 diabetes.  I introduced myself to patient and patient agreed to be seen for wound consult.  Patient was seen with the facility wound team.    Wound history: As per report, patient was admitted with a stage III pressure ulcer on admission    Received patient in bed, seems comfortable.  Denies pain.  Needs assistance with turning and repositioning in bed.  Patient is incontinent of bladder.  Have a good appetite.        Review of Systems   Constitutional: Negative.    Respiratory: Negative.     Cardiovascular: Negative.    Musculoskeletal:  Positive for gait problem.   Skin:  Positive for wound.  "      Objective:    Physical Exam  Constitutional:       Appearance: Normal appearance.     Cardiovascular:      Rate and Rhythm: Normal rate.   Pulmonary:      Effort: Pulmonary effort is normal.   Genitourinary:     Comments: Incontinent    Skin:     Findings: Lesion present.      Comments: Left buttock: Wound size is 1.16 x 1.41 x 0.1 cm.,  100% granulation, periwound normal, no drainage, no obvious sign of infection     Neurological:      Mental Status: He is alert.              Procedures           Patient's care was coordinated with nursing facility staff. Recent vitals, labs and updated medications were reviewed on EMR or chart system of facility. Past Medical, surgical, social, medication and allergy history and patient's previous records were reviewed and updated as appropriate: Most up-to date information is available in the facility EMR where the patient is currently admitted.    Problem List[1]  Past Medical History[2]  Past Surgical History[3]  Social History[4]   Current Medications[5]  Family History[6]           Coordination of Care: Wound team aware of the treatment plan. Facility nurse will provide wound treatment and monitor the wound for any changes.     Patient / Staff education : Patient / Staff was given education on sign of infection and pressure ulcer prevention. Patient/ Staff verbalized understanding     Follow up :  Next week    Voice-recognition software may have been used in the preparation of this document. Occasional wrong word or \"sound-alike\" substitutions may have occurred due to the inherent limitations of voice recognition software. Interpretation should be guided by context.      HAILEY Thurman       [1]   Patient Active Problem List  Diagnosis    Skin tear of right elbow without complication    Skin tear of left elbow without complication    Skin tear of right lower leg without complication    Spinal stenosis of lumbar region without neurogenic claudication    CKD " (chronic kidney disease) stage 4, GFR 15-29 ml/min (HCC)    Atrial fibrillation (HCC)    Pulmonary hypertension (HCC)    Pleural effusion, left    Pneumonia of left lower lobe due to infectious organism    Rib pain on left side    Ambulatory dysfunction    Hypothyroidism    Supratherapeutic INR    Anemia    Pneumonia    Chronic diastolic heart failure (HCC)    RSV (acute bronchiolitis due to respiratory syncytial virus)    DM2 (diabetes mellitus, type 2) (HCC)    Pressure injury of left buttock, stage 3 (HCC)   [2]   Past Medical History:  Diagnosis Date    Anemia     CHF (congestive heart failure) (HCC)    [3] No past surgical history on file.  [4]   Social History  Socioeconomic History    Marital status:    Tobacco Use    Smoking status: Former     Types: Cigarettes    Smokeless tobacco: Never   Substance and Sexual Activity    Alcohol use: Not Currently    Drug use: Never     Social Drivers of Health     Financial Resource Strain: Not At Risk (5/19/2025)    Received from [facility]    Financial Insecurity     In the last 12 months did you skip medications to save money?: No     In the last 12 months was there a time when you needed to see a doctor but could not because of cost?: No   Food Insecurity: No Food Insecurity (6/9/2025)    Nursing - Inadequate Food Risk Classification     Ran Out of Food in the Last Year: Never true   Transportation Needs: No Transportation Needs (6/9/2025)    Nursing - Transportation Risk Classification     Lack of Transportation: No   Social Connections: Socially Integrated (5/19/2025)    Received from [facility]    Social Connection     Do you often feel lonely?: No   Intimate Partner Violence: Unknown (6/9/2025)    Nursing IPS     Physically Hurt by Someone: No     Hurt or Threatened by Someone: No   Housing Stability: Unknown (6/9/2025)    Nursing: Inadequate Housing Risk Classification     Unable to Pay for Housing in the Last Year: No      Has Housing: No   [5]   Current Outpatient Medications:     acetaminophen (TYLENOL) 500 mg tablet, Take 1,000 mg by mouth every 8 (eight) hours as needed for mild pain, Disp: , Rfl:     albuterol (PROVENTIL HFA,VENTOLIN HFA) 90 mcg/act inhaler, Inhale 2 puffs every 4 (four) hours as needed for wheezing or shortness of breath, Disp: , Rfl:     allopurinol (ZYLOPRIM) 100 mg tablet, Take 100 mg by mouth daily, Disp: , Rfl:     ascorbic acid (VITAMIN C) 500 MG tablet, Take 500 mg by mouth in the morning and 500 mg in the evening., Disp: , Rfl:     bumetanide (BUMEX) 1 mg tablet, Take 1 mg by mouth daily, Disp: , Rfl:     ferrous sulfate 325 (65 FE) MG EC tablet, Take 325 mg by mouth every other day, Disp: , Rfl:     gabapentin (NEURONTIN) 100 mg capsule, Take 100 mg by mouth 3 (three) times a day, Disp: , Rfl:     levothyroxine 50 mcg tablet, Take 1 tablet (50 mcg total) by mouth daily, Disp: , Rfl:     Lidocaine 4 % PTCH, Apply 1 patch topically in the morning. Apply to back daily, remove after 12 hours., Disp: , Rfl:     melatonin 3 mg, Take 3 mg by mouth daily at bedtime, Disp: , Rfl:     methocarbamol (ROBAXIN) 500 mg tablet, Take 500 mg by mouth every 8 (eight) hours as needed for muscle spasms, Disp: , Rfl:     metoprolol tartrate (LOPRESSOR) 50 mg tablet, Take 1 tablet (50 mg total) by mouth every 12 (twelve) hours, Disp: , Rfl:     pantoprazole (PROTONIX) 40 mg tablet, Take 1 tablet (40 mg total) by mouth daily in the early morning, Disp: , Rfl:     repaglinide (PRANDIN) 0.5 mg tablet, Take 0.5 mg by mouth 3 (three) times a day before meals, Disp: , Rfl:     sitaGLIPtin (JANUVIA) 25 mg tablet, Take 25 mg by mouth daily, Disp: , Rfl:   [6] No family history on file.

## 2025-06-19 NOTE — ASSESSMENT & PLAN NOTE
Left buttock  Full thickness, with no obvious sign of infection  Local wound care with Triad paste  Continue to offload, on air mattress  Increase protein intake, patient currently on Glucerna terra shake  Follow-up next week

## 2025-06-20 ENCOUNTER — NURSING HOME VISIT (OUTPATIENT)
Dept: PULMONOLOGY | Facility: CLINIC | Age: OVER 89
End: 2025-06-20
Payer: COMMERCIAL

## 2025-06-20 DIAGNOSIS — J18.9 PNEUMONIA DUE TO INFECTIOUS ORGANISM, UNSPECIFIED LATERALITY, UNSPECIFIED PART OF LUNG: Primary | ICD-10-CM

## 2025-06-20 PROCEDURE — 99305 1ST NF CARE MODERATE MDM 35: CPT | Performed by: INTERNAL MEDICINE

## 2025-06-21 ENCOUNTER — TELEPHONE (OUTPATIENT)
Dept: OTHER | Facility: OTHER | Age: OVER 89
End: 2025-06-21

## 2025-06-21 NOTE — TELEPHONE ENCOUNTER
Nursing home or Independent living name:[facility]      Who is calling:Oxana TOURE     Callback number:  [phone]     Symptoms: Reporting that patient had a fall     Paged Roz

## 2025-06-24 ENCOUNTER — NURSING HOME VISIT (OUTPATIENT)
Dept: GERIATRICS | Facility: OTHER | Age: OVER 89
End: 2025-06-24
Payer: COMMERCIAL

## 2025-06-24 VITALS
DIASTOLIC BLOOD PRESSURE: 71 MMHG | OXYGEN SATURATION: 94 % | RESPIRATION RATE: 18 BRPM | SYSTOLIC BLOOD PRESSURE: 123 MMHG | HEART RATE: 62 BPM | TEMPERATURE: 97.6 F

## 2025-06-24 DIAGNOSIS — I48.91 ATRIAL FIBRILLATION, UNSPECIFIED TYPE (HCC): Primary | ICD-10-CM

## 2025-06-24 DIAGNOSIS — E11.69 TYPE 2 DIABETES MELLITUS WITH OTHER SPECIFIED COMPLICATION, WITHOUT LONG-TERM CURRENT USE OF INSULIN (HCC): ICD-10-CM

## 2025-06-24 DIAGNOSIS — R26.2 AMBULATORY DYSFUNCTION: ICD-10-CM

## 2025-06-24 DIAGNOSIS — J21.0 RSV (ACUTE BRONCHIOLITIS DUE TO RESPIRATORY SYNCYTIAL VIRUS): ICD-10-CM

## 2025-06-24 DIAGNOSIS — I50.32 CHRONIC DIASTOLIC HEART FAILURE (HCC): ICD-10-CM

## 2025-06-24 DIAGNOSIS — E03.9 HYPOTHYROIDISM, UNSPECIFIED TYPE: ICD-10-CM

## 2025-06-24 DIAGNOSIS — N18.4 CKD (CHRONIC KIDNEY DISEASE) STAGE 4, GFR 15-29 ML/MIN (HCC): ICD-10-CM

## 2025-06-24 PROCEDURE — 99309 SBSQ NF CARE MODERATE MDM 30: CPT

## 2025-06-24 NOTE — PROGRESS NOTES
[facility]  [address] [address] [phone]  [place] [facility]  Code 31 (STR)          NAME: Dmitriy Craven  AGE: 89 y.o. SEX: male CODE STATUS: CPR    DATE OF ENCOUNTER: 6/24/2025    Assessment and Plan     1. Atrial fibrillation, unspecified type ([facility])  Assessment & Plan:  HR controlled, 62  Denies cp/palpitations  Continue metoprolol 50 mg BID  Coumadin discontinued d/t advanced age, high risk of bleeding and supra therapeutic INR's despite being on lowest doses  2. Chronic diastolic heart failure (HCC)  Assessment & Plan:  Wt Readings from Last 3 Encounters:   06/12/25 60.3 kg (132 lb 15 oz)   Weight stable  Continue metoprolol 50 mg q 12  Continue Bumex 1 mg daily          3. RSV (acute bronchiolitis due to respiratory syncytial virus)  Assessment & Plan:  Tested positive during last hospital stay  Treated with antibiotics and steroids  Doing well today, denies respiratory symptoms  94% on RA  4. Hypothyroidism, unspecified type  Assessment & Plan:  Lab Results   Component Value Date    [id number] 3.180 01/08/2025    TSH 6.27 (H) 05/02/2024    Continue levothyroxine 50 mcg daily  5. Type 2 diabetes mellitus with other specified complication, without long-term current use of insulin ([facility])  Assessment & Plan:    Lab Results   Component Value Date    HGBA1C 6.2 (H) 05/19/2025   Morning   Prandin 0.5 mg TID with meals  Sitagliptin 25 mg daily  Encourage diabetic diet  Avoid hypoglycemia  6. Ambulatory dysfunction  Assessment & Plan:  Multifactorial in the setting of advanced age, chronic conditions  Continue PT/OT  Fall Precautions  Ensure adequate nutrition/hydration   Monitor CBC/BMP    following for d/c planning    7. CKD (chronic kidney disease) stage 4, GFR 15-29 ml/min ([facility])  Assessment & Plan:  Lab Results   Component Value Date    EGFR 28 06/12/2025    EGFR 24 06/11/2025    EGFR 23 06/10/2025    CREATININE 1.97 (H) 06/12/2025    CREATININE 2.25 (H)  06/11/2025    CREATININE 2.34 (H) 06/10/2025   Baseline Cr 2.2  6/20/25 Cr 1.81/ BUN 77/ GFR 34  Repeat BMP 6/26/25  Monitor kidney functions  Encourage adequate PO hydration  Avoid nephrotoxins         All medications and routine orders were reviewed and updated as needed.    Chief Complaint     STR follow up visit  Patient's care was coordinated with nursing facility staff. Recent vitals, labs, and updated medications were review on [facility] system in facility.  Past Medical and Surgical History      Past Medical History[1]  Past Surgical History[2]  No Known Allergies       History of Present Illness     HPI  Dmitriy Craven is a 89 year old male, he is a STR patient of [facility] since 5/24/25. Past Medical Hx including but not limited to HTN, Afib, CKD. He was seen in collaboration with nursing for medical mgmt and STR follow up.     Hospital Course  Patient was admitted to the hospital 5/19/25 from [facility] for c/o back pain. He was found to pneumonia and improved with antibiotics. He was discharged on cefpodoxime to complete course. Patient was recommended fro rehab and was discharged to [facility].    6/9/25-6/12/25  Patient was sent to hospital for elevated INR of 11 and HGB of 6.5. He received Vitamin K, 1 unit PRBC's. Stool was heme positive, patient did not want to have any GI procedures. Additionally patient was positive for RSV, had respiratory symptom and was given antibiotics and steroids. Patient was discharged back to rehab.     Rehab Course  Dmitriy was seen and examined at bedside today. Patient is awake and alert, having lunch in the dining room. He denies pain, difficulty sleeping, and has a good appetite. He continues with PT/OT. Labs reviewed from 6/20/25, BUN elevated, ordered increased PO fluids with med pass x 3 days, repeat BMP 6/26/25.    Denies CP/SOB/N/V/D. Denies lightheadedness, dizziness, headaches, vision changes. Patient states they are  eating well and staying hydrated. Denies any bowel or bladder issues. Per review of SNF records, Patient is eating 3 meals per day, consuming  %. Last documented BM 6/24/25. No concerns from nursing at this time.    The patient's allergies, past medical, surgical, social and family history were reviewed and unchanged.    Review of Systems     Review of Systems   Constitutional:  Positive for activity change and fatigue. Negative for appetite change and fever.   HENT:  Negative for congestion.    Respiratory:  Negative for cough, shortness of breath and wheezing.    Cardiovascular:  Negative for chest pain and palpitations.   Gastrointestinal:  Negative for constipation, diarrhea, nausea and vomiting.   Genitourinary:  Negative for difficulty urinating and dysuria.   Musculoskeletal:  Positive for gait problem.   Skin: Negative.    Neurological:  Positive for weakness.   Psychiatric/Behavioral:  Negative for confusion and sleep disturbance.          Objective     Vitals:   Vitals:    06/24/25 1229   BP: 123/71   Pulse: 62   Resp: 18   Temp: 97.6 °F (36.4 °C)   SpO2: 94%           Physical Exam  Vitals and nursing note reviewed.   Constitutional:       General: He is not in acute distress.     Appearance: Normal appearance. He is not ill-appearing.   HENT:      Head: Normocephalic and atraumatic.     Eyes:      Conjunctiva/sclera: Conjunctivae normal.       Cardiovascular:      Rate and Rhythm: Normal rate and regular rhythm.      Heart sounds: Normal heart sounds.   Pulmonary:      Effort: Pulmonary effort is normal. No respiratory distress.      Breath sounds: Normal breath sounds. No wheezing.   Abdominal:      General: There is no distension.      Tenderness: There is no abdominal tenderness.     Skin:     General: Skin is warm.     Neurological:      Mental Status: He is alert and oriented to person, place, and time.      Motor: Weakness present.      Gait: Gait abnormal.     Psychiatric:         Mood and  "Affect: Mood normal.         Behavior: Behavior normal.         Pertinent Laboratory/Diagnostic Studies:   Reviewed in facility chart-stable  6/20/25  CBC NO DIFF         HEMOGLOBIN 8.0  L 12.5 - 17.0 g/dL       HEMATOCRIT 24.3  L 37.0 - 48.0 %       WBC 9.0 4.0 - 10.5 thou/cmm       RBC 2.39  L 4.00 - 5.40 mill/cmm       PLATELET COUNT 168 140 - 350 thou/cmm       MPV 10.4 7.5 - 11.3 fL         H 80 - 100 fL       MCH 33.5 27.0 - 36.0 pg       MCHC 33.0 32.0 - 37.0 g/dL       RDW 18.4  H 12.0 - 16.0 %         BASIC METABOLIC PNL       GLUCOSE 87 65 - 99 mg/dL       BUN 77  H 7 - 28 mg/dL       CREATININE 1.81  H 0.53 - 1.30 mg/dL       SODIUM 139 135 - 145 mmol/L       POTASSIUM 4.4 3.5 - 5.2 mmol/L       CHLORIDE 103 100 - 109 mmol/L       CARBON DIOXIDE 29 21 - 31 mmol/L       CALCIUM 7.8  L 8.5 - 10.5 mg/dL       ANION GAP 7 3 - 11        eGFRcr 34  L >59         Medications reviewed and updated see facility MAR for details.    Current Medications[3]     Please note:  Voice-recognition software may have been used in the preparation of this document.  Occasional wrong word or \"sound-alike\" substitutions may have occurred due to the inherent limitations of voice recognition software.  Interpretation should be guided by context.         HAILEY Haque  6/24/2025  12:43 PM         [1]   Past Medical History:  Diagnosis Date    Anemia     CHF (congestive heart failure) (HCC)    [2] No past surgical history on file.  [3]   Current Outpatient Medications:     acetaminophen (TYLENOL) 500 mg tablet, Take 1,000 mg by mouth every 8 (eight) hours as needed for mild pain, Disp: , Rfl:     albuterol (PROVENTIL HFA,VENTOLIN HFA) 90 mcg/act inhaler, Inhale 2 puffs every 4 (four) hours as needed for wheezing or shortness of breath, Disp: , Rfl:     allopurinol (ZYLOPRIM) 100 mg tablet, Take 100 mg by mouth daily, Disp: , Rfl:     bumetanide (BUMEX) 1 mg tablet, Take 1 mg by mouth daily, Disp: , Rfl:     " ferrous sulfate 325 (65 FE) MG EC tablet, Take 325 mg by mouth every other day, Disp: , Rfl:     gabapentin (NEURONTIN) 100 mg capsule, Take 100 mg by mouth 3 (three) times a day, Disp: , Rfl:     levothyroxine 50 mcg tablet, Take 1 tablet (50 mcg total) by mouth daily, Disp: , Rfl:     Lidocaine 4 % PTCH, Apply 1 patch topically in the morning. Apply to back daily, remove after 12 hours., Disp: , Rfl:     melatonin 3 mg, Take 3 mg by mouth daily at bedtime, Disp: , Rfl:     metoprolol tartrate (LOPRESSOR) 50 mg tablet, Take 1 tablet (50 mg total) by mouth every 12 (twelve) hours, Disp: , Rfl:     repaglinide (PRANDIN) 0.5 mg tablet, Take 0.5 mg by mouth 3 (three) times a day before meals, Disp: , Rfl:     sitaGLIPtin (JANUVIA) 25 mg tablet, Take 25 mg by mouth daily, Disp: , Rfl:

## 2025-06-24 NOTE — ASSESSMENT & PLAN NOTE
Lab Results   Component Value Date    [id number] 3.180 01/08/2025    TSH 6.27 (H) 05/02/2024    Continue levothyroxine 50 mcg daily

## 2025-06-24 NOTE — ASSESSMENT & PLAN NOTE
Wt Readings from Last 3 Encounters:   06/12/25 60.3 kg (132 lb 15 oz)   Weight stable  Continue metoprolol 50 mg q 12  Continue Bumex 1 mg daily

## 2025-06-24 NOTE — ASSESSMENT & PLAN NOTE
Tested positive during last hospital stay  Treated with antibiotics and steroids  Doing well today, denies respiratory symptoms  94% on RA

## 2025-06-24 NOTE — ASSESSMENT & PLAN NOTE
Lab Results   Component Value Date    EGFR 28 06/12/2025    EGFR 24 06/11/2025    EGFR 23 06/10/2025    CREATININE 1.97 (H) 06/12/2025    CREATININE 2.25 (H) 06/11/2025    CREATININE 2.34 (H) 06/10/2025   Baseline Cr 2.2  6/20/25 Cr 1.81/ BUN 77/ GFR 34  Repeat BMP 6/26/25  Monitor kidney functions  Encourage adequate PO hydration  Avoid nephrotoxins

## 2025-06-24 NOTE — ASSESSMENT & PLAN NOTE
Lab Results   Component Value Date    HGBA1C 6.2 (H) 05/19/2025   Morning   Prandin 0.5 mg TID with meals  Sitagliptin 25 mg daily  Encourage diabetic diet  Avoid hypoglycemia

## 2025-06-24 NOTE — ASSESSMENT & PLAN NOTE
HR controlled, 62  Denies cp/palpitations  Continue metoprolol 50 mg BID  Coumadin discontinued d/t advanced age, high risk of bleeding and supra therapeutic INR's despite being on lowest doses

## 2025-06-25 ENCOUNTER — NURSING HOME VISIT (OUTPATIENT)
Dept: WOUND CARE | Facility: HOSPITAL | Age: OVER 89
End: 2025-06-25
Payer: COMMERCIAL

## 2025-06-25 DIAGNOSIS — R26.2 AMBULATORY DYSFUNCTION: ICD-10-CM

## 2025-06-25 DIAGNOSIS — L89.313 PRESSURE INJURY OF RIGHT BUTTOCK, STAGE 3 (HCC): Primary | ICD-10-CM

## 2025-06-25 PROCEDURE — 99308 SBSQ NF CARE LOW MDM 20: CPT | Performed by: NURSE PRACTITIONER

## 2025-06-26 ENCOUNTER — NURSING HOME VISIT (OUTPATIENT)
Dept: GERIATRICS | Facility: OTHER | Age: OVER 89
End: 2025-06-26

## 2025-06-26 VITALS
RESPIRATION RATE: 18 BRPM | TEMPERATURE: 97.6 F | SYSTOLIC BLOOD PRESSURE: 122 MMHG | DIASTOLIC BLOOD PRESSURE: 76 MMHG | OXYGEN SATURATION: 94 % | HEART RATE: 81 BPM

## 2025-06-26 DIAGNOSIS — I50.32 CHRONIC DIASTOLIC HEART FAILURE (HCC): ICD-10-CM

## 2025-06-26 DIAGNOSIS — E03.9 HYPOTHYROIDISM, UNSPECIFIED TYPE: ICD-10-CM

## 2025-06-26 DIAGNOSIS — J21.0 RSV (ACUTE BRONCHIOLITIS DUE TO RESPIRATORY SYNCYTIAL VIRUS): ICD-10-CM

## 2025-06-26 DIAGNOSIS — I48.91 ATRIAL FIBRILLATION, UNSPECIFIED TYPE (HCC): Primary | ICD-10-CM

## 2025-06-26 DIAGNOSIS — D64.9 ANEMIA, UNSPECIFIED TYPE: ICD-10-CM

## 2025-06-26 DIAGNOSIS — R26.2 AMBULATORY DYSFUNCTION: ICD-10-CM

## 2025-06-26 DIAGNOSIS — E11.69 TYPE 2 DIABETES MELLITUS WITH OTHER SPECIFIED COMPLICATION, WITHOUT LONG-TERM CURRENT USE OF INSULIN (HCC): ICD-10-CM

## 2025-06-26 PROBLEM — L89.313 PRESSURE INJURY OF RIGHT BUTTOCK, STAGE 3 (HCC): Status: ACTIVE | Noted: 2025-06-19

## 2025-06-26 NOTE — ASSESSMENT & PLAN NOTE
Right buttock  Full thickness, with no obvious sign of infection, wound improved  Local wound care with Triad paste  Continue to offload, on air mattress  Increase protein intake, patient currently on Glucerna terra shake  Follow-up next week

## 2025-06-26 NOTE — PROGRESS NOTES
[facility]  [address] [address] [phone]  [place] [facility]  Code 31 (STR)          NAME: Dmitriy Craven  AGE: 89 y.o. SEX: male CODE STATUS: CPR    DATE OF ENCOUNTER: 6/26/2025    Assessment and Plan     1. Atrial fibrillation, unspecified type (HCC)  Assessment & Plan:  HR controlled, 81  Denies cp/palpitations  Continue metoprolol 50 mg BID  Coumadin discontinued d/t advanced age, high risk of bleeding and supra therapeutic INR's despite being on lowest doses  2. Chronic diastolic heart failure (HCC)  Assessment & Plan:  Wt Readings from Last 3 Encounters:   06/12/25 60.3 kg (132 lb 15 oz)   Weight stable  Continue metoprolol 50 mg q 12  Continue Bumex 1 mg daily  3. RSV (acute bronchiolitis due to respiratory syncytial virus)  Assessment & Plan:  Tested positive during last hospital stay  Treated with antibiotics and steroids  Doing well today, denies respiratory symptoms  94% on RA  4. Hypothyroidism, unspecified type  Assessment & Plan:  Lab Results   Component Value Date    [id number] 3.180 01/08/2025    TSH 6.27 (H) 05/02/2024    Continue levothyroxine 50 mcg daily  5. Type 2 diabetes mellitus with other specified complication, without long-term current use of insulin ([facility])  Assessment & Plan:    Lab Results   Component Value Date    HGBA1C 6.2 (H) 05/19/2025   Morning   Prandin 0.5 mg TID with meals  Sitagliptin 25 mg daily  Encourage diabetic diet  Avoid hypoglycemia  6. Ambulatory dysfunction  Assessment & Plan:  Multifactorial in the setting of advanced age, chronic conditions  Continue PT/OT  Fall Precautions  Ensure adequate nutrition/hydration   Monitor CBC/BMP    following for d/c planning    7. Anemia, unspecified type  Assessment & Plan:  Positive heme occult  Received 1 unit PRBC's  Patient had declined scopes, continue conservative measures  Monitor CBC         All medications and routine orders were reviewed and updated as needed.    Chief  Complaint     STR follow up visit  Patient's care was coordinated with nursing facility staff. Recent vitals, labs, and updated medications were review on [facility] system in facility.  Past Medical and Surgical History      Past Medical History[1]  Past Surgical History[2]  No Known Allergies       History of Present Illness     HPI  Dmitriy Craven is a 89 year old male, he is a STR patient of [facility] since 5/24/25. Past Medical Hx including but not limited to HTN, Afib, CKD. He was seen in collaboration with nursing for medical mgmt and STR follow up.     Hospital Course  Patient was admitted to the hospital 5/19/25 from [facility] for c/o back pain. He was found to pneumonia and improved with antibiotics. He was discharged on cefpodoxime to complete course. Patient was recommended fro rehab and was discharged to [facility].    6/9/25-6/12/25  Patient was sent to hospital for elevated INR of 11 and HGB of 6.5. He received Vitamin K, 1 unit PRBC's. Stool was heme positive, patient did not want to have any GI procedures. Additionally patient was positive for RSV, had respiratory symptom and was given antibiotics and steroids. Patient was discharged back to rehab.     Rehab Course  Dmitriy was seen and examined at bedside today. Patient is awake and alert, sitting comfortably in his chair. He denies pain, difficulty sleeping, and has a good appetite. He continues with PT/OT. Labs repeated, encourage po hydration.    Denies CP/SOB/N/V/D. Denies lightheadedness, dizziness, headaches, vision changes. Patient states they are eating well and staying hydrated. Denies any bowel or bladder issues. Per review of SNF records, Patient is eating 3 meals per day, consuming  %. Last documented BM 6/24/25. No concerns from nursing at this time.    The patient's allergies, past medical, surgical, social and family history were reviewed and unchanged.    Review of Systems     Review of  Systems   Constitutional:  Positive for activity change and fatigue. Negative for appetite change and fever.   HENT:  Negative for congestion.    Respiratory:  Negative for cough, shortness of breath and wheezing.    Cardiovascular:  Negative for chest pain and palpitations.   Gastrointestinal:  Negative for constipation, diarrhea, nausea and vomiting.   Genitourinary:  Negative for difficulty urinating and dysuria.   Musculoskeletal:  Positive for gait problem.   Skin: Negative.    Neurological:  Positive for weakness.   Psychiatric/Behavioral:  Negative for confusion and sleep disturbance.          Objective     Vitals:   Vitals:    06/26/25 1620   BP: 122/76   Pulse: 81   Resp: 18   Temp: 97.6 °F (36.4 °C)   SpO2: 94%           Physical Exam  Vitals and nursing note reviewed.   Constitutional:       General: He is not in acute distress.     Appearance: Normal appearance. He is not ill-appearing.   HENT:      Head: Normocephalic and atraumatic.     Eyes:      Conjunctiva/sclera: Conjunctivae normal.       Cardiovascular:      Rate and Rhythm: Normal rate and regular rhythm.      Heart sounds: Normal heart sounds.   Pulmonary:      Effort: Pulmonary effort is normal. No respiratory distress.      Breath sounds: Normal breath sounds. No wheezing.   Abdominal:      General: There is no distension.      Tenderness: There is no abdominal tenderness.     Skin:     General: Skin is warm.     Neurological:      Mental Status: He is alert and oriented to person, place, and time.      Motor: Weakness present.      Gait: Gait abnormal.     Psychiatric:         Mood and Affect: Mood normal.         Behavior: Behavior normal.         Pertinent Laboratory/Diagnostic Studies:   Reviewed in facility chart-stable  6/26/25  BASIC METABOLIC PNL         GLUCOSE 125  H 65 - 99 mg/dL       BUN 78  H 7 - 28 mg/dL       CREATININE 1.71  H 0.53 - 1.30 mg/dL       SODIUM 139 135 - 145 mmol/L       POTASSIUM 4.2 3.5 - 5.2 mmol/L        "CHLORIDE 101 100 - 109 mmol/L       CARBON DIOXIDE 28 21 - 31 mmol/L       CALCIUM 8.1  L 8.5 - 10.5 mg/dL       ANION GAP 10 3 - 11        eGFRcr 37  L >59       TSH WITH FT4 REFLEX        THYROID STIM HORMONE 4.71 0.45 - 5.33 uIU/mL       6/20/25  CBC NO DIFF         HEMOGLOBIN 8.0  L 12.5 - 17.0 g/dL       HEMATOCRIT 24.3  L 37.0 - 48.0 %       WBC 9.0 4.0 - 10.5 thou/cmm       RBC 2.39  L 4.00 - 5.40 mill/cmm       PLATELET COUNT 168 140 - 350 thou/cmm       MPV 10.4 7.5 - 11.3 fL         H 80 - 100 fL       MCH 33.5 27.0 - 36.0 pg       MCHC 33.0 32.0 - 37.0 g/dL       RDW 18.4  H 12.0 - 16.0 %         BASIC METABOLIC PNL       GLUCOSE 87 65 - 99 mg/dL       BUN 77  H 7 - 28 mg/dL       CREATININE 1.81  H 0.53 - 1.30 mg/dL       SODIUM 139 135 - 145 mmol/L       POTASSIUM 4.4 3.5 - 5.2 mmol/L       CHLORIDE 103 100 - 109 mmol/L       CARBON DIOXIDE 29 21 - 31 mmol/L       CALCIUM 7.8  L 8.5 - 10.5 mg/dL       ANION GAP 7 3 - 11        eGFRcr 34  L >59         Medications reviewed and updated see facility MAR for details.    Current Medications[3]     Please note:  Voice-recognition software may have been used in the preparation of this document.  Occasional wrong word or \"sound-alike\" substitutions may have occurred due to the inherent limitations of voice recognition software.  Interpretation should be guided by context.         HAILEY Haque  6/26/2025  4:23 PM         [1]   Past Medical History:  Diagnosis Date    Anemia     CHF (congestive heart failure) (HCC)    [2] No past surgical history on file.  [3]   Current Outpatient Medications:     acetaminophen (TYLENOL) 500 mg tablet, Take 1,000 mg by mouth every 8 (eight) hours as needed for mild pain, Disp: , Rfl:     albuterol (PROVENTIL HFA,VENTOLIN HFA) 90 mcg/act inhaler, Inhale 2 puffs every 4 (four) hours as needed for wheezing or shortness of breath, Disp: , Rfl:     allopurinol (ZYLOPRIM) 100 mg tablet, Take 100 mg by mouth daily, " Disp: , Rfl:     bumetanide (BUMEX) 1 mg tablet, Take 1 mg by mouth daily, Disp: , Rfl:     ferrous sulfate 325 (65 FE) MG EC tablet, Take 325 mg by mouth every other day, Disp: , Rfl:     gabapentin (NEURONTIN) 100 mg capsule, Take 100 mg by mouth 3 (three) times a day, Disp: , Rfl:     levothyroxine 50 mcg tablet, Take 1 tablet (50 mcg total) by mouth daily, Disp: , Rfl:     Lidocaine 4 % PTCH, Apply 1 patch topically in the morning. Apply to back daily, remove after 12 hours., Disp: , Rfl:     melatonin 3 mg, Take 3 mg by mouth daily at bedtime, Disp: , Rfl:     metoprolol tartrate (LOPRESSOR) 50 mg tablet, Take 1 tablet (50 mg total) by mouth every 12 (twelve) hours, Disp: , Rfl:     repaglinide (PRANDIN) 0.5 mg tablet, Take 0.5 mg by mouth 3 (three) times a day before meals, Disp: , Rfl:     sitaGLIPtin (JANUVIA) 25 mg tablet, Take 25 mg by mouth daily, Disp: , Rfl:

## 2025-06-26 NOTE — ASSESSMENT & PLAN NOTE
Lab Results   Component Value Date    [id number] 3.180 01/08/2025    TSH 6.27 (H) 05/02/2024    Continue levothyroxine 50 mcg daily

## 2025-06-26 NOTE — PROGRESS NOTES
[facility] WOUND CARE MANAGEMENT   AND HYPERBARIC MEDICINE CENTER       Patient ID: Dmitriy Craven is a 89 y.o. male Date of Birth 3/12/1932     Location of Service: [facility]    Performed wound round with: Wound team     Chief Complaint : Right buttock    Wound Instructions:  Wound: Right buttock  Discontinue previous wound order  Cleanse the wound bed with NSS   Apply non-sting skin prep to periwound area  Apply Triad paste to wound bed  Frequency : daily and prn for soiling  Offload all wounds  Turn and reposition frequently  Instruct / Assist with weight shifting in wheelchair  Increase protein intake.  Monitor for any sign of infection or worsening, inform PCP or patient's primary physician in your facility.      Allergies  Patient has no known allergies.      Assessment & Plan:  1. Pressure injury of right buttock, stage 3 (HCC)  Assessment & Plan:  Right buttock  Full thickness, with no obvious sign of infection, wound improved  Local wound care with Triad paste  Continue to offload, on air mattress  Increase protein intake, patient currently on Glucerna terra shake  Follow-up next week  2. Ambulatory dysfunction  Assessment & Plan:  on short-term rehab             Subjective:   June 18, 2025.  New consult for wound on the right buttock.  Medical problem includes but not limited to chronic kidney disease, A-fib, type 2 diabetes.  I introduced myself to patient and patient agreed to be seen for wound consult.  Patient was seen with the facility wound team.    Wound history: As per report, patient was admitted with a stage III pressure ulcer on admission    Received patient in bed, seems comfortable.  Denies pain.  Needs assistance with turning and repositioning in bed.  Patient is incontinent of bladder.  Have a good appetite.    June 25, 2025.  Follow-up for wound on the right buttock.  Correction on the previous note, wound is on the right buttock and not left buttock.  No significant  "issues reported related to the wound.            Review of Systems   Constitutional: Negative.    Respiratory: Negative.     Cardiovascular: Negative.    Musculoskeletal:  Positive for gait problem.   Skin:  Positive for wound.       Objective:    Physical Exam  Constitutional:       Appearance: Normal appearance.     Cardiovascular:      Rate and Rhythm: Normal rate.   Pulmonary:      Effort: Pulmonary effort is normal.   Genitourinary:     Comments: Incontinent    Skin:     Findings: Lesion present.      Comments: Right buttock: Wound size is 0.35 x 0.41 x 0.1 cm.,  100% granulation, no drainage, periwound normal, with no obvious sign of infection     Neurological:      Mental Status: He is alert.              Procedures           Patient's care was coordinated with nursing facility staff. Recent vitals, labs and updated medications were reviewed on EMR or chart system of facility. Past Medical, surgical, social, medication and allergy history and patient's previous records were reviewed and updated as appropriate: Most up-to date information is available in the facility EMR where the patient is currently admitted.    Problem List[1]  Past Medical History[2]  Past Surgical History[3]  Social History[4]   Current Medications[5]  Family History[6]           Coordination of Care: Wound team aware of the treatment plan. Facility nurse will provide wound treatment and monitor the wound for any changes.     Patient / Staff education : Patient / Staff was given education on sign of infection and pressure ulcer prevention. Patient/ Staff verbalized understanding     Follow up :  Next week    Voice-recognition software may have been used in the preparation of this document. Occasional wrong word or \"sound-alike\" substitutions may have occurred due to the inherent limitations of voice recognition software. Interpretation should be guided by context.      HAILEY Thurman       [1]   Patient Active Problem " List  Diagnosis    Skin tear of right elbow without complication    Skin tear of left elbow without complication    Skin tear of right lower leg without complication    Spinal stenosis of lumbar region without neurogenic claudication    CKD (chronic kidney disease) stage 4, GFR 15-29 ml/min (HCC)    Atrial fibrillation (HCC)    Pulmonary hypertension (HCC)    Pleural effusion, left    Pneumonia of left lower lobe due to infectious organism    Rib pain on left side    Ambulatory dysfunction    Hypothyroidism    Supratherapeutic INR    Anemia    Pneumonia    Chronic diastolic heart failure (HCC)    RSV (acute bronchiolitis due to respiratory syncytial virus)    DM2 (diabetes mellitus, type 2) (HCC)    Pressure injury of right buttock, stage 3 (HCC)   [2]   Past Medical History:  Diagnosis Date    Anemia     CHF (congestive heart failure) ([facility])    [3] No past surgical history on file.  [4]   Social History  Socioeconomic History    Marital status:    Tobacco Use    Smoking status: Former     Types: Cigarettes    Smokeless tobacco: Never   Substance and Sexual Activity    Alcohol use: Not Currently    Drug use: Never     Social Drivers of Health     Financial Resource Strain: Not At Risk (5/19/2025)    Received from [facility]    Financial Insecurity     In the last 12 months did you skip medications to save money?: No     In the last 12 months was there a time when you needed to see a doctor but could not because of cost?: No   Food Insecurity: No Food Insecurity (6/9/2025)    Nursing - Inadequate Food Risk Classification     Ran Out of Food in the Last Year: Never true   Transportation Needs: No Transportation Needs (6/9/2025)    Nursing - Transportation Risk Classification     Lack of Transportation: No   Social Connections: Socially Integrated (5/19/2025)    Received from [facility]    Social Connection     Do you often feel lonely?: No   Intimate Partner Violence: Unknown  (6/9/2025)    Nursing IPS     Physically Hurt by Someone: No     Hurt or Threatened by Someone: No   Housing Stability: Unknown (6/9/2025)    Nursing: Inadequate Housing Risk Classification     Unable to Pay for Housing in the Last Year: No     Has Housing: No   [5]   Current Outpatient Medications:     acetaminophen (TYLENOL) 500 mg tablet, Take 1,000 mg by mouth every 8 (eight) hours as needed for mild pain, Disp: , Rfl:     albuterol (PROVENTIL HFA,VENTOLIN HFA) 90 mcg/act inhaler, Inhale 2 puffs every 4 (four) hours as needed for wheezing or shortness of breath, Disp: , Rfl:     allopurinol (ZYLOPRIM) 100 mg tablet, Take 100 mg by mouth daily, Disp: , Rfl:     bumetanide (BUMEX) 1 mg tablet, Take 1 mg by mouth daily, Disp: , Rfl:     ferrous sulfate 325 (65 FE) MG EC tablet, Take 325 mg by mouth every other day, Disp: , Rfl:     gabapentin (NEURONTIN) 100 mg capsule, Take 100 mg by mouth 3 (three) times a day, Disp: , Rfl:     levothyroxine 50 mcg tablet, Take 1 tablet (50 mcg total) by mouth daily, Disp: , Rfl:     Lidocaine 4 % PTCH, Apply 1 patch topically in the morning. Apply to back daily, remove after 12 hours., Disp: , Rfl:     melatonin 3 mg, Take 3 mg by mouth daily at bedtime, Disp: , Rfl:     metoprolol tartrate (LOPRESSOR) 50 mg tablet, Take 1 tablet (50 mg total) by mouth every 12 (twelve) hours, Disp: , Rfl:     repaglinide (PRANDIN) 0.5 mg tablet, Take 0.5 mg by mouth 3 (three) times a day before meals, Disp: , Rfl:     sitaGLIPtin (JANUVIA) 25 mg tablet, Take 25 mg by mouth daily, Disp: , Rfl:   [6] No family history on file.

## 2025-06-26 NOTE — ASSESSMENT & PLAN NOTE
HR controlled, 81  Denies cp/palpitations  Continue metoprolol 50 mg BID  Coumadin discontinued d/t advanced age, high risk of bleeding and supra therapeutic INR's despite being on lowest doses

## 2025-06-28 PROBLEM — J18.9 PNEUMONIA OF LEFT LOWER LOBE DUE TO INFECTIOUS ORGANISM: Status: RESOLVED | Noted: 2025-05-29 | Resolved: 2025-06-28

## 2025-06-29 ENCOUNTER — TELEPHONE (OUTPATIENT)
Dept: OTHER | Facility: OTHER | Age: OVER 89
End: 2025-06-29

## 2025-06-30 ENCOUNTER — NURSING HOME VISIT (OUTPATIENT)
Dept: GERIATRICS | Facility: OTHER | Age: OVER 89
End: 2025-06-30
Payer: COMMERCIAL

## 2025-06-30 VITALS
TEMPERATURE: 97.6 F | SYSTOLIC BLOOD PRESSURE: 128 MMHG | WEIGHT: 132.3 LBS | RESPIRATION RATE: 18 BRPM | BODY MASS INDEX: 20.72 KG/M2 | DIASTOLIC BLOOD PRESSURE: 70 MMHG | HEART RATE: 74 BPM | OXYGEN SATURATION: 96 %

## 2025-06-30 DIAGNOSIS — E03.9 HYPOTHYROIDISM, UNSPECIFIED TYPE: ICD-10-CM

## 2025-06-30 DIAGNOSIS — I48.91 ATRIAL FIBRILLATION, UNSPECIFIED TYPE (HCC): Primary | ICD-10-CM

## 2025-06-30 DIAGNOSIS — I50.32 CHRONIC DIASTOLIC HEART FAILURE (HCC): ICD-10-CM

## 2025-06-30 DIAGNOSIS — E11.69 TYPE 2 DIABETES MELLITUS WITH OTHER SPECIFIED COMPLICATION, WITHOUT LONG-TERM CURRENT USE OF INSULIN (HCC): ICD-10-CM

## 2025-06-30 DIAGNOSIS — R26.2 AMBULATORY DYSFUNCTION: ICD-10-CM

## 2025-06-30 DIAGNOSIS — N18.4 CKD (CHRONIC KIDNEY DISEASE) STAGE 4, GFR 15-29 ML/MIN (HCC): ICD-10-CM

## 2025-06-30 PROCEDURE — 99309 SBSQ NF CARE MODERATE MDM 30: CPT

## 2025-06-30 NOTE — ASSESSMENT & PLAN NOTE
Lab Results   Component Value Date    [id number] 3.180 01/08/2025    TSH 6.27 (H) 05/02/2024    Continue levothyroxine 50 mcg daily

## 2025-06-30 NOTE — TELEPHONE ENCOUNTER
Nursing home or Independent living name:[facility]  If its not nursing home or Independent living, do they see PCP in Network:  Who is calling:Manoj  Callback number: [phone]  Symptoms: Seizure  Did you page oncall or place in triage hub:On call paged

## 2025-06-30 NOTE — PROGRESS NOTES
[facility]  [address] [address] [phone]  [place]  Code 31 (STR)          NAME: Dmitriy Craven  AGE: 89 y.o. SEX: male CODE STATUS: CPR    DATE OF ENCOUNTER: 6/30/2025    Assessment and Plan     1. Atrial fibrillation, unspecified type ([facility])  Assessment & Plan:  HR controlled, 74  Denies cp/palpitations  Continue metoprolol 50 mg BID  Coumadin discontinued d/t advanced age, high risk of bleeding and supra therapeutic INR's despite being on lowest doses  2. Chronic diastolic heart failure (HCC)  Assessment & Plan:  Wt Readings from Last 3 Encounters:   06/12/25 60.3 kg (132 lb 15 oz)   Weight stable  Continue metoprolol 50 mg q 12  Continue Bumex 1 mg daily  3. Hypothyroidism, unspecified type  Assessment & Plan:  Lab Results   Component Value Date    [id number] 3.180 01/08/2025    TSH 6.27 (H) 05/02/2024    Continue levothyroxine 50 mcg daily  4. Type 2 diabetes mellitus with other specified complication, without long-term current use of insulin ([facility])  Assessment & Plan:    Lab Results   Component Value Date    HGBA1C 6.2 (H) 05/19/2025   Morning   Prandin 0.5 mg TID with meals  Sitagliptin 25 mg daily  Encourage diabetic diet  Avoid hypoglycemia  5. CKD (chronic kidney disease) stage 4, GFR 15-29 ml/min ([facility])  Assessment & Plan:  Lab Results   Component Value Date    EGFR 28 06/12/2025    EGFR 24 06/11/2025    EGFR 23 06/10/2025    CREATININE 1.97 (H) 06/12/2025    CREATININE 2.25 (H) 06/11/2025    CREATININE 2.34 (H) 06/10/2025   Baseline Cr 2.2  6/20/25 Cr 1.81/ BUN 77/ GFR 34  Repeat BMP 6/26/25  Monitor kidney functions  Encourage adequate PO hydration  Avoid nephrotoxins  6. Ambulatory dysfunction  Assessment & Plan:  Multifactorial in the setting of advanced age, chronic conditions  Continue PT/OT  Fall Precautions  Ensure adequate nutrition/hydration   Monitor CBC/BMP    following for d/c planning           All medications and routine orders  were reviewed and updated as needed.    Chief Complaint     STR follow up visit  Patient's care was coordinated with nursing facility staff. Recent vitals, labs, and updated medications were review on [facility] system in facility.  Past Medical and Surgical History      Past Medical History[1]  Past Surgical History[2]  No Known Allergies       History of Present Illness     HPI  Dmitriy Craven is a 89 year old male, he is a STR patient of [facility] since 5/24/25. Past Medical Hx including but not limited to HTN, Afib, CKD. He was seen in collaboration with nursing for medical mgmt and STR follow up.     Hospital Course  Patient was admitted to the hospital 5/19/25 from [facility] for c/o back pain. He was found to pneumonia and improved with antibiotics. He was discharged on cefpodoxime to complete course. Patient was recommended fro rehab and was discharged to [facility].    6/9/25-6/12/25  Patient was sent to hospital for elevated INR of 11 and HGB of 6.5. He received Vitamin K, 1 unit PRBC's. Stool was heme positive, patient did not want to have any GI procedures. Additionally patient was positive for RSV, had respiratory symptom and was given antibiotics and steroids. Patient was discharged back to rehab.     Rehab Course  Dmitriy was seen and examined at bedside today. Patient is awake and alert, sitting comfortably in his chair. He says he is doing well, hopes to go home soon. He denies pain, difficulty sleeping, and has a good appetite.  Denies CP/SOB/N/V/D. Denies lightheadedness, dizziness, headaches, vision changes. Patient states they are eating well and staying hydrated. Denies any bowel or bladder issues. Per review of SNF records, Patient is eating 3 meals per day, consuming  %. Last documented BM 6/24/25. No concerns from nursing at this time.    The patient's allergies, past medical, surgical, social and family history were reviewed and  unchanged.    Review of Systems     Review of Systems   Constitutional:  Negative for activity change, appetite change and fever.   HENT:  Negative for congestion.    Respiratory:  Negative for cough, shortness of breath and wheezing.    Cardiovascular:  Negative for chest pain and palpitations.   Gastrointestinal:  Negative for constipation, diarrhea, nausea and vomiting.   Genitourinary:  Negative for difficulty urinating and dysuria.   Musculoskeletal:  Positive for gait problem.   Skin: Negative.    Neurological:  Negative for weakness.   Psychiatric/Behavioral:  Negative for confusion and sleep disturbance.          Objective     Vitals:   Vitals:    06/30/25 1231   BP: 128/70   Pulse: 74   Resp: 18   Temp: 97.6 °F (36.4 °C)   SpO2: 96%             Physical Exam  Vitals and nursing note reviewed.   Constitutional:       General: He is not in acute distress.     Appearance: Normal appearance. He is not ill-appearing.   HENT:      Head: Normocephalic and atraumatic.     Eyes:      Conjunctiva/sclera: Conjunctivae normal.       Cardiovascular:      Rate and Rhythm: Normal rate and regular rhythm.      Heart sounds: Normal heart sounds.   Pulmonary:      Effort: Pulmonary effort is normal. No respiratory distress.      Breath sounds: Normal breath sounds. No wheezing.   Abdominal:      General: There is no distension.      Tenderness: There is no abdominal tenderness.     Skin:     General: Skin is warm.     Neurological:      Mental Status: He is alert and oriented to person, place, and time.      Motor: Weakness present.      Gait: Gait abnormal.     Psychiatric:         Mood and Affect: Mood normal.         Behavior: Behavior normal.         Pertinent Laboratory/Diagnostic Studies:   Reviewed in facility chart-stable  6/26/25  BASIC METABOLIC PNL         GLUCOSE 125  H 65 - 99 mg/dL       BUN 78  H 7 - 28 mg/dL       CREATININE 1.71  H 0.53 - 1.30 mg/dL       SODIUM 139 135 - 145 mmol/L       POTASSIUM 4.2 3.5 -  "5.2 mmol/L       CHLORIDE 101 100 - 109 mmol/L       CARBON DIOXIDE 28 21 - 31 mmol/L       CALCIUM 8.1  L 8.5 - 10.5 mg/dL       ANION GAP 10 3 - 11        eGFRcr 37  L >59       TSH WITH FT4 REFLEX        THYROID STIM HORMONE 4.71 0.45 - 5.33 uIU/mL       6/20/25  CBC NO DIFF         HEMOGLOBIN 8.0  L 12.5 - 17.0 g/dL       HEMATOCRIT 24.3  L 37.0 - 48.0 %       WBC 9.0 4.0 - 10.5 thou/cmm       RBC 2.39  L 4.00 - 5.40 mill/cmm       PLATELET COUNT 168 140 - 350 thou/cmm       MPV 10.4 7.5 - 11.3 fL         H 80 - 100 fL       MCH 33.5 27.0 - 36.0 pg       MCHC 33.0 32.0 - 37.0 g/dL       RDW 18.4  H 12.0 - 16.0 %         BASIC METABOLIC PNL       GLUCOSE 87 65 - 99 mg/dL       BUN 77  H 7 - 28 mg/dL       CREATININE 1.81  H 0.53 - 1.30 mg/dL       SODIUM 139 135 - 145 mmol/L       POTASSIUM 4.4 3.5 - 5.2 mmol/L       CHLORIDE 103 100 - 109 mmol/L       CARBON DIOXIDE 29 21 - 31 mmol/L       CALCIUM 7.8  L 8.5 - 10.5 mg/dL       ANION GAP 7 3 - 11        eGFRcr 34  L >59         Medications reviewed and updated see facility MAR for details.    Current Medications[3]     Please note:  Voice-recognition software may have been used in the preparation of this document.  Occasional wrong word or \"sound-alike\" substitutions may have occurred due to the inherent limitations of voice recognition software.  Interpretation should be guided by context.         HAILEY Haque  6/30/2025  12:34 PM         [1]   Past Medical History:  Diagnosis Date    Anemia     CHF (congestive heart failure) (HCC)    [2] No past surgical history on file.  [3]   Current Outpatient Medications:     acetaminophen (TYLENOL) 500 mg tablet, Take 1,000 mg by mouth every 8 (eight) hours as needed for mild pain, Disp: , Rfl:     albuterol (PROVENTIL HFA,VENTOLIN HFA) 90 mcg/act inhaler, Inhale 2 puffs every 4 (four) hours as needed for wheezing or shortness of breath, Disp: , Rfl:     allopurinol (ZYLOPRIM) 100 mg tablet, Take 100 " mg by mouth daily, Disp: , Rfl:     bumetanide (BUMEX) 1 mg tablet, Take 1 mg by mouth daily, Disp: , Rfl:     ferrous sulfate 325 (65 FE) MG EC tablet, Take 325 mg by mouth every other day, Disp: , Rfl:     gabapentin (NEURONTIN) 100 mg capsule, Take 100 mg by mouth 3 (three) times a day, Disp: , Rfl:     levothyroxine 50 mcg tablet, Take 1 tablet (50 mcg total) by mouth daily, Disp: , Rfl:     Lidocaine 4 % PTCH, Apply 1 patch topically in the morning. Apply to back daily, remove after 12 hours., Disp: , Rfl:     melatonin 3 mg, Take 3 mg by mouth daily at bedtime, Disp: , Rfl:     metoprolol tartrate (LOPRESSOR) 50 mg tablet, Take 1 tablet (50 mg total) by mouth every 12 (twelve) hours, Disp: , Rfl:     repaglinide (PRANDIN) 0.5 mg tablet, Take 0.5 mg by mouth 3 (three) times a day before meals, Disp: , Rfl:     sitaGLIPtin (JANUVIA) 25 mg tablet, Take 25 mg by mouth daily, Disp: , Rfl:

## 2025-06-30 NOTE — ASSESSMENT & PLAN NOTE
HR controlled, 74  Denies cp/palpitations  Continue metoprolol 50 mg BID  Coumadin discontinued d/t advanced age, high risk of bleeding and supra therapeutic INR's despite being on lowest doses

## 2025-07-02 ENCOUNTER — NURSING HOME VISIT (OUTPATIENT)
Dept: WOUND CARE | Facility: HOSPITAL | Age: OVER 89
End: 2025-07-02
Payer: COMMERCIAL

## 2025-07-02 ENCOUNTER — NURSING HOME VISIT (OUTPATIENT)
Dept: GERIATRICS | Facility: OTHER | Age: OVER 89
End: 2025-07-02
Payer: COMMERCIAL

## 2025-07-02 VITALS
SYSTOLIC BLOOD PRESSURE: 156 MMHG | HEART RATE: 76 BPM | RESPIRATION RATE: 18 BRPM | OXYGEN SATURATION: 96 % | TEMPERATURE: 97.6 F | DIASTOLIC BLOOD PRESSURE: 75 MMHG

## 2025-07-02 DIAGNOSIS — R26.2 AMBULATORY DYSFUNCTION: ICD-10-CM

## 2025-07-02 DIAGNOSIS — E03.9 HYPOTHYROIDISM, UNSPECIFIED TYPE: ICD-10-CM

## 2025-07-02 DIAGNOSIS — I48.91 ATRIAL FIBRILLATION, UNSPECIFIED TYPE (HCC): Primary | ICD-10-CM

## 2025-07-02 DIAGNOSIS — I50.32 CHRONIC DIASTOLIC HEART FAILURE (HCC): ICD-10-CM

## 2025-07-02 DIAGNOSIS — L89.313 PRESSURE INJURY OF RIGHT BUTTOCK, STAGE 3 (HCC): Primary | ICD-10-CM

## 2025-07-02 DIAGNOSIS — E11.69 TYPE 2 DIABETES MELLITUS WITH OTHER SPECIFIED COMPLICATION, WITHOUT LONG-TERM CURRENT USE OF INSULIN (HCC): ICD-10-CM

## 2025-07-02 PROCEDURE — 99309 SBSQ NF CARE MODERATE MDM 30: CPT | Performed by: NURSE PRACTITIONER

## 2025-07-02 PROCEDURE — 99309 SBSQ NF CARE MODERATE MDM 30: CPT

## 2025-07-02 NOTE — ASSESSMENT & PLAN NOTE
HR controlled, 76  Denies cp/palpitations  Continue metoprolol 50 mg BID  Coumadin discontinued d/t advanced age, high risk of bleeding and supra therapeutic INR's despite being on lowest doses

## 2025-07-02 NOTE — PROGRESS NOTES
[facility]  [address] [address]  [phone]  [facility]  Code 31 (STR)          NAME: Dmitriy Craven  AGE: 89 y.o. SEX: male CODE STATUS: CPR    DATE OF ENCOUNTER: 7/2/2025    Assessment and Plan     1. Atrial fibrillation, unspecified type (HCC)  Assessment & Plan:  HR controlled, 76  Denies cp/palpitations  Continue metoprolol 50 mg BID  Coumadin discontinued d/t advanced age, high risk of bleeding and supra therapeutic INR's despite being on lowest doses  2. Chronic diastolic heart failure (HCC)  Assessment & Plan:  Wt Readings from Last 3 Encounters:   06/30/25 60 kg (132 lb 4.8 oz)   06/12/25 60.3 kg (132 lb 15 oz)   Weight stable  Continue metoprolol 50 mg q 12  Continue Bumex 1 mg daily  Monitor for s/s of fluid overload  3. Hypothyroidism, unspecified type  Assessment & Plan:  Lab Results   Component Value Date    MTQ2BQVXHBRJ 3.180 01/08/2025    TSH 6.27 (H) 05/02/2024    Continue levothyroxine 50 mcg daily  4. Type 2 diabetes mellitus with other specified complication, without long-term current use of insulin ([facility])  Assessment & Plan:    Lab Results   Component Value Date    HGBA1C 6.2 (H) 05/19/2025   Morning   Prandin 0.5 mg TID with meals  Sitagliptin 25 mg daily  Encourage diabetic diet  Avoid hypoglycemia  5. Ambulatory dysfunction  Assessment & Plan:  Multifactorial in the setting of advanced age, chronic conditions  Continue PT/OT  Fall Precautions  Ensure adequate nutrition/hydration   Monitor CBC/BMP    following for d/c planning           All medications and routine orders were reviewed and updated as needed.    Chief Complaint     STR follow up visit  Patient's care was coordinated with nursing facility staff. Recent vitals, labs, and updated medications were review on [facility] system in facility.  Past Medical and Surgical History      Past Medical History[1]  Past Surgical History[2]  No Known Allergies       History of Present  Illness     HPI  Dmitriy Craven is a 89 year old male, he is a STR patient of [facility] since 5/24/25. Past Medical Hx including but not limited to HTN, Afib, CKD. He was seen in collaboration with nursing for medical mgmt and STR follow up.     Hospital Course  Patient was admitted to the hospital 5/19/25 from [facility] for c/o back pain. He was found to pneumonia and improved with antibiotics. He was discharged on cefpodoxime to complete course. Patient was recommended fro rehab and was discharged to [facility].    6/9/25-6/12/25  Patient was sent to hospital for elevated INR of 11 and HGB of 6.5. He received Vitamin K, 1 unit PRBC's. Stool was heme positive, patient did not want to have any GI procedures. Additionally patient was positive for RSV, had respiratory symptom and was given antibiotics and steroids. Patient was discharged back to rehab.     Rehab Course  Dmitriy was seen and examined at bedside today. Patient is awake and alert, sitting comfortably in his chair. He says he is doing well today, says therapy has been going well. He denies pain, difficulty sleeping, appetite is good.     Denies CP/SOB/N/V/D. Denies lightheadedness, dizziness, headaches, vision changes. Patient states they are eating well and staying hydrated. Denies any bowel or bladder issues. Per review of SNF records, Patient is eating 3 meals per day, consuming  %. Last documented BM 7/2/25. No concerns from nursing at this time.    The patient's allergies, past medical, surgical, social and family history were reviewed and unchanged.    Review of Systems     Review of Systems   Constitutional:  Negative for activity change, appetite change and fever.   HENT:  Negative for congestion.    Respiratory:  Negative for cough, shortness of breath and wheezing.    Cardiovascular:  Negative for chest pain and palpitations.   Gastrointestinal:  Negative for constipation, diarrhea, nausea and vomiting.    Genitourinary:  Negative for difficulty urinating and dysuria.   Musculoskeletal:  Positive for gait problem.   Skin: Negative.    Neurological:  Negative for weakness.   Psychiatric/Behavioral:  Negative for confusion and sleep disturbance.          Objective     Vitals:   Vitals:    07/02/25 1443   BP: 156/75   Pulse: 76   Resp: 18   Temp: 97.6 °F (36.4 °C)   SpO2: 96%             Physical Exam  Vitals and nursing note reviewed.   Constitutional:       General: He is not in acute distress.     Appearance: Normal appearance. He is not ill-appearing.   HENT:      Head: Normocephalic and atraumatic.     Eyes:      Conjunctiva/sclera: Conjunctivae normal.       Cardiovascular:      Rate and Rhythm: Normal rate and regular rhythm.      Heart sounds: Normal heart sounds.   Pulmonary:      Effort: Pulmonary effort is normal. No respiratory distress.      Breath sounds: Normal breath sounds. No wheezing.   Abdominal:      General: There is no distension.      Tenderness: There is no abdominal tenderness.     Skin:     General: Skin is warm.     Neurological:      Mental Status: He is alert and oriented to person, place, and time.      Motor: Weakness present.      Gait: Gait abnormal.     Psychiatric:         Mood and Affect: Mood normal.         Behavior: Behavior normal.         Pertinent Laboratory/Diagnostic Studies:   Reviewed in facility chart-stable  6/26/25  BASIC METABOLIC PNL         GLUCOSE 125  H 65 - 99 mg/dL       BUN 78  H 7 - 28 mg/dL       CREATININE 1.71  H 0.53 - 1.30 mg/dL       SODIUM 139 135 - 145 mmol/L       POTASSIUM 4.2 3.5 - 5.2 mmol/L       CHLORIDE 101 100 - 109 mmol/L       CARBON DIOXIDE 28 21 - 31 mmol/L       CALCIUM 8.1  L 8.5 - 10.5 mg/dL       ANION GAP 10 3 - 11        eGFRcr 37  L >59       TSH WITH FT4 REFLEX        THYROID STIM HORMONE 4.71 0.45 - 5.33 uIU/mL       6/20/25  CBC NO DIFF         HEMOGLOBIN 8.0  L 12.5 - 17.0 g/dL       HEMATOCRIT 24.3  L 37.0 - 48.0 %       WBC 9.0  "4.0 - 10.5 thou/cmm       RBC 2.39  L 4.00 - 5.40 mill/cmm       PLATELET COUNT 168 140 - 350 thou/cmm       MPV 10.4 7.5 - 11.3 fL         H 80 - 100 fL       MCH 33.5 27.0 - 36.0 pg       MCHC 33.0 32.0 - 37.0 g/dL       RDW 18.4  H 12.0 - 16.0 %         BASIC METABOLIC PNL       GLUCOSE 87 65 - 99 mg/dL       BUN 77  H 7 - 28 mg/dL       CREATININE 1.81  H 0.53 - 1.30 mg/dL       SODIUM 139 135 - 145 mmol/L       POTASSIUM 4.4 3.5 - 5.2 mmol/L       CHLORIDE 103 100 - 109 mmol/L       CARBON DIOXIDE 29 21 - 31 mmol/L       CALCIUM 7.8  L 8.5 - 10.5 mg/dL       ANION GAP 7 3 - 11        eGFRcr 34  L >59         Medications reviewed and updated see facility MAR for details.    Current Medications[3]     Please note:  Voice-recognition software may have been used in the preparation of this document.  Occasional wrong word or \"sound-alike\" substitutions may have occurred due to the inherent limitations of voice recognition software.  Interpretation should be guided by context.         HAILEY Haque  7/2/2025  2:53 PM         [1]   Past Medical History:  Diagnosis Date    Anemia     CHF (congestive heart failure) (HCC)    [2] No past surgical history on file.  [3]   Current Outpatient Medications:     acetaminophen (TYLENOL) 500 mg tablet, Take 1,000 mg by mouth every 8 (eight) hours as needed for mild pain, Disp: , Rfl:     albuterol (PROVENTIL HFA,VENTOLIN HFA) 90 mcg/act inhaler, Inhale 2 puffs every 4 (four) hours as needed for wheezing or shortness of breath, Disp: , Rfl:     allopurinol (ZYLOPRIM) 100 mg tablet, Take 100 mg by mouth daily, Disp: , Rfl:     bumetanide (BUMEX) 1 mg tablet, Take 1 mg by mouth daily, Disp: , Rfl:     ferrous sulfate 325 (65 FE) MG EC tablet, Take 325 mg by mouth every other day, Disp: , Rfl:     gabapentin (NEURONTIN) 100 mg capsule, Take 100 mg by mouth 3 (three) times a day, Disp: , Rfl:     levothyroxine 50 mcg tablet, Take 1 tablet (50 mcg total) by mouth " daily, Disp: , Rfl:     Lidocaine 4 % PTCH, Apply 1 patch topically in the morning. Apply to back daily, remove after 12 hours., Disp: , Rfl:     melatonin 3 mg, Take 3 mg by mouth daily at bedtime, Disp: , Rfl:     metoprolol tartrate (LOPRESSOR) 50 mg tablet, Take 1 tablet (50 mg total) by mouth every 12 (twelve) hours, Disp: , Rfl:     repaglinide (PRANDIN) 0.5 mg tablet, Take 0.5 mg by mouth 3 (three) times a day before meals, Disp: , Rfl:     sitaGLIPtin (JANUVIA) 25 mg tablet, Take 25 mg by mouth daily, Disp: , Rfl:

## 2025-07-02 NOTE — ASSESSMENT & PLAN NOTE
Lab Results   Component Value Date    [id number] 3.180 01/08/2025    TSH 6.27 (H) 05/02/2024    Continue levothyroxine 50 mcg daily

## 2025-07-02 NOTE — ASSESSMENT & PLAN NOTE
Wt Readings from Last 3 Encounters:   06/30/25 60 kg (132 lb 4.8 oz)   06/12/25 60.3 kg (132 lb 15 oz)   Weight stable  Continue metoprolol 50 mg q 12  Continue Bumex 1 mg daily  Monitor for s/s of fluid overload

## 2025-07-03 NOTE — ASSESSMENT & PLAN NOTE
Right buttock  Full thickness, stable  Local wound care with Triad paste  Continue to offload, on air mattress  Increase protein intake, patient currently on Glucerna terra shake  Follow-up next week

## 2025-07-03 NOTE — PROGRESS NOTES
[facility] WOUND CARE MANAGEMENT   AND HYPERBARIC MEDICINE CENTER       Patient ID: Dmitriy Craven is a 89 y.o. male Date of Birth 3/12/1932     Location of Service: [facility]    Performed wound round with: Wound team     Chief Complaint : Right buttock    Wound Instructions:  Wound: Right buttock  Discontinue previous wound order  Cleanse the wound bed with NSS   Apply non-sting skin prep to periwound area  Apply Triad paste to wound bed  Frequency : daily and prn for soiling  Offload all wounds  Turn and reposition frequently  Instruct / Assist with weight shifting in wheelchair  Increase protein intake.  Monitor for any sign of infection or worsening, inform PCP or patient's primary physician in your facility.      Allergies  Patient has no known allergies.      Assessment & Plan:  1. Pressure injury of right buttock, stage 3 ([facility])  Assessment & Plan:  Right buttock  Full thickness, stable  Local wound care with Triad paste  Continue to offload, on air mattress  Increase protein intake, patient currently on Glucerna terra shake  Follow-up next week  2. Ambulatory dysfunction  Assessment & Plan:  on short-term rehab               Subjective:   June 18, 2025.  New consult for wound on the right buttock.  Medical problem includes but not limited to chronic kidney disease, A-fib, type 2 diabetes.  I introduced myself to patient and patient agreed to be seen for wound consult.  Patient was seen with the facility wound team.    Wound history: As per report, patient was admitted with a stage III pressure ulcer on admission    Received patient in bed, seems comfortable.  Denies pain.  Needs assistance with turning and repositioning in bed.  Patient is incontinent of bladder.  Have a good appetite.    June 25, 2025.  Follow-up for wound on the right buttock.  Correction on the previous note, wound is on the right buttock and not left buttock.  No significant issues reported related to the wound.    July  "2, 2025 follow-up for wound on the right buttock.  Patient with no significant issues related to the wound.            Review of Systems   Constitutional: Negative.    Respiratory: Negative.     Cardiovascular: Negative.    Musculoskeletal:  Positive for gait problem.   Skin:  Positive for wound.       Objective:    Physical Exam  Constitutional:       Appearance: Normal appearance.     Cardiovascular:      Rate and Rhythm: Normal rate.   Pulmonary:      Effort: Pulmonary effort is normal.   Genitourinary:     Comments: Incontinent    Skin:     Findings: Lesion present.      Comments: Right buttock: Wound size is 0.8 x 0.7 x 0.1 cm.,100% granulation, no drainage, periwound normal, with no obvious sign of infection     Neurological:      Mental Status: He is alert.              Procedures           Patient's care was coordinated with nursing facility staff. Recent vitals, labs and updated medications were reviewed on EMR or chart system of facility. Past Medical, surgical, social, medication and allergy history and patient's previous records were reviewed and updated as appropriate: Most up-to date information is available in the facility EMR where the patient is currently admitted.    Problem List[1]  Past Medical History[2]  Past Surgical History[3]  Social History[4]   Current Medications[5]  Family History[6]           Coordination of Care: Wound team aware of the treatment plan. Facility nurse will provide wound treatment and monitor the wound for any changes.     Patient / Staff education : Patient / Staff was given education on sign of infection and pressure ulcer prevention. Patient/ Staff verbalized understanding     Follow up :  Next week    Voice-recognition software may have been used in the preparation of this document. Occasional wrong word or \"sound-alike\" substitutions may have occurred due to the inherent limitations of voice recognition software. Interpretation should be guided by " context.      HAILEY Thurman       [1]   Patient Active Problem List  Diagnosis    Skin tear of right elbow without complication    Skin tear of left elbow without complication    Skin tear of right lower leg without complication    Spinal stenosis of lumbar region without neurogenic claudication    CKD (chronic kidney disease) stage 4, GFR 15-29 ml/min (HCC)    Atrial fibrillation (HCC)    Pulmonary hypertension (HCC)    Pleural effusion, left    Rib pain on left side    Ambulatory dysfunction    Hypothyroidism    Supratherapeutic INR    Anemia    Pneumonia    Chronic diastolic heart failure (HCC)    RSV (acute bronchiolitis due to respiratory syncytial virus)    DM2 (diabetes mellitus, type 2) (HCC)    Pressure injury of right buttock, stage 3 (HCC)   [2]   Past Medical History:  Diagnosis Date    Anemia     CHF (congestive heart failure) ([facility])    [3] No past surgical history on file.  [4]   Social History  Socioeconomic History    Marital status:    Tobacco Use    Smoking status: Former     Types: Cigarettes    Smokeless tobacco: Never   Substance and Sexual Activity    Alcohol use: Not Currently    Drug use: Never     Social Drivers of Health     Financial Resource Strain: Not At Risk (5/19/2025)    Received from [facility]    Financial Insecurity     In the last 12 months did you skip medications to save money?: No     In the last 12 months was there a time when you needed to see a doctor but could not because of cost?: No   Food Insecurity: No Food Insecurity (6/9/2025)    Nursing - Inadequate Food Risk Classification     Ran Out of Food in the Last Year: Never true   Transportation Needs: No Transportation Needs (6/9/2025)    Nursing - Transportation Risk Classification     Lack of Transportation: No   Social Connections: Socially Integrated (5/19/2025)    Received from [facility]    Social Connection     Do you often feel lonely?: No   Intimate Partner  Violence: Unknown (6/9/2025)    Nursing IPS     Physically Hurt by Someone: No     Hurt or Threatened by Someone: No   Housing Stability: Unknown (6/9/2025)    Nursing: Inadequate Housing Risk Classification     Unable to Pay for Housing in the Last Year: No     Has Housing: No   [5]   Current Outpatient Medications:     acetaminophen (TYLENOL) 500 mg tablet, Take 1,000 mg by mouth every 8 (eight) hours as needed for mild pain, Disp: , Rfl:     albuterol (PROVENTIL HFA,VENTOLIN HFA) 90 mcg/act inhaler, Inhale 2 puffs every 4 (four) hours as needed for wheezing or shortness of breath, Disp: , Rfl:     allopurinol (ZYLOPRIM) 100 mg tablet, Take 100 mg by mouth daily, Disp: , Rfl:     bumetanide (BUMEX) 1 mg tablet, Take 1 mg by mouth daily, Disp: , Rfl:     ferrous sulfate 325 (65 FE) MG EC tablet, Take 325 mg by mouth every other day, Disp: , Rfl:     gabapentin (NEURONTIN) 100 mg capsule, Take 100 mg by mouth 3 (three) times a day, Disp: , Rfl:     levothyroxine 50 mcg tablet, Take 1 tablet (50 mcg total) by mouth daily, Disp: , Rfl:     Lidocaine 4 % PTCH, Apply 1 patch topically in the morning. Apply to back daily, remove after 12 hours., Disp: , Rfl:     melatonin 3 mg, Take 3 mg by mouth daily at bedtime, Disp: , Rfl:     metoprolol tartrate (LOPRESSOR) 50 mg tablet, Take 1 tablet (50 mg total) by mouth every 12 (twelve) hours, Disp: , Rfl:     repaglinide (PRANDIN) 0.5 mg tablet, Take 0.5 mg by mouth 3 (three) times a day before meals, Disp: , Rfl:     sitaGLIPtin (JANUVIA) 25 mg tablet, Take 25 mg by mouth daily, Disp: , Rfl:   [6] No family history on file.

## 2025-07-08 ENCOUNTER — NURSING HOME VISIT (OUTPATIENT)
Dept: GERIATRICS | Facility: OTHER | Age: OVER 89
End: 2025-07-08
Payer: COMMERCIAL

## 2025-07-08 VITALS
HEART RATE: 60 BPM | DIASTOLIC BLOOD PRESSURE: 50 MMHG | OXYGEN SATURATION: 95 % | BODY MASS INDEX: 21.14 KG/M2 | WEIGHT: 135 LBS | SYSTOLIC BLOOD PRESSURE: 100 MMHG | TEMPERATURE: 97.1 F | RESPIRATION RATE: 18 BRPM

## 2025-07-08 DIAGNOSIS — I50.32 CHRONIC DIASTOLIC HEART FAILURE (HCC): ICD-10-CM

## 2025-07-08 DIAGNOSIS — I48.91 ATRIAL FIBRILLATION, UNSPECIFIED TYPE (HCC): Primary | ICD-10-CM

## 2025-07-08 DIAGNOSIS — E03.9 HYPOTHYROIDISM, UNSPECIFIED TYPE: ICD-10-CM

## 2025-07-08 DIAGNOSIS — M48.061 SPINAL STENOSIS OF LUMBAR REGION WITHOUT NEUROGENIC CLAUDICATION: ICD-10-CM

## 2025-07-08 DIAGNOSIS — E11.69 TYPE 2 DIABETES MELLITUS WITH OTHER SPECIFIED COMPLICATION, WITHOUT LONG-TERM CURRENT USE OF INSULIN (HCC): ICD-10-CM

## 2025-07-08 DIAGNOSIS — N18.4 CKD (CHRONIC KIDNEY DISEASE) STAGE 4, GFR 15-29 ML/MIN (HCC): ICD-10-CM

## 2025-07-08 PROCEDURE — 99309 SBSQ NF CARE MODERATE MDM 30: CPT | Performed by: NURSE PRACTITIONER

## 2025-07-08 NOTE — PROGRESS NOTES
[facility]  [address] [address]  [phone]  FACILITY: [facility]  Code 31 (STR)      NAME: Dmitriy Craven  AGE: 89 y.o. SEX: male CODE STATUS: CPR    DATE OF ENCOUNTER: 7/8/2025    Assessment and Plan     1. Atrial fibrillation, unspecified type (HCC)  Assessment & Plan:  HR controlled on exam   Rate controlled with Metoprolol 50 mg BID   Previously on coumadin but discontinued due to advanced age and high bleeding risk with constant supra therapeutic INRs despite being on lowest dose  2. Chronic diastolic heart failure (HCC)  Assessment & Plan:  Wt Readings from Last 3 Encounters:   07/08/25 61.2 kg (135 lb)   06/30/25 60 kg (132 lb 4.8 oz)   06/12/25 60.3 kg (132 lb 15 oz)     Noted with 3 lb weight gain in 2 weeks  Appears euvolemic on exam   Continue Bumex 1 mg daily   Continue Beta blocker   Monitor weights           3. Hypothyroidism, unspecified type  Assessment & Plan:  Last TSH 4.71 as of 6/26/25 at rehab   Stable   Continue Levothyroxine 50 mcg daily   4. Type 2 diabetes mellitus with other specified complication, without long-term current use of insulin ([facility])  Assessment & Plan:    Lab Results   Component Value Date    HGBA1C 6.2 (H) 05/19/2025     Per geriatric guidelines, goal HgA1c is > 7.5 to prevent hypoglycemic event in the older adult with cognitive decline    Stable   Continue Prandin 0.5 mg TID with meals  Continue Sitagliptin 25 mg daily   Monitor Accu Checks   Hypoglyceimic Protocol   5. CKD (chronic kidney disease) stage 4, GFR 15-29 ml/min ([facility])  Assessment & Plan:  Lab Results   Component Value Date    EGFR 28 06/12/2025    EGFR 24 06/11/2025    EGFR 23 06/10/2025    CREATININE 1.97 (H) 06/12/2025    CREATININE 2.25 (H) 06/11/2025    CREATININE 2.34 (H) 06/10/2025     Baseline creatinine around 2.2  Last creatinine at rehab 1.71- stable   Avoid Hypotension  Avoid NSAIDs  Renally dose medications as appropriate   6. Spinal stenosis of lumbar region  without neurogenic claudication  Assessment & Plan:  Hx of Chronic back pain and recently hospitalized with worsen back pain   Patient continues to work with PT/OT  Continues on pain regimen of gabapentin, oxycodone, methocarbamol which he states is effective  Continue Bowel regimen to prevent constipation   Continue PT/OT , Fall Precautions    following for d/c planning        All medications and routine orders were reviewed and updated as needed.    Chief Complaint     STR follow up visit    Past Medical and Surgica History      Past Medical History[1]  Past Surgical History[2]  No Known Allergies       History of Present Illness     Dmitriy Craven is a 89 y.o. male admitted to [facility] following hospital stay for Worsening back pain. Patient has a past medical Hx including but not limited to hypertension, atrial fibrillation, CKD, spinal stenosis with ambulatory dysfunction. Patient is seen in collaboration with nursing for medical mgmt and STR follow up.     Patient initially presented to hospital on 5/19/2025 from [facility] with complaints of worsening back pain.  Imaging revealed pneumonia which improved with antibiotics.  He was discharged on oral cefpodoxime.  Patient was recommended discharge to postacute rehab.  Patient was then readmitted to the hospital with an elevated INR of 11 and a hemoglobin of 6.5 on 6/9/2025.  He received vitamin K and 1 unit of packed red blood cells.  His stool was heme positive.  Patient declined to have any GI procedures.  Additionally patient was found to be positive for RSV and was treated with antibiotics and steroids with improvement and was discharged back to rehab.  Due to patient's advanced age and constant supratherapeutic INR even on lowest dose of Coumadin decision was made to stop anticoagulation as the risks outweigh the benefits.    Seen and examined at bedside today. Patient is a reliable historian.  Patient is awake  alert does not appear in distress states he is doing well denies any pain states he is sleeping well and eating well.  Denies any fever or chills.  Patient denies any bowel or bladder issues.  Patient states he has been working with therapy.  No concerns from nursing or staff at this time.          The patient's allergies, past medical, surgical, social and family history were reviewed and unchanged.    Review of Systems     Review of Systems   All other systems reviewed and are negative.        Objective     Vitals:   Vitals:    07/08/25 1007   BP: 100/50   Pulse: 60   Resp: 18   Temp: (!) 97.1 °F (36.2 °C)   SpO2: 95%         Physical Exam  Vitals and nursing note reviewed.   Constitutional:       General: He is not in acute distress.     Appearance: Normal appearance.   HENT:      Head: Normocephalic and atraumatic.      Nose: No congestion or rhinorrhea.      Mouth/Throat:      Mouth: Mucous membranes are moist.     Eyes:      General: No scleral icterus.     Extraocular Movements: Extraocular movements intact.      Conjunctiva/sclera: Conjunctivae normal.      Pupils: Pupils are equal, round, and reactive to light.       Cardiovascular:      Rate and Rhythm: Normal rate and regular rhythm.      Pulses: Normal pulses.      Heart sounds: Normal heart sounds. No murmur heard.  Pulmonary:      Effort: Pulmonary effort is normal.      Breath sounds: Normal breath sounds. No wheezing, rhonchi or rales.   Abdominal:      General: Bowel sounds are normal. There is no distension.      Palpations: Abdomen is soft.      Tenderness: There is no abdominal tenderness.     Musculoskeletal:         General: No swelling or signs of injury.   Lymphadenopathy:      Cervical: No cervical adenopathy.     Skin:     General: Skin is warm and dry.      Findings: No erythema.     Neurological:      Mental Status: He is alert and oriented to person, place, and time.      Sensory: No sensory deficit.      Motor: Weakness present.       "Gait: Gait abnormal.     Psychiatric:         Mood and Affect: Mood normal.         Behavior: Behavior normal.         Pertinent Laboratory/Diagnostic Studies:     Reviewed in facility chart      Current Medications   Medications reviewed and updated see facility MAR for details.    Current Medications[3]     Please note:  Voice-recognition software may have been used in the preparation of this document.  Occasional wrong word or \"sound-alike\" substitutions may have occurred due to the inherent limitations of voice recognition software.  Interpretation should be guided by context.         HAILYE Patel  7/8/2025  10:37 AM         [1]   Past Medical History:  Diagnosis Date    Anemia     CHF (congestive heart failure) (HCC)    [2] No past surgical history on file.  [3]   Current Outpatient Medications:     acetaminophen (TYLENOL) 500 mg tablet, Take 1,000 mg by mouth every 8 (eight) hours as needed for mild pain, Disp: , Rfl:     albuterol (PROVENTIL HFA,VENTOLIN HFA) 90 mcg/act inhaler, Inhale 2 puffs every 4 (four) hours as needed for wheezing or shortness of breath, Disp: , Rfl:     allopurinol (ZYLOPRIM) 100 mg tablet, Take 100 mg by mouth daily, Disp: , Rfl:     bumetanide (BUMEX) 1 mg tablet, Take 1 mg by mouth daily, Disp: , Rfl:     ferrous sulfate 325 (65 FE) MG EC tablet, Take 325 mg by mouth every other day, Disp: , Rfl:     gabapentin (NEURONTIN) 100 mg capsule, Take 100 mg by mouth 3 (three) times a day, Disp: , Rfl:     levothyroxine 50 mcg tablet, Take 1 tablet (50 mcg total) by mouth daily, Disp: , Rfl:     Lidocaine 4 % PTCH, Apply 1 patch topically in the morning. Apply to back daily, remove after 12 hours., Disp: , Rfl:     melatonin 3 mg, Take 3 mg by mouth daily at bedtime, Disp: , Rfl:     metoprolol tartrate (LOPRESSOR) 50 mg tablet, Take 1 tablet (50 mg total) by mouth every 12 (twelve) hours, Disp: , Rfl:     repaglinide (PRANDIN) 0.5 mg tablet, Take 0.5 mg by mouth 3 (three) times a " day before meals, Disp: , Rfl:     sitaGLIPtin (JANUVIA) 25 mg tablet, Take 25 mg by mouth daily, Disp: , Rfl:

## 2025-07-08 NOTE — ASSESSMENT & PLAN NOTE
Lab Results   Component Value Date    HGBA1C 6.2 (H) 05/19/2025     Per geriatric guidelines, goal HgA1c is > 7.5 to prevent hypoglycemic event in the older adult with cognitive decline    Stable   Continue Prandin 0.5 mg TID with meals  Continue Sitagliptin 25 mg daily   Monitor Accu Checks   Hypoglyceimic Protocol

## 2025-07-08 NOTE — ASSESSMENT & PLAN NOTE
Hx of Chronic back pain and recently hospitalized with worsen back pain   Patient continues to work with PT/OT  Continues on pain regimen of gabapentin, oxycodone, methocarbamol which he states is effective  Continue Bowel regimen to prevent constipation   Continue PT/OT , Fall Precautions    following for d/c planning

## 2025-07-08 NOTE — ASSESSMENT & PLAN NOTE
HR controlled on exam   Rate controlled with Metoprolol 50 mg BID   Previously on coumadin but discontinued due to advanced age and high bleeding risk with constant supra therapeutic INRs despite being on lowest dose

## 2025-07-08 NOTE — ASSESSMENT & PLAN NOTE
Lab Results   Component Value Date    EGFR 28 06/12/2025    EGFR 24 06/11/2025    EGFR 23 06/10/2025    CREATININE 1.97 (H) 06/12/2025    CREATININE 2.25 (H) 06/11/2025    CREATININE 2.34 (H) 06/10/2025     Baseline creatinine around 2.2  Last creatinine at rehab 1.71- stable   Avoid Hypotension  Avoid NSAIDs  Renally dose medications as appropriate

## 2025-07-08 NOTE — ASSESSMENT & PLAN NOTE
Wt Readings from Last 3 Encounters:   07/08/25 61.2 kg (135 lb)   06/30/25 60 kg (132 lb 4.8 oz)   06/12/25 60.3 kg (132 lb 15 oz)     Noted with 3 lb weight gain in 2 weeks  Appears euvolemic on exam   Continue Bumex 1 mg daily   Continue Beta blocker   Monitor weights

## 2025-07-09 ENCOUNTER — NURSING HOME VISIT (OUTPATIENT)
Dept: WOUND CARE | Facility: HOSPITAL | Age: OVER 89
End: 2025-07-09
Payer: COMMERCIAL

## 2025-07-09 DIAGNOSIS — R26.2 AMBULATORY DYSFUNCTION: ICD-10-CM

## 2025-07-09 DIAGNOSIS — L89.313 PRESSURE INJURY OF RIGHT BUTTOCK, STAGE 3 (HCC): Primary | ICD-10-CM

## 2025-07-09 PROBLEM — J21.0 RSV (ACUTE BRONCHIOLITIS DUE TO RESPIRATORY SYNCYTIAL VIRUS): Status: RESOLVED | Noted: 2025-06-09 | Resolved: 2025-07-09

## 2025-07-09 PROBLEM — J18.9 PNEUMONIA: Status: RESOLVED | Noted: 2025-06-09 | Resolved: 2025-07-09

## 2025-07-09 PROCEDURE — 99309 SBSQ NF CARE MODERATE MDM 30: CPT | Performed by: NURSE PRACTITIONER

## 2025-07-09 NOTE — PROGRESS NOTES
[facility] WOUND C[facility] MANAGEMENT   AND HYPERBARIC MEDICINE CENTER       Patient ID: Dmitriy Craven is a 89 y.o. male Date of Birth 3/12/1932     Location of Service: [facility]    Performed wound round with: Wound team     Chief Complaint : Right buttock    Wound Instructions:  Wound: Right buttock  Discontinue previous wound order  Cleanse the wound bed with NSS   Apply non-sting skin prep to periwound area  Apply Triad paste to wound bed  Frequency : daily and prn for soiling  Offload all wounds  Turn and reposition frequently  Instruct / Assist with weight shifting in wheelchair  Increase protein intake.  Monitor for any sign of infection or worsening, inform PCP or patient's primary physician in your facility.      Allergies  Patient has no known allergies.      Assessment & Plan:  1. Pressure injury of right buttock, stage 3 ([facility])  Assessment & Plan:  Right buttock  Full thickness, stable, wound size almost the same as last week  Local wound care with Triad paste for 1 more week, if no improvement, will change to a different treatment  Continue to offload, on air mattress  Increase protein intake, patient currently on Glucerna terra shake  Follow-up next week  2. Ambulatory dysfunction  Assessment & Plan:  on short-term rehab                 Subjective:   June 18, 2025.  New consult for wound on the right buttock.  Medical problem includes but not limited to chronic kidney disease, A-fib, type 2 diabetes.  I introduced myself to patient and patient agreed to be seen for wound consult.  Patient was seen with the facility wound team.    Wound history: As per report, patient was admitted with a stage III pressure ulcer on admission    Received patient in bed, seems comfortable.  Denies pain.  Needs assistance with turning and repositioning in bed.  Patient is incontinent of bladder.  Have a good appetite.    June 25, 2025.  Follow-up for wound on the right buttock.  Correction on the previous  note, wound is on the right buttock and not left buttock.  No significant issues reported related to the wound.    July 2, 2025 follow-up for wound on the right buttock.  Patient with no significant issues related to the wound.    July 9, 2025.  Follow-up for wound on the right buttock.  Received patient in bed, seems comfortable.  No significant issues reported related to the wound.            Review of Systems   Constitutional: Negative.    Respiratory: Negative.     Cardiovascular: Negative.    Musculoskeletal:  Positive for gait problem.   Skin:  Positive for wound.       Objective:    Physical Exam  Constitutional:       Appearance: Normal appearance.     Cardiovascular:      Rate and Rhythm: Normal rate.   Pulmonary:      Effort: Pulmonary effort is normal.   Genitourinary:     Comments: Incontinent    Skin:     Findings: Lesion present.      Comments: Right buttock: Wound size is 0.8 x 0.9 x 0.1 cm, 00% granulation, small amount of serous drainage, periwound normal, with no obvious sign of infection     Neurological:      Mental Status: He is alert.              Procedures           Patient's care was coordinated with nursing facility staff. Recent vitals, labs and updated medications were reviewed on EMR or chart system of facility. Past Medical, surgical, social, medication and allergy history and patient's previous records were reviewed and updated as appropriate: Most up-to date information is available in the facility EMR where the patient is currently admitted.    Problem List[1]  Past Medical History[2]  Past Surgical History[3]  Social History[4]   Current Medications[5]  Family History[6]           Coordination of Care: Wound team aware of the treatment plan. Facility nurse will provide wound treatment and monitor the wound for any changes.     Patient / Staff education : Patient / Staff was given education on sign of infection and pressure ulcer prevention. Patient/ Staff verbalized understanding  "    Follow up :  Next week    Voice-recognition software may have been used in the preparation of this document. Occasional wrong word or \"sound-alike\" substitutions may have occurred due to the inherent limitations of voice recognition software. Interpretation should be guided by context.      HAILEY Thurman         [1]   Patient Active Problem List  Diagnosis    Skin tear of right elbow without complication    Skin tear of left elbow without complication    Skin tear of right lower leg without complication    Spinal stenosis of lumbar region without neurogenic claudication    CKD (chronic kidney disease) stage 4, GFR 15-29 ml/min (HCC)    Atrial fibrillation (HCC)    Pulmonary hypertension (HCC)    Pleural effusion, left    Rib pain on left side    Ambulatory dysfunction    Hypothyroidism    Supratherapeutic INR    Anemia    Chronic diastolic heart failure (HCC)    DM2 (diabetes mellitus, type 2) (HCC)    Pressure injury of right buttock, stage 3 (HCC)   [2]   Past Medical History:  Diagnosis Date    Anemia     CHF (congestive heart failure) (HCC)    [3] No past surgical history on file.  [4]   Social History  Socioeconomic History    Marital status:    Tobacco Use    Smoking status: Former     Types: Cigarettes    Smokeless tobacco: Never   Substance and Sexual Activity    Alcohol use: Not Currently    Drug use: Never     Social Drivers of Health     Financial Resource Strain: Not At Risk (5/19/2025)    Received from [facility]    Financial Insecurity     In the last 12 months did you skip medications to save money?: No     In the last 12 months was there a time when you needed to see a doctor but could not because of cost?: No   Food Insecurity: No Food Insecurity (6/9/2025)    Nursing - Inadequate Food Risk Classification     Ran Out of Food in the Last Year: Never true   Transportation Needs: No Transportation Needs (6/9/2025)    Nursing - Transportation Risk Classification     " Lack of Transportation: No   Social Connections: Socially Integrated (5/19/2025)    Received from [facility]    Social Connection     Do you often feel lonely?: No   Intimate Partner Violence: Unknown (6/9/2025)    Nursing IPS     Physically Hurt by Someone: No     Hurt or Threatened by Someone: No   Housing Stability: Unknown (6/9/2025)    Nursing: Inadequate Housing Risk Classification     Unable to Pay for Housing in the Last Year: No     Has Housing: No   [5]   Current Outpatient Medications:     acetaminophen (TYLENOL) 500 mg tablet, Take 1,000 mg by mouth every 8 (eight) hours as needed for mild pain, Disp: , Rfl:     albuterol (PROVENTIL HFA,VENTOLIN HFA) 90 mcg/act inhaler, Inhale 2 puffs every 4 (four) hours as needed for wheezing or shortness of breath, Disp: , Rfl:     allopurinol (ZYLOPRIM) 100 mg tablet, Take 100 mg by mouth daily, Disp: , Rfl:     bumetanide (BUMEX) 1 mg tablet, Take 1 mg by mouth daily, Disp: , Rfl:     ferrous sulfate 325 (65 FE) MG EC tablet, Take 325 mg by mouth every other day, Disp: , Rfl:     gabapentin (NEURONTIN) 100 mg capsule, Take 100 mg by mouth 3 (three) times a day, Disp: , Rfl:     levothyroxine 50 mcg tablet, Take 1 tablet (50 mcg total) by mouth daily, Disp: , Rfl:     Lidocaine 4 % PTCH, Apply 1 patch topically in the morning. Apply to back daily, remove after 12 hours., Disp: , Rfl:     melatonin 3 mg, Take 3 mg by mouth daily at bedtime, Disp: , Rfl:     metoprolol tartrate (LOPRESSOR) 50 mg tablet, Take 1 tablet (50 mg total) by mouth every 12 (twelve) hours, Disp: , Rfl:     repaglinide (PRANDIN) 0.5 mg tablet, Take 0.5 mg by mouth 3 (three) times a day before meals, Disp: , Rfl:     sitaGLIPtin (JANUVIA) 25 mg tablet, Take 25 mg by mouth daily, Disp: , Rfl:   [6] No family history on file.

## 2025-07-10 ENCOUNTER — NURSING HOME VISIT (OUTPATIENT)
Dept: GERIATRICS | Facility: OTHER | Age: OVER 89
End: 2025-07-10
Payer: COMMERCIAL

## 2025-07-10 VITALS
RESPIRATION RATE: 18 BRPM | OXYGEN SATURATION: 95 % | SYSTOLIC BLOOD PRESSURE: 113 MMHG | WEIGHT: 135 LBS | TEMPERATURE: 97.1 F | BODY MASS INDEX: 21.14 KG/M2 | HEART RATE: 71 BPM | DIASTOLIC BLOOD PRESSURE: 56 MMHG

## 2025-07-10 DIAGNOSIS — I50.32 CHRONIC DIASTOLIC HEART FAILURE (HCC): Primary | ICD-10-CM

## 2025-07-10 DIAGNOSIS — N18.4 CKD (CHRONIC KIDNEY DISEASE) STAGE 4, GFR 15-29 ML/MIN (HCC): ICD-10-CM

## 2025-07-10 DIAGNOSIS — M48.061 SPINAL STENOSIS OF LUMBAR REGION WITHOUT NEUROGENIC CLAUDICATION: ICD-10-CM

## 2025-07-10 DIAGNOSIS — I48.91 ATRIAL FIBRILLATION, UNSPECIFIED TYPE (HCC): ICD-10-CM

## 2025-07-10 DIAGNOSIS — R26.2 AMBULATORY DYSFUNCTION: ICD-10-CM

## 2025-07-10 DIAGNOSIS — E03.9 HYPOTHYROIDISM, UNSPECIFIED TYPE: ICD-10-CM

## 2025-07-10 DIAGNOSIS — E11.69 TYPE 2 DIABETES MELLITUS WITH OTHER SPECIFIED COMPLICATION, WITHOUT LONG-TERM CURRENT USE OF INSULIN (HCC): ICD-10-CM

## 2025-07-10 PROCEDURE — 99316 NF DSCHRG MGMT 30 MIN+: CPT | Performed by: NURSE PRACTITIONER

## 2025-07-10 NOTE — ASSESSMENT & PLAN NOTE
Wt Readings from Last 3 Encounters:   07/10/25 61.2 kg (135 lb)   07/08/25 61.2 kg (135 lb)   06/30/25 60 kg (132 lb 4.8 oz)     Noted with 3 lb weight gain in 2 weeks  Appears euvolemic on exam   Continue Bumex 1 mg daily   Continue Beta blocker   Monitor weights

## 2025-07-10 NOTE — ASSESSMENT & PLAN NOTE
Right buttock  Full thickness, stable, wound size almost the same as last week  Local wound care with Triad paste for 1 more week, if no improvement, will change to a different treatment  Continue to offload, on air mattress  Increase protein intake, patient currently on Glucerna terra shake  Follow-up next week

## 2025-07-10 NOTE — PROGRESS NOTES
[place] [id number] [facility] [id number]  [phone]  DISCHARGE SUMMARY  FACILITY: [facility]  Code 31    NAME: Dmitriy Craven  AGE: 89 y.o. SEX: male   CODE STATUS: CPR    DATE OF ADMISSION: 6/12/2025   DATE OF DISCHARGE: 7/12/2025   DISCHARGE DISPOSITION: Stable for discharge to home with family support and home health PT/OT/SN services.       Reason for Admission: Patient was admitted to [facility] for rehabilitation after hospitalization for ambulatory dysfunction/generalized weakness in setting of worsening back pain.    Past Medical and Surgical History:   Past Medical History[1]   Past Surgical History[2]    Course of stay:   Dmitriy Craven is a 89 y.o. male admitted to [facility] following hospital stay for Worsening back pain. Patient has a past medical Hx including but not limited to hypertension, atrial fibrillation, CKD, spinal stenosis with ambulatory dysfunction. Patient is seen in collaboration with nursing for medical mgmt and STR follow up.     Patient initially presented to hospital on 5/19/2025 from [facility] with complaints of worsening back pain.  Imaging revealed pneumonia which improved with antibiotics.  He was discharged on oral cefpodoxime.  Patient was recommended discharge to postacute rehab.  Patient was then readmitted to the hospital with an elevated INR of 11 and a hemoglobin of 6.5 on 6/9/2025.  He received vitamin K and 1 unit of packed red blood cells.  His stool was heme positive.  Patient declined to have any GI procedures.  Additionally patient was found to be positive for RSV and was treated with antibiotics and steroids with improvement and was discharged back to rehab.  Due to patient's advanced age and constant supratherapeutic INR even on lowest dose of Coumadin decision was made to stop anticoagulation as the risks outweigh the benefits.    Seen and examined at bedside today. Patient is a reliable  historian.  Patient is awake alert does not appear in distress states he is doing well denies any pain states he is sleeping well and eating well.  Denies any fever or chills.  Patient denies any bowel or bladder issues.  Patient states he has been working with therapy.  No concerns from nursing or staff at this time.      ROS:  Review of Systems   All other systems reviewed and are negative.      PHYSICAL EXAM:  VITALS:   Vitals:    07/10/25 1158   BP: 113/56   Pulse: 71   Resp: 18   Temp: (!) 97.1 °F (36.2 °C)   SpO2: 95%        Physical Exam  Vitals and nursing note reviewed.   Constitutional:       General: He is not in acute distress.     Appearance: Normal appearance.   HENT:      Head: Normocephalic and atraumatic.      Nose: No congestion or rhinorrhea.      Mouth/Throat:      Mouth: Mucous membranes are moist.     Eyes:      General: No scleral icterus.     Extraocular Movements: Extraocular movements intact.      Conjunctiva/sclera: Conjunctivae normal.      Pupils: Pupils are equal, round, and reactive to light.       Cardiovascular:      Rate and Rhythm: Normal rate and regular rhythm.      Pulses: Normal pulses.      Heart sounds: Normal heart sounds. No murmur heard.  Pulmonary:      Effort: Pulmonary effort is normal.      Breath sounds: Normal breath sounds. No wheezing, rhonchi or rales.   Abdominal:      General: Bowel sounds are normal. There is no distension.      Palpations: Abdomen is soft.      Tenderness: There is no abdominal tenderness.     Musculoskeletal:         General: No swelling or signs of injury.   Lymphadenopathy:      Cervical: No cervical adenopathy.     Skin:     General: Skin is warm and dry.      Findings: No erythema.     Neurological:      Mental Status: He is alert and oriented to person, place, and time.      Sensory: No sensory deficit.      Motor: Weakness present.      Gait: Gait abnormal.     Psychiatric:         Mood and Affect: Mood normal.         Behavior:  Behavior normal.       Admission Diagnoses:   1. Chronic diastolic heart failure ([facility])  Assessment & Plan:  Wt Readings from Last 3 Encounters:   07/10/25 61.2 kg (135 lb)   07/08/25 61.2 kg (135 lb)   06/30/25 60 kg (132 lb 4.8 oz)     Noted with 3 lb weight gain in 2 weeks  Appears euvolemic on exam   Continue Bumex 1 mg daily   Continue Beta blocker   Monitor weights           2. Atrial fibrillation, unspecified type ([facility])  Assessment & Plan:  HR controlled on exam   Rate controlled with Metoprolol 50 mg BID   Previously on coumadin but discontinued due to advanced age and high bleeding risk with constant supra therapeutic INRs despite being on lowest dose  3. Hypothyroidism, unspecified type  Assessment & Plan:  Last TSH 4.71 as of 6/26/25 at rehab   Stable   Continue Levothyroxine 50 mcg daily   4. Type 2 diabetes mellitus with other specified complication, without long-term current use of insulin ([facility])  Assessment & Plan:    Lab Results   Component Value Date    HGBA1C 6.2 (H) 05/19/2025     Per geriatric guidelines, goal HgA1c is > 7.5 to prevent hypoglycemic event in the older adult with cognitive decline    Stable   Continue Prandin 0.5 mg TID with meals  Continue Sitagliptin 25 mg daily   Monitor Accu Checks   Hypoglyceimic Protocol   5. CKD (chronic kidney disease) stage 4, GFR 15-29 ml/min ([facility])  Assessment & Plan:    Lab Results   Component Value Date    EGFR 28 06/12/2025    EGFR 24 06/11/2025    EGFR 23 06/10/2025    CREATININE 1.97 (H) 06/12/2025    CREATININE 2.25 (H) 06/11/2025    CREATININE 2.34 (H) 06/10/2025     Baseline creatinine around 2.2  Last creatinine at rehab 1.71- stable   Avoid Hypotension  Avoid NSAIDs  Renally dose medications as appropriate   6. Ambulatory dysfunction  Assessment & Plan:  Multifactorial  Continue PT/OT  Fall Precautions  Ensure adequate nutrition/hydration   Monitor CBC/BMP    following for d/c planning     7. Spinal stenosis of lumbar region without  "neurogenic claudication  Assessment & Plan:  Hx of Chronic back pain and recently hospitalized with worsen back pain   Patient continues to work with PT/OT  Continues on pain regimen of gabapentin, oxycodone, methocarbamol which he states is effective  Continue Bowel regimen to prevent constipation   Continue PT/OT , Fall Precautions    following for d/c planning        Follow-up Recommendations:    Outpatient Follow up with PCP in the next 2 weeks  Home Health PT/OT/SN services     Labs and testing performed during stay:    Reviewed in chart    Discharge Medications: See discharge medication list which was reviewed and signed.    Current Medications[3]     Discussion with patient/family and further instructions:  -Fall precautions  -Aspiration precautions  -Bleeding precautions  -Monitor for signs/symptoms of infection  -Medication list was reviewed and signed  -DME form was completed    Status at time of discharge: Stable      Billing based on time. Time spent on unit, 40 minutes. Time spent counseling pt on debility/condition, 30 minutes.      Please note:  Voice-recognition software may have been used in the preparation of this document.  Occasional wrong word or \"sound-alike\" substitutions may have occurred due to the inherent limitations of voice recognition software.  Interpretation should be guided by context.        HAILEY Patel  7/10/2025        [1]   Past Medical History:  Diagnosis Date    Anemia     CHF (congestive heart failure) ([facility])    [2] No past surgical history on file.  [3]   Current Outpatient Medications:     acetaminophen (TYLENOL) 500 mg tablet, Take 1,000 mg by mouth every 8 (eight) hours as needed for mild pain, Disp: , Rfl:     albuterol (PROVENTIL HFA,VENTOLIN HFA) 90 mcg/act inhaler, Inhale 2 puffs every 4 (four) hours as needed for wheezing or shortness of breath, Disp: , Rfl:     allopurinol (ZYLOPRIM) 100 mg tablet, Take 100 mg by mouth daily, Disp: , Rfl:     " bumetanide (BUMEX) 1 mg tablet, Take 1 mg by mouth daily, Disp: , Rfl:     ferrous sulfate 325 (65 FE) MG EC tablet, Take 325 mg by mouth every other day, Disp: , Rfl:     gabapentin (NEURONTIN) 100 mg capsule, Take 100 mg by mouth 3 (three) times a day, Disp: , Rfl:     levothyroxine 50 mcg tablet, Take 1 tablet (50 mcg total) by mouth daily, Disp: , Rfl:     Lidocaine 4 % PTCH, Apply 1 patch topically in the morning. Apply to back daily, remove after 12 hours., Disp: , Rfl:     melatonin 3 mg, Take 3 mg by mouth daily at bedtime, Disp: , Rfl:     metoprolol tartrate (LOPRESSOR) 50 mg tablet, Take 1 tablet (50 mg total) by mouth every 12 (twelve) hours, Disp: , Rfl:     repaglinide (PRANDIN) 0.5 mg tablet, Take 0.5 mg by mouth 3 (three) times a day before meals, Disp: , Rfl:     sitaGLIPtin (JANUVIA) 25 mg tablet, Take 25 mg by mouth daily, Disp: , Rfl:

## 2025-08-21 ENCOUNTER — HOME CARE VISIT (OUTPATIENT)
Dept: HOME HEALTH SERVICES | Facility: HOME HEALTHCARE | Age: OVER 89
End: 2025-08-21